# Patient Record
Sex: FEMALE | Race: WHITE | NOT HISPANIC OR LATINO | Employment: UNEMPLOYED | URBAN - METROPOLITAN AREA
[De-identification: names, ages, dates, MRNs, and addresses within clinical notes are randomized per-mention and may not be internally consistent; named-entity substitution may affect disease eponyms.]

---

## 2017-01-24 ENCOUNTER — TELEPHONE (OUTPATIENT)
Dept: FAMILY MEDICINE | Facility: CLINIC | Age: 57
End: 2017-01-24

## 2017-01-24 DIAGNOSIS — H10.45 CHRONIC ALLERGIC CONJUNCTIVITIS: Primary | ICD-10-CM

## 2017-01-24 RX ORDER — OLOPATADINE HYDROCHLORIDE 1 MG/ML
1 SOLUTION/ DROPS OPHTHALMIC 2 TIMES DAILY PRN
Qty: 5 ML | Refills: 3 | Status: SHIPPED | OUTPATIENT
Start: 2017-01-24 | End: 2018-02-22

## 2017-01-24 NOTE — TELEPHONE ENCOUNTER
Panel Management Review      Patient has the following on her problem list: None      Composite cancer screening  Chart review shows that this patient is due/due soon for the following Pap Smear, Colonoscopy and Mammogram   Summary:    Patient is due/failing the following:   COLONOSCOPY, MAMMOGRAM, PAP and PHYSICAL    Action needed:   Patient needs office visit for a pap and physical. She also needs orders for a colonoscopy and mammogram.     Type of outreach:    Phone, left message for patient to call back.  and Sent letter.    Questions for provider review:    None                                                                                                                                    Willian Ellison CMA       Chart routed to self.

## 2017-01-30 DIAGNOSIS — G40.409 OTHER GENERALIZED EPILEPSY, NOT INTRACTABLE, WITHOUT STATUS EPILEPTICUS (H): Primary | ICD-10-CM

## 2017-01-30 NOTE — TELEPHONE ENCOUNTER
carBAMazepine (CARBATROL) 200 MG 12 hr capsule    Date Last Filled: 11/04/2016  QTY: 450    [facility]

## 2017-02-02 RX ORDER — CARBAMAZEPINE 200 MG/1
CAPSULE, EXTENDED RELEASE ORAL
Qty: 450 CAPSULE | Refills: 2 | Status: SHIPPED | OUTPATIENT
Start: 2017-02-02 | End: 2017-10-30

## 2017-02-02 NOTE — TELEPHONE ENCOUNTER
Pt has follow up appt 3/2017.  LOV 9/2016.  Please refill until pt can be seen in clinic.   Berkley COREA RN BSN PHN  [facility]

## 2017-02-27 DIAGNOSIS — G40.409 OTHER GENERALIZED EPILEPSY, NOT INTRACTABLE, WITHOUT STATUS EPILEPTICUS (H): ICD-10-CM

## 2017-03-01 RX ORDER — LAMOTRIGINE 100 MG/1
200 TABLET ORAL 2 TIMES DAILY
Qty: 360 TABLET | Refills: 3 | OUTPATIENT
Start: 2017-03-01

## 2017-03-01 NOTE — TELEPHONE ENCOUNTER
Routing refill request to provider for review/approval because:  Drug not on the [facility] refill protocol     Adeola Zamora RN

## 2017-03-06 ENCOUNTER — OFFICE VISIT (OUTPATIENT)
Dept: NEUROLOGY | Facility: CLINIC | Age: 57
End: 2017-03-06
Payer: COMMERCIAL

## 2017-03-06 VITALS
SYSTOLIC BLOOD PRESSURE: 89 MMHG | HEART RATE: 79 BPM | BODY MASS INDEX: 23.13 KG/M2 | WEIGHT: 146.6 LBS | TEMPERATURE: 98.5 F | DIASTOLIC BLOOD PRESSURE: 62 MMHG

## 2017-03-06 DIAGNOSIS — G40.409 OTHER GENERALIZED EPILEPSY, NOT INTRACTABLE, WITHOUT STATUS EPILEPTICUS (H): ICD-10-CM

## 2017-03-06 DIAGNOSIS — G43.009 MIGRAINE WITHOUT AURA AND WITHOUT STATUS MIGRAINOSUS, NOT INTRACTABLE: ICD-10-CM

## 2017-03-06 DIAGNOSIS — Z79.899 ENCOUNTER FOR LONG-TERM (CURRENT) USE OF MEDICATIONS: Primary | ICD-10-CM

## 2017-03-06 LAB
ALBUMIN SERPL-MCNC: 4.1 G/DL (ref 3.4–5)
ALP SERPL-CCNC: 71 U/L (ref 40–150)
ALT SERPL W P-5'-P-CCNC: 25 U/L (ref 0–50)
ANION GAP SERPL CALCULATED.3IONS-SCNC: 7 MMOL/L (ref 3–14)
AST SERPL W P-5'-P-CCNC: 13 U/L (ref 0–45)
BILIRUB SERPL-MCNC: 0.3 MG/DL (ref 0.2–1.3)
BUN SERPL-MCNC: 25 MG/DL (ref 7–30)
CALCIUM SERPL-MCNC: 8.7 MG/DL (ref 8.5–10.1)
CHLORIDE SERPL-SCNC: 103 MMOL/L (ref 94–109)
CO2 SERPL-SCNC: 28 MMOL/L (ref 20–32)
CREAT SERPL-MCNC: 1.3 MG/DL (ref 0.52–1.04)
ERYTHROCYTE [DISTWIDTH] IN BLOOD BY AUTOMATED COUNT: 11.2 % (ref 10–15)
GFR SERPL CREATININE-BSD FRML MDRD: 42 ML/MIN/1.7M2
GLUCOSE SERPL-MCNC: 88 MG/DL (ref 70–99)
HCT VFR BLD AUTO: 42.3 % (ref 35–47)
HGB BLD-MCNC: 13.9 G/DL (ref 11.7–15.7)
MCH RBC QN AUTO: 31.5 PG (ref 26.5–33)
MCHC RBC AUTO-ENTMCNC: 32.9 G/DL (ref 31.5–36.5)
MCV RBC AUTO: 96 FL (ref 78–100)
PLATELET # BLD AUTO: 197 10E9/L (ref 150–450)
POTASSIUM SERPL-SCNC: 3.9 MMOL/L (ref 3.4–5.3)
PROT SERPL-MCNC: 7.3 G/DL (ref 6.8–8.8)
RBC # BLD AUTO: 4.41 10E12/L (ref 3.8–5.2)
SODIUM SERPL-SCNC: 138 MMOL/L (ref 133–144)
WBC # BLD AUTO: 2.8 10E9/L (ref 4–11)

## 2017-03-06 PROCEDURE — 80175 DRUG SCREEN QUAN LAMOTRIGINE: CPT | Mod: 90 | Performed by: PSYCHIATRY & NEUROLOGY

## 2017-03-06 PROCEDURE — 80339 ANTIEPILEPTICS NOS 1-3: CPT | Mod: 90 | Performed by: PSYCHIATRY & NEUROLOGY

## 2017-03-06 PROCEDURE — 80053 COMPREHEN METABOLIC PANEL: CPT | Performed by: PSYCHIATRY & NEUROLOGY

## 2017-03-06 PROCEDURE — 99000 SPECIMEN HANDLING OFFICE-LAB: CPT | Performed by: PSYCHIATRY & NEUROLOGY

## 2017-03-06 PROCEDURE — 80177 DRUG SCRN QUAN LEVETIRACETAM: CPT | Mod: 90 | Performed by: PSYCHIATRY & NEUROLOGY

## 2017-03-06 PROCEDURE — 80156 ASSAY CARBAMAZEPINE TOTAL: CPT | Mod: 90 | Performed by: PSYCHIATRY & NEUROLOGY

## 2017-03-06 PROCEDURE — 85027 COMPLETE CBC AUTOMATED: CPT | Performed by: PSYCHIATRY & NEUROLOGY

## 2017-03-06 PROCEDURE — 99215 OFFICE O/P EST HI 40 MIN: CPT | Performed by: PSYCHIATRY & NEUROLOGY

## 2017-03-06 PROCEDURE — 36415 COLL VENOUS BLD VENIPUNCTURE: CPT | Performed by: PSYCHIATRY & NEUROLOGY

## 2017-03-06 RX ORDER — LEVETIRACETAM 1000 MG/1
2000 TABLET ORAL 2 TIMES DAILY
Qty: 180 TABLET | Refills: 0 | Status: CANCELLED | OUTPATIENT
Start: 2017-03-06

## 2017-03-06 RX ORDER — LEVETIRACETAM 1000 MG/1
3000 TABLET ORAL 2 TIMES DAILY
Qty: 540 TABLET | Refills: 3 | Status: SHIPPED | OUTPATIENT
Start: 2017-03-06 | End: 2018-02-09

## 2017-03-06 RX ORDER — BUTALBITAL, ACETAMINOPHEN, CAFFEINE AND CODEINE PHOSPHATE 50; 325; 40; 30 MG/1; MG/1; MG/1; MG/1
1 CAPSULE ORAL EVERY 6 HOURS PRN
Qty: 20 CAPSULE | Refills: 1 | Status: SHIPPED | OUTPATIENT
Start: 2017-03-06 | End: 2017-06-06

## 2017-03-06 NOTE — MR AVS SNAPSHOT
"              After Visit Summary   3/6/2017    Magaly Renee    MRN: [id number]           Patient Information     Date Of Birth          1960        Visit Information        Provider Department      3/6/2017 9:30 AM Shayy Mathias MD [facility]        Today's Diagnoses     Encounter for long-term (current) use of medications    -  1    Other generalized epilepsy, not intractable, without status epilepticus (H)        Migraine without aura and without status migrainosus, not intractable          Care Instructions    Plan:    Continue Lamictal 100mg int he morning and 300mg at night.  Continue Tegretol 400mg in the morning and 600mg at night.   Continue Keppra 3000mg twice daily.  Fioricet as needed for headaches.   Labs today. We will notify you of the results.  Return to clinic in 6 months.         Follow-ups after your visit        Who to contact     If you have questions or need follow up information about today's clinic visit or your schedule please contact [facility] directly at [phone].  Normal or non-critical lab and imaging results will be communicated to you by [employer], letter or phone within 4 business days after the clinic has received the results. If you do not hear from us within 7 days, please contact the clinic through [employer] or phone. If you have a critical or abnormal lab result, we will notify you by phone as soon as possible.  Submit refill requests through [employer] or call your pharmacy and they will forward the refill request to us. Please allow 3 business days for your refill to be completed.          Additional Information About Your Visit        [employer] Information     [employer] lets you send messages to your doctor, view your test results, renew your prescriptions, schedule appointments and more. To sign up, go to [email or web address] . Click on \"Log in\" on the left side of the screen, which will take you to the Welcome page. Then click on \"Sign " "up Now\" on the right side of the page.     You will be asked to enter the access code listed below, as well as some personal information. Please follow the directions to create your username and password.     Your access code is: [email or web address]  Expires: 2017  9:53 AM     Your access code will  in 90 days. If you need help or a new code, please call your [facility] or [phone].        Care EveryWhere ID     This is your Care EveryWhere ID. This could be used by other organizations to access your [place] medical records  [id number]        Your Vitals Were     Pulse Temperature Last Period Breastfeeding? BMI (Body Mass Index)       79 98.5  F (36.9  C) (Oral) 2016 (Approximate) No 23.13 kg/m2        Blood Pressure from Last 3 Encounters:   17 (!) 89/62   16 108/62   16 100/54    Weight from Last 3 Encounters:   17 146 lb 9.6 oz (66.5 kg)   16 145 lb (65.8 kg)   16 136 lb (61.7 kg)              We Performed the Following     Carbamazepine and 10 11 epoxide     CBC with platelets     Comprehensive metabolic panel (BMP + Alb, Alk Phos, ALT, AST, Total. Bili, TP)     Lamotrigine level     Levetiracetam level          Where to get your medicines      These medications were sent to [facility]/pharmacy #[id number] - [place] [address], [facility] [address]     Phone:  [phone]     levETIRAcetam 1000 MG Tabs         Some of these will need a paper prescription and others can be bought over the counter.  Ask your nurse if you have questions.     Bring a paper prescription for each of these medications     butalbital-APAP-caffeine-codeine -40-30 MG per capsule          Primary Care Provider Office Phone #    [facility] [phone]       No address on file        Thank you!     Thank you for choosing [facility]  for your care. Our goal is always to provide you with excellent care. Hearing back from our " patients is one way we can continue to improve our services. Please take a few minutes to complete the written survey that you may receive in the mail after your visit with us. Thank you!             Your Updated Medication List - Protect others around you: Learn how to safely use, store and throw away your medicines at www.disposemymeds.org.          This list is accurate as of: 3/6/17  9:53 AM.  Always use your most recent med list.                   Brand Name Dispense Instructions for use    aspirin 325 MG tablet      Take by mouth daily as needed for moderate pain       butalbital-APAP-caffeine-codeine -66-30 MG per capsule    FIORICET WITH codeine    20 capsule    Take 1 capsule by mouth every 6 hours as needed for headaches (do not use more than 3 times per week) To be used as rescue medication if other meds do not work.       carBAMazepine 200 MG 12 hr capsule    CARBATROL    450 capsule    TAKE 2 CAPS IN AM AND 3 CAPS IN PM.       cetirizine 10 MG tablet    zyrTEC    30 tablet    Take 1 tablet (10 mg) by mouth every evening       fluticasone 50 MCG/ACT spray    FLONASE    1 Package    Spray 2 sprays into both nostrils daily       lamoTRIgine 100 MG tablet    LaMICtal    360 tablet    Take 2 tablets (200 mg) by mouth 2 times daily       levETIRAcetam 1000 MG Tabs     540 tablet    Take 3,000 mg by mouth 2 times daily       olopatadine 0.1 % ophthalmic solution    PATANOL    5 mL    Place 1 drop into both eyes 2 times daily as needed

## 2017-03-06 NOTE — PATIENT INSTRUCTIONS
Plan:    Continue Lamictal 100mg in the morning and 300mg at night.  Continue Tegretol 400mg in the morning and 600mg at night.   Continue Keppra 3000mg twice daily.  Fioricet as needed for headaches.   Labs today. We will notify you of the results.  Return to clinic in 6 months.

## 2017-03-06 NOTE — NURSING NOTE
"Chief Complaint   Patient presents with     RECHECK     Seizures.         Initial BP (!) 89/62 (BP Location: Right arm, Patient Position: Chair, Cuff Size: Adult Regular)  Pulse 79  Temp 98.5  F (36.9  C) (Oral)  Wt 146 lb 9.6 oz (66.5 kg)  LMP 07/06/2016 (Approximate)  Breastfeeding? No  BMI 23.13 kg/m2 Estimated body mass index is 23.13 kg/(m^2) as calculated from the following:    Height as of 11/14/16: 5' 6.75\" (1.695 m).    Weight as of this encounter: 146 lb 9.6 oz (66.5 kg).  Medication Reconciliation: complete    Patient prefers to be contacted: letter  Okay to leave detailed message on voicemail: n/a    Nahomy Ly NR-CMA    "

## 2017-03-06 NOTE — PROGRESS NOTES
ESTABLISHED PATIENT NEUROLOGY NOTE    DATE OF VISIT: 3/6/2017  MRN: [id number]  PATIENT NAME: Magaly Renee  YOB: 1960    Chief Complaint   Patient presents with     RECHECK     Seizures.       SUBJECTIVE:                                                      HISTORY OF PRESENT ILLNESS:  Magaly is here for follow up regarding seizure disorder. The patient is on carbamazepine 400mg/600mg, Keppra 3g BID and lamotrigine 100mg/300mg. I had suggested a decrease in the Keppra dose in the past, given the high levels but the patient denies side effects and wanted to continue the higher dose. She did have a seizure last summer in the setting of using  medications and subtherapeutic carbamazepine. Her seizure types are GTCs and staring spells. She takes Fioricet for rare migraine headaches.     The patient tells me that she has been seizure-free since her last visit. She received my recommendation in the interim about again considering decreasing the Keppra, but she continues the 3gm BID dosing due to fear that her seizures will return on a lower dose. She denies fatigue or irritability on the medication. She continues to spread out the doses of Tegretol and Lamictal in such a way that helps her avoid double vision during the day. She feels that she is doing well over all with the current regimen. She denies dizziness/lightheadadness. Headaches are rare (1-2 per month at most). She continues to find relief from these with occasional Fioricet use. She has expressed in the past that no other medications relieve her headaches and I have not had concern about overuse of the Fioricet since I have been seeing the patient. She is currently suffering from seasonal allergy symptoms but otherwise no new concerns.     CURRENT MEDICATIONS:     Current Outpatient Prescriptions on File Prior to Visit:  carBAMazepine (CARBATROL) 200 MG 12 hr capsule TAKE 2 CAPS IN AM AND 3 CAPS IN PM.   olopatadine (PATANOL) 0.1 %  ophthalmic solution Place 1 drop into both eyes 2 times daily as needed   aspirin 325 MG tablet Take by mouth daily as needed for moderate pain   lamoTRIgine (LAMICTAL) 100 MG tablet Take 2 tablets (200 mg) by mouth 2 times daily   [DISCONTINUED] levETIRAcetam 1000 MG TABS Take 2,000 mg by mouth 2 times daily (Patient taking differently: Take 3,000 mg by mouth 2 times daily )   cetirizine (ZYRTEC) 10 MG tablet Take 1 tablet (10 mg) by mouth every evening (Patient not taking: Reported on 3/6/2017)   fluticasone (FLONASE) 50 MCG/ACT nasal spray Spray 2 sprays into both nostrils daily (Patient not taking: Reported on 3/6/2017)     No current facility-administered medications on file prior to visit.       REVIEW OF SYSTEMS:                                                      10-point review of systems is negative except as mentioned above in HPI.     EXAM:                                                      Physical Exam:   Vitals: BP (!) 89/62 (BP Location: Right arm, Patient Position: Chair, Cuff Size: Adult Regular)  Pulse 79  Temp 98.5  F (36.9  C) (Oral)  Wt 146 lb 9.6 oz (66.5 kg)  LMP 07/06/2016 (Approximate)  Breastfeeding? No  BMI 23.13 kg/m2  BMI= Body mass index is 23.13 kg/(m^2).  GENERAL: NAD.   HEENT: Scleral erythema.  Neurologic:  MENTAL STATUS: Alert, attentive. Speech is fluent. Normal comprehension. Normal concentration. Adequate fund of knowledge.   CRANIAL NERVES: Visual fields intact to confrontation. Pupils (Right 1 mm larger than Left), round and reactive to light. Facial sensation and movement normal. EOM full. Hearing intact to conversation. Trapezius strength intact. Palate elevates symmetrically. Tongue midline.  MOTOR: 5/5 in proximal and distal muscle groups of upper and lower extremities. Tone and bulk normal.   DTRs: Intact and symmetric.  SENSATION: Normal light touch throughout.   COORDINATION: Normal fine motor movements. Finger tapping normal.   STATION AND GAIT: Romberg negative.  Tandem normal.  CV: RRR. S1, S2.   NECK: No bruits.      ASSESSMENT and PLAN:                                                      Assessment and Plan:    ICD-10-CM    1. Encounter for long-term (current) use of medications Z79.899 Levetiracetam level     Lamotrigine level     Carbamazepine and 10 11 epoxide     Comprehensive metabolic panel (BMP + Alb, Alk Phos, ALT, AST, Total. Bili, TP)     CBC with platelets   2. Other generalized epilepsy, not intractable, without status epilepticus (H) G40.409 levETIRAcetam 1000 MG TABS     Levetiracetam level     Lamotrigine level     Carbamazepine and 10 11 epoxide     Comprehensive metabolic panel (BMP + Alb, Alk Phos, ALT, AST, Total. Bili, TP)     CBC with platelets   3. Migraine without aura and without status migrainosus, not intractable G43.009 butalbital-APAP-caffeine-codeine (FIORICET WITH CODEINE) -80-30 MG per capsule        Ms. Renee is a pleasant 57 yo woman with history of epilepsy here for 6-month follow-up. We discussed the current medications and my concerns about the Keppra dosing. The patient continues to deny side effects from the medication and wishes to continue the high dose to avoid seizures. She is doing well otherwise. Clinically stable. Headaches continue to be rare and well-controlled with Fioricet. We will check AED levels and plan to follow-up in 6 months and if things continue to go well extend her visits out to 1 year intervals. The patient understands and agrees with the plan.     Noted low BP today. Patient is asymptomatic.    Patient Instructions:  Continue Lamictal 100mg in the morning and 300mg at night.  Continue Tegretol 400mg in the morning and 600mg at night.   Continue Keppra 3000mg twice daily.  Fioricet as needed for headaches.   Labs today. We will notify you of the results.  Return to clinic in 6 months.     Total Time: 40 minutes were spent with the patient. More than 50% of the time spent on counseling (as described  above in Assessment and Plan) /coordinating the care.    Shayy Mathias MD  Neurology

## 2017-03-07 LAB
CARBAMAZEPINE EP SERPL-MCNC: 1.3 UG/ML
CARBAMAZEPINE SERPL-MCNC: 5.2 UG/ML
LAMOTRIGINE SERPL-MCNC: 4 UG/ML
LEVETIRACETAM SERPL-MCNC: 95.2 UG/ML

## 2017-03-08 NOTE — PROGRESS NOTES
Please advise Magaly RILEY Renee,  1960, that her lab results again indicate a high Keppra level, the other medication levels are in therapeutic range. I note quite a decrease in her kidney function. Though the meds may be affecting this, I am concerned that there may be another cause for the sudden change. I would like her to see her primary care provider regarding this. In the meantime, I maintain that I would prefer the patient decrease her Keppra dose, so if the patient has changed her mind on this, I recommend we at least go down to 2gm BID.   [phone] (home)     Thank you  Shayy Mathias

## 2017-03-09 ENCOUNTER — OFFICE VISIT (OUTPATIENT)
Dept: FAMILY MEDICINE | Facility: CLINIC | Age: 57
End: 2017-03-09
Payer: COMMERCIAL

## 2017-03-09 VITALS
HEART RATE: 68 BPM | HEIGHT: 67 IN | BODY MASS INDEX: 23.86 KG/M2 | TEMPERATURE: 98.1 F | WEIGHT: 152 LBS | DIASTOLIC BLOOD PRESSURE: 66 MMHG | SYSTOLIC BLOOD PRESSURE: 118 MMHG

## 2017-03-09 DIAGNOSIS — R79.89 ELEVATED SERUM CREATININE: Primary | ICD-10-CM

## 2017-03-09 LAB
ALBUMIN UR-MCNC: NEGATIVE MG/DL
ANION GAP SERPL CALCULATED.3IONS-SCNC: 6 MMOL/L (ref 3–14)
APPEARANCE UR: CLEAR
BILIRUB UR QL STRIP: NEGATIVE
BUN SERPL-MCNC: 25 MG/DL (ref 7–30)
CALCIUM SERPL-MCNC: 8.8 MG/DL (ref 8.5–10.1)
CHLORIDE SERPL-SCNC: 103 MMOL/L (ref 94–109)
CO2 SERPL-SCNC: 29 MMOL/L (ref 20–32)
COLOR UR AUTO: YELLOW
CREAT SERPL-MCNC: 0.9 MG/DL (ref 0.52–1.04)
CREAT UR-MCNC: 72 MG/DL
GFR SERPL CREATININE-BSD FRML MDRD: 65 ML/MIN/1.7M2
GLUCOSE SERPL-MCNC: 87 MG/DL (ref 70–99)
GLUCOSE UR STRIP-MCNC: NEGATIVE MG/DL
HGB UR QL STRIP: NEGATIVE
KETONES UR STRIP-MCNC: ABNORMAL MG/DL
LEUKOCYTE ESTERASE UR QL STRIP: NEGATIVE
MICROALBUMIN UR-MCNC: 8 MG/L
MICROALBUMIN/CREAT UR: 11.52 MG/G CR (ref 0–25)
NITRATE UR QL: NEGATIVE
PH UR STRIP: 5.5 PH (ref 5–7)
POTASSIUM SERPL-SCNC: 3.9 MMOL/L (ref 3.4–5.3)
RBC #/AREA URNS AUTO: ABNORMAL /HPF (ref 0–2)
SODIUM SERPL-SCNC: 138 MMOL/L (ref 133–144)
SP GR UR STRIP: 1.02 (ref 1–1.03)
URN SPEC COLLECT METH UR: ABNORMAL
UROBILINOGEN UR STRIP-ACNC: 0.2 EU/DL (ref 0.2–1)
WBC #/AREA URNS AUTO: ABNORMAL /HPF (ref 0–2)

## 2017-03-09 PROCEDURE — 36415 COLL VENOUS BLD VENIPUNCTURE: CPT | Performed by: PHYSICIAN ASSISTANT

## 2017-03-09 PROCEDURE — 80048 BASIC METABOLIC PNL TOTAL CA: CPT | Performed by: PHYSICIAN ASSISTANT

## 2017-03-09 PROCEDURE — 99213 OFFICE O/P EST LOW 20 MIN: CPT | Performed by: PHYSICIAN ASSISTANT

## 2017-03-09 PROCEDURE — 81001 URINALYSIS AUTO W/SCOPE: CPT | Performed by: PHYSICIAN ASSISTANT

## 2017-03-09 PROCEDURE — 82043 UR ALBUMIN QUANTITATIVE: CPT | Performed by: PHYSICIAN ASSISTANT

## 2017-03-09 NOTE — PROGRESS NOTES
SUBJECTIVE:                                                    Magaly Renee is a 56 year old female who presents to clinic today for the following health issues:      Patient is here today to go over labs that she had done on 3/6/17. She was told to follow up with you, they called her and said it looked like her kidney function was low. She feels fine.    H/o epilepsy  Has been incident free for several months  Followed up with neurology last on 3/6/17  Keppra level noted to be high but patient did not want to adjust as she has been feeling well with no episodes  Noted to have worsening of her creatinine which was concernign so was advised to follow up with PCP    She has been feeling well  Denies any urinary symptoms  Again emphasizes that she has no intention of lowering her keppra dose    Patient admits she drinks almost no water  She does drink a lot of beverages with caffeine    Problem list and histories reviewed & adjusted, as indicated.  Additional history: as documented    Current Outpatient Prescriptions   Medication Sig Dispense Refill     levETIRAcetam 1000 MG TABS Take 3,000 mg by mouth 2 times daily 540 tablet 3     butalbital-APAP-caffeine-codeine (FIORICET WITH CODEINE) -72-30 MG per capsule Take 1 capsule by mouth every 6 hours as needed for headaches (do not use more than 3 times per week) To be used as rescue medication if other meds do not work. 20 capsule 1     carBAMazepine (CARBATROL) 200 MG 12 hr capsule TAKE 2 CAPS IN AM AND 3 CAPS IN PM. 450 capsule 2     olopatadine (PATANOL) 0.1 % ophthalmic solution Place 1 drop into both eyes 2 times daily as needed 5 mL 3     aspirin 325 MG tablet Take by mouth daily as needed for moderate pain       cetirizine (ZYRTEC) 10 MG tablet Take 1 tablet (10 mg) by mouth every evening 30 tablet 2     lamoTRIgine (LAMICTAL) 100 MG tablet Take 2 tablets (200 mg) by mouth 2 times daily 360 tablet 3     fluticasone (FLONASE) 50 MCG/ACT nasal spray  "Spray 2 sprays into both nostrils daily 1 Package 1     No Known Allergies    Reviewed and updated as needed this visit by clinical staff       Reviewed and updated as needed this visit by Provider         ROS:  Remainder of ROS obtained and found to be negative other than that which was documented above      OBJECTIVE:                                                    /66 (BP Location: Right arm, Patient Position: Chair, Cuff Size: Adult Regular)  Pulse 68  Temp 98.1  F (36.7  C) (Tympanic)  Ht 5' 6.75\" (1.695 m)  Wt 152 lb (68.9 kg)  LMP 07/06/2016 (Approximate)  BMI 23.99 kg/m2  Body mass index is 23.99 kg/(m^2).  GENERAL: healthy, alert and no distress  EYES: Eyes grossly normal to inspection  RESP: lungs clear to auscultation - no rales, rhonchi or wheezes  CV: regular rates and rhythm, normal S1 S2, no S3 or S4 and no murmur, click or rub    Diagnostic Test Results:  Labs pending     ASSESSMENT/PLAN:                                                      ASSESSMENT/PLAN:      ICD-10-CM    1. Elevated serum creatinine R79.89 Basic metabolic panel  (Ca, Cl, CO2, Creat, Gluc, K, Na, BUN)     UA with Microscopic reflex to Culture     Albumin Random Urine Quantitative       Patient Instructions   Your creatine had increased from 0.72 on 3/6/16 to 1.30 on 3/6/17    Next steps:   1. I need you to work on drinking more water and less caffeinated beverages  - caffeine is a diuretic so you actually lose more water. Cut back on the drinks with caffeine and instead replace with water    2. We will repeat the creatinine today to see if it is the same, lower or if it's gone up    3. If it is elevated, we will obtain an US (picture of the kidneys) next. We may also need to renally adjust some of your medications so we don't build up toxic or dangerous levels in your body                Madelyn Curry PA-C  [facility]    "

## 2017-03-09 NOTE — MR AVS SNAPSHOT
After Visit Summary   3/9/2017    Magaly Renee    MRN: [id number]           Patient Information     Date Of Birth          1960        Visit Information        Provider Department      3/9/2017 2:40 PM Madelyn Curry PA-C [facility]        Today's Diagnoses     Elevated serum creatinine    -  1      Care Instructions    Your creatine had increased from 0.72 on 3/6/16 to 1.30 on 3/6/17    Next steps:   1. I need you to work on drinking more water and less caffeinated beverages  - caffeine is a diuretic so you actually lose more water. Cut back on the drinks with caffeine and instead replace with water    2. We will repeat the creatinine today to see if it is the same, lower or if it's gone up    3. If it is elevated, we will obtain an US (picture of the kidneys) next. We may also need to renally adjust some of your medications so we don't build up toxic or dangerous levels in your body        Follow-ups after your visit        Your next 10 appointments already scheduled     Sep 11, 2017  9:30 AM CDT   Return Visit with Shayy Mathias MD   [facility]    [address] [facility] [address]   [phone]              Who to contact     Normal or non-critical lab and imaging results will be communicated to you by MyChart, letter or phone within 4 business days after the clinic has received the results. If you do not hear from us within 7 days, please contact the clinic through MyChart or phone. If you have a critical or abnormal lab result, we will notify you by phone as soon as possible.  Submit refill requests through [employer] or call your pharmacy and they will forward the refill request to us. Please allow 3 business days for your refill to be completed.          If you need to speak with a  for additional information , please call: [phone]             Additional Information About Your Visit    "     MyChart Information     [employer] lets you send messages to your doctor, view your test results, renew your prescriptions, schedule appointments and more. To sign up, go to [email or web address] . Click on \"Log in\" on the left side of the screen, which will take you to the Welcome page. Then click on \"Sign up Now\" on the right side of the page.     You will be asked to enter the access code listed below, as well as some personal information. Please follow the directions to create your username and password.     Your access code is: [email or web address]  Expires: 2017  9:53 AM     Your access code will  in 90 days. If you need help or a new code, please call your [place] clinic or [phone].        Care EveryWhere ID     This is your [facility] EveryWhere ID. This could be used by other organizations to access your [place] medical records  [id number]        Your Vitals Were     Pulse Temperature Height Last Period BMI (Body Mass Index)       68 98.1  F (36.7  C) (Tympanic) 5' 6.75\" (1.695 m) 2016 (Approximate) 23.99 kg/m2        Blood Pressure from Last 3 Encounters:   17 118/66   17 (!) 89/62   16 108/62    Weight from Last 3 Encounters:   17 152 lb (68.9 kg)   17 146 lb 9.6 oz (66.5 kg)   16 145 lb (65.8 kg)              We Performed the Following     Albumin Random Urine Quantitative     Basic metabolic panel  (Ca, Cl, CO2, Creat, Gluc, K, Na, BUN)     UA with Microscopic reflex to Culture        Primary Care Provider Office Phone #    [facility] [phone]       No address on file        Thank you!     Thank you for choosing [facility]  for your care. Our goal is always to provide you with excellent care. Hearing back from our patients is one way we can continue to improve our services. Please take a few minutes to complete the written survey that you may receive in the mail after your visit with us. Thank you!             Your Updated " Medication List - Protect others around you: Learn how to safely use, store and throw away your medicines at www.disposemymeds.org.          This list is accurate as of: 3/9/17  3:15 PM.  Always use your most recent med list.                   Brand Name Dispense Instructions for use    aspirin 325 MG tablet      Take by mouth daily as needed for moderate pain       butalbital-APAP-caffeine-codeine -97-30 MG per capsule    FIORICET WITH codeine    20 capsule    Take 1 capsule by mouth every 6 hours as needed for headaches (do not use more than 3 times per week) To be used as rescue medication if other meds do not work.       carBAMazepine 200 MG 12 hr capsule    CARBATROL    450 capsule    TAKE 2 CAPS IN AM AND 3 CAPS IN PM.       cetirizine 10 MG tablet    zyrTEC    30 tablet    Take 1 tablet (10 mg) by mouth every evening       fluticasone 50 MCG/ACT spray    FLONASE    1 Package    Spray 2 sprays into both nostrils daily       lamoTRIgine 100 MG tablet    LaMICtal    360 tablet    Take 2 tablets (200 mg) by mouth 2 times daily       levETIRAcetam 1000 MG Tabs     540 tablet    Take 3,000 mg by mouth 2 times daily       olopatadine 0.1 % ophthalmic solution    PATANOL    5 mL    Place 1 drop into both eyes 2 times daily as needed

## 2017-03-09 NOTE — PATIENT INSTRUCTIONS
Your creatine had increased from 0.72 on 3/6/16 to 1.30 on 3/6/17    Next steps:   1. I need you to work on drinking more water and less caffeinated beverages  - caffeine is a diuretic so you actually lose more water. Cut back on the drinks with caffeine and instead replace with water    2. We will repeat the creatinine today to see if it is the same, lower or if it's gone up    3. If it is elevated, we will obtain an US (picture of the kidneys) next. We may also need to renally adjust some of your medications so we don't build up toxic or dangerous levels in your body

## 2017-03-09 NOTE — NURSING NOTE
"Chief Complaint   Patient presents with     Results       Initial /66 (BP Location: Right arm, Patient Position: Chair, Cuff Size: Adult Regular)  Pulse 68  Temp 98.1  F (36.7  C) (Tympanic)  Ht 5' 6.75\" (1.695 m)  Wt 152 lb (68.9 kg)  LMP 07/06/2016 (Approximate)  BMI 23.99 kg/m2 Estimated body mass index is 23.99 kg/(m^2) as calculated from the following:    Height as of this encounter: 5' 6.75\" (1.695 m).    Weight as of this encounter: 152 lb (68.9 kg).  Medication Reconciliation: complete     Willian Ellison CMA    "

## 2017-03-10 DIAGNOSIS — R79.89 ELEVATED SERUM CREATININE: Primary | ICD-10-CM

## 2017-03-30 ENCOUNTER — TELEPHONE (OUTPATIENT)
Dept: FAMILY MEDICINE | Facility: CLINIC | Age: 57
End: 2017-03-30

## 2017-03-30 NOTE — LETTER
[facility]  [address] [place]  [address]  Phone: [phone]      March 30, 2017      Magaly Renee  [address] [place] [address]              Dear Ms. Renee,    As part of [place]'s commitment to health and wellness we have recently reviewed your chart and your medical record indicates that you are due for one or more of the following:    -- Pap smear. The last pap that we have on file for you was from 11/02/2012. These are recommended every 3 years. Please call our clinic to schedule your pap smear / physical appointment.     -- Mammogram. The last mammogram that we have on file for you was from 10/02/2014. Please call one of the following numbers to schedule:  [facility] [phone]  [facility] [phone]  [facility] [phone]  [facility] [phone]  [facility] [place] [phone]  [facility] [phone]    -- Colonoscopy or FIT test. We don't have a last colonoscopy/FIT on file for you. Please call one of the following numbers to schedule a colonoscopy:  [facility] [phone]  [facility] [phone]  [facility] [phone]  [facility] [phone] (multiple sites, call for locations)    A colonoscopy is the gold standard but if you are reluctant to do this there is a less invasive and less expensive alternative. FIT (fecal immunochemical testing) is a screening option that is performed on a yearly basis. This is a test to check for blood in the stool, which can be performed in the comfort of your own home. If you are willing to perform this test, we can order the kit for you to  at our lab. When you have completed the test, you simply mail it in the pre-addressed envelope.    Please call us at [phone] if you need an order for a colonoscopy or FIT testing.    Please try to schedule and/or complete the tests above within the next 2-4 weeks.   The number to call to schedule an  appointment at [facility] is [phone].    While we work hard to maintain accurate records on all our patients, it is always possible that this notice does not accurately reflect tests that you may have had. To ensure that we do not continue to send you notices please verify, at your next visit or by a [employer] message, that we have accurate dates of your tests, even if these were done many years ago.     Working together for your health is our goal.    Thank you for choosing [facility] for your healthcare needs.      Sincerely,     Madelyn Curry PA-C/  [employer] [facility]

## 2017-03-30 NOTE — TELEPHONE ENCOUNTER
Panel Management Review      Patient has the following on her problem list: None      Composite cancer screening  Chart review shows that this patient is due/due soon for the following Pap Smear, Mammogram and Colonoscopy  Summary:    Patient is due/failing the following:   COLONOSCOPY, MAMMOGRAM, PAP and PHYSICAL    Action needed:   Patient needs office visit for physical and pap. Patient also needs a referral/order: colonoscopy and mammogram    Type of outreach:    Sent letter.    Questions for provider review:    None                                                                                                                                    Lay Burkett CMA       Chart routed to self   .

## 2017-05-03 ENCOUNTER — TELEPHONE (OUTPATIENT)
Dept: FAMILY MEDICINE | Facility: CLINIC | Age: 57
End: 2017-05-03

## 2017-05-03 NOTE — TELEPHONE ENCOUNTER
"Reason for call:  Patient reporting a symptom    Symptom or request: ringing in ears.  Karen states that she was seen about 2 years ago for ringing in ears and she has the same thing.     Duration (how long have symptoms been present): When asked how long she has had it - she states \" I'm the last one to get the stomach flu in my family with fever\".      Have you been treated for this before? Yes    Additional comments: She states that it's affecting her hearing    Phone Number patient can be reached at:  Home number on file [phone] (home)    Best Time:  anytime    Can we leave a detailed message on this number:  YES    Call taken on 5/3/2017 at 11:53 AM by Lennie Walsh    "

## 2017-05-03 NOTE — TELEPHONE ENCOUNTER
"Two years ago had ringing in her ears and now has had it again. \"The doctor at that time had prescribed me a steroid.\" Has had the symptoms for 3 days.  Denies pain. Appointment made for tomorrow morning.  Raheel Price RN    "

## 2017-05-04 ENCOUNTER — OFFICE VISIT (OUTPATIENT)
Dept: FAMILY MEDICINE | Facility: CLINIC | Age: 57
End: 2017-05-04
Payer: COMMERCIAL

## 2017-05-04 VITALS
BODY MASS INDEX: 23.54 KG/M2 | TEMPERATURE: 98.6 F | WEIGHT: 150 LBS | HEART RATE: 70 BPM | DIASTOLIC BLOOD PRESSURE: 62 MMHG | SYSTOLIC BLOOD PRESSURE: 108 MMHG | HEIGHT: 67 IN

## 2017-05-04 DIAGNOSIS — H93.13 BILATERAL TINNITUS: Primary | ICD-10-CM

## 2017-05-04 PROCEDURE — 99213 OFFICE O/P EST LOW 20 MIN: CPT | Performed by: PHYSICIAN ASSISTANT

## 2017-05-04 RX ORDER — PREDNISONE 20 MG/1
TABLET ORAL
Qty: 20 TABLET | Refills: 0 | Status: SHIPPED | OUTPATIENT
Start: 2017-05-04 | End: 2017-09-18

## 2017-05-04 NOTE — MR AVS SNAPSHOT
After Visit Summary   5/4/2017    Magaly Renee    MRN: [id number]           Patient Information     Date Of Birth          1960        Visit Information        Provider Department      5/4/2017 10:00 AM Madelyn Curry PA-C [facility]        Today's Diagnoses     Bilateral tinnitus    -  1       Follow-ups after your visit        Additional Services     AUDIOLOGY ADULT REFERRAL       Your provider has referred you to: [facility] [phone]   [email or web address]    Specialty Testing:  Audiogram w/Tymps and Reflexes (Comprehensive Audiology Evaluation)            OTOLARYNGOLOGY REFERRAL       Your provider has referred you to: [facility]: [facility] [phone]   [email or web address]    Please be aware that coverage of these services is subject to the terms and limitations of your health insurance plan.  Call member services at your health plan with any benefit or coverage questions.      Please bring the following with you to your appointment:    (1) Any X-Rays, CTs or MRIs which have been performed.  Contact the facility where they were done to arrange for  prior to your scheduled appointment.   (2) List of current medications  (3) This referral request   (4) Any documents/labs given to you for this referral                  Your next 10 appointments already scheduled     Sep 11, 2017  9:30 AM CDT   Return Visit with Shayy Mathias MD   [facility] ([facility]    [address] [address] [phone]              Who to contact     Normal or non-critical lab and imaging results will be communicated to you by MyChart, letter or phone within 4 business days after the clinic has received the results. If you do not hear from us within 7 days, please contact the clinic through MyChart or phone. If you have a  "critical or abnormal lab result, we will notify you by phone as soon as possible.  Submit refill requests through [employer] or call your pharmacy and they will forward the refill request to us. Please allow 3 business days for your refill to be completed.          If you need to speak with a  for additional information , please call: [phone]             Additional Information About Your Visit        [employer] Information     [employer] lets you send messages to your doctor, view your test results, renew your prescriptions, schedule appointments and more. To sign up, go to [email or web address] . Click on \"Log in\" on the left side of the screen, which will take you to the Welcome page. Then click on \"Sign up Now\" on the right side of the page.     You will be asked to enter the access code listed below, as well as some personal information. Please follow the directions to create your username and password.     Your access code is: [email or web address]  Expires: 2017 10:53 AM     Your access code will  in 90 days. If you need help or a new code, please call your [place] clinic or [phone].        Care EveryWhere ID     This is your Care EveryWhere ID. This could be used by other organizations to access your [place] medical records  [id number]        Your Vitals Were     Pulse Temperature Height Last Period BMI (Body Mass Index)       70 98.6  F (37  C) (Tympanic) 5' 6.75\" (1.695 m) 2016 (Approximate) 23.67 kg/m2        Blood Pressure from Last 3 Encounters:   17 108/62   17 118/66   17 (!) 89/62    Weight from Last 3 Encounters:   17 150 lb (68 kg)   17 152 lb (68.9 kg)   17 146 lb 9.6 oz (66.5 kg)              We Performed the Following     AUDIOLOGY ADULT REFERRAL     OTOLARYNGOLOGY REFERRAL          Today's Medication Changes          These changes are accurate as of: 17 10:20 AM.  If you have any questions, ask your nurse or doctor.          "      Start taking these medicines.        Dose/Directions    predniSONE 20 MG tablet   Commonly known as:  DELTASONE   Used for:  Bilateral tinnitus   Started by:  Madelyn Curry PA-C        Take 3 tabs (60 mg) by mouth daily x 3 days, 2 tabs (40 mg) daily x 3 days, 1 tab (20 mg) daily x 3 days, then 1/2 tab (10 mg) x 3 days.   Quantity:  20 tablet   Refills:  0            Where to get your medicines      These medications were sent to [facility] [place], [place] - [address] [address]  [address] [address] [address]     Phone:  [phone]     predniSONE 20 MG tablet                Primary Care Provider Office Phone #    [facility] [phone]       No address on file        Thank you!     Thank you for choosing [facility]3]  for your care. Our goal is always to provide you with excellent care. Hearing back from our patients is one way we can continue to improve our services. Please take a few minutes to complete the written survey that you may receive in the mail after your visit with us. Thank you!             Your Updated Medication List - Protect others around you: Learn how to safely use, store and throw away your medicines at www.disposemymeds.org.          This list is accurate as of: 5/4/17 10:20 AM.  Always use your most recent med list.                   Brand Name Dispense Instructions for use    aspirin 325 MG tablet      Take by mouth daily as needed for moderate pain       butalbital-APAP-caffeine-codeine -84-30 MG per capsule    FIORICET WITH codeine    20 capsule    Take 1 capsule by mouth every 6 hours as needed for headaches (do not use more than 3 times per week) To be used as rescue medication if other meds do not work.       carBAMazepine 200 MG 12 hr capsule    CARBATROL    450 capsule    TAKE 2 CAPS IN AM AND 3 CAPS IN PM.       cetirizine 10 MG tablet    zyrTEC    30 tablet    Take 1 tablet (10 mg) by mouth every evening        fluticasone 50 MCG/ACT spray    FLONASE    1 Package    Spray 2 sprays into both nostrils daily       lamoTRIgine 100 MG tablet    LaMICtal    360 tablet    Take 2 tablets (200 mg) by mouth 2 times daily       levETIRAcetam 1000 MG Tabs     540 tablet    Take 3,000 mg by mouth 2 times daily       olopatadine 0.1 % ophthalmic solution    PATANOL    5 mL    Place 1 drop into both eyes 2 times daily as needed       predniSONE 20 MG tablet    DELTASONE    20 tablet    Take 3 tabs (60 mg) by mouth daily x 3 days, 2 tabs (40 mg) daily x 3 days, 1 tab (20 mg) daily x 3 days, then 1/2 tab (10 mg) x 3 days.

## 2017-05-04 NOTE — PROGRESS NOTES
"  SUBJECTIVE:                                                    Magaly Renee is a 56 year old female who presents to clinic today for the following health issues:      ENT Symptoms             Symptoms: cc Present Absent Comment   Fever/Chills  x  Low grade    Fatigue  x     Muscle Aches   x    Eye Irritation   x    Sneezing   x    Nasal Dontae/Drg   x    Sinus Pressure/Pain   x    Loss of smell   x    Dental pain   x    Sore Throat   x    Swollen Glands   x    Ear Pain/Fullness  x  Ringing in both of her ears, has had this before. Last time this happened she got steroids for it and it helped right away.    Cough   x    Wheeze   x    Chest Pain   x    Shortness of breath   x    Rash   x    Other   x Had the stomach flu a few days ago      Symptom duration:  4-5 days    Symptom severity:  moderate    Treatments tried:  None   Contacts:  None     Feels like she has ringing or very loud \"background noise\" in her ears affecting her hearing loss  Kids noticed it because the TV's were turned up really loud  Started with flu-like symptoms a few days ago which have improved     HAd a similar episode a couple years ago and was given prednisone - within one dose symptoms had improved  In review of her chart - she had presented with same symptoms but was noted to have hearing loss  ENT was consulted and she was advised to go to the [facility] for evaluation given the acute onset sudden hearing loss  Patient became very upset and refused to go down there   Ultimately patient was given prednisone and encouraged to see ENT  She initially said she was not interested in the prednisone but  Ultimately ended up taking it with improvement  She has had no issues since until today  Feels like until the last few days hearing was fine      Problem list and histories reviewed & adjusted, as indicated.  Additional history: as documented    Current Outpatient Prescriptions   Medication Sig Dispense Refill     levETIRAcetam 1000 MG TABS Take " "3,000 mg by mouth 2 times daily 540 tablet 3     butalbital-APAP-caffeine-codeine (FIORICET WITH CODEINE) -66-30 MG per capsule Take 1 capsule by mouth every 6 hours as needed for headaches (do not use more than 3 times per week) To be used as rescue medication if other meds do not work. 20 capsule 1     carBAMazepine (CARBATROL) 200 MG 12 hr capsule TAKE 2 CAPS IN AM AND 3 CAPS IN PM. 450 capsule 2     olopatadine (PATANOL) 0.1 % ophthalmic solution Place 1 drop into both eyes 2 times daily as needed 5 mL 3     aspirin 325 MG tablet Take by mouth daily as needed for moderate pain       cetirizine (ZYRTEC) 10 MG tablet Take 1 tablet (10 mg) by mouth every evening 30 tablet 2     lamoTRIgine (LAMICTAL) 100 MG tablet Take 2 tablets (200 mg) by mouth 2 times daily 360 tablet 3     fluticasone (FLONASE) 50 MCG/ACT nasal spray Spray 2 sprays into both nostrils daily 1 Package 1     No Known Allergies    Reviewed and updated as needed this visit by clinical staff       Reviewed and updated as needed this visit by Provider         ROS:  Remainder of ROS obtained and found to be negative other than that which was documented above      OBJECTIVE:                                                    /62 (BP Location: Right arm, Patient Position: Chair, Cuff Size: Adult Regular)  Pulse 70  Temp 98.6  F (37  C) (Tympanic)  Ht 5' 6.75\" (1.695 m)  Wt 150 lb (68 kg)  LMP 07/06/2016 (Approximate)  BMI 23.67 kg/m2  Body mass index is 23.67 kg/(m^2).  GENERAL: healthy, alert and no distress  EYES: Eyes grossly normal to inspection  NECK: no adenopathy, no asymmetry, masses, or scars and thyroid normal to palpation  RESP: lungs clear to auscultation - no rales, rhonchi or wheezes  CV: regular rate and rhythm, normal S1 S2, no S3 or S4, no murmur, click or rub, no peripheral edema and peripheral pulses strong    Diagnostic Test Results:  none      ASSESSMENT/PLAN:                                                  "   (H93.13) Bilateral tinnitus  (primary encounter diagnosis)  Comment: h/o tinnitus with changes in hearing - responded in past to prednisone. She denies any dizziness or vertigo. Willing to try prednisone again but emphasized following up with ENT and an audiogram. Patient seems reluctant to do so but did say she would call if symptoms did not improve similar to last time  Plan: AUDIOLOGY ADULT REFERRAL, OTOLARYNGOLOGY         REFERRAL, predniSONE (DELTASONE) 20 MG tablet                Madelyn Curry PA-C  [facility]

## 2017-05-04 NOTE — NURSING NOTE
"Chief Complaint   Patient presents with     Tinnitus       Initial /62 (BP Location: Right arm, Patient Position: Chair, Cuff Size: Adult Regular)  Pulse 70  Temp 98.6  F (37  C) (Tympanic)  Ht 5' 6.75\" (1.695 m)  Wt 150 lb (68 kg)  LMP 07/06/2016 (Approximate)  BMI 23.67 kg/m2 Estimated body mass index is 23.67 kg/(m^2) as calculated from the following:    Height as of this encounter: 5' 6.75\" (1.695 m).    Weight as of this encounter: 150 lb (68 kg).  Medication Reconciliation: complete     Willian Ellison CMA    "

## 2017-05-05 ENCOUNTER — TELEPHONE (OUTPATIENT)
Dept: FAMILY MEDICINE | Facility: CLINIC | Age: 57
End: 2017-05-05

## 2017-05-05 NOTE — TELEPHONE ENCOUNTER
Reason for Call:  Other returning call    Detailed comments: FYI:  Karen states that she feels 100% better.  Back to herself again.  She says thank you and after this all clears she will make an appointment with ENT to have her ears checked to make sure there is nothing going on with her hearing.  Thank you..Lennie Walsh      Phone Number Patient can be reached at: Home number on file [phone] (home) (if needed)    Best Time: anytime    Can we leave a detailed message on this number? did not state    Call taken on 5/5/2017 at 8:34 AM by Lennie Walsh

## 2017-05-10 DIAGNOSIS — G40.409 OTHER GENERALIZED EPILEPSY, NOT INTRACTABLE, WITHOUT STATUS EPILEPTICUS (H): ICD-10-CM

## 2017-05-10 RX ORDER — LAMOTRIGINE 100 MG/1
200 TABLET ORAL 2 TIMES DAILY
Qty: 360 TABLET | Refills: 3 | Status: SHIPPED | OUTPATIENT
Start: 2017-05-10 | End: 2018-03-29

## 2017-06-06 DIAGNOSIS — G43.009 MIGRAINE WITHOUT AURA AND WITHOUT STATUS MIGRAINOSUS, NOT INTRACTABLE: ICD-10-CM

## 2017-06-07 RX ORDER — BUTALBITAL, ACETAMINOPHEN, CAFFEINE AND CODEINE PHOSPHATE 50; 325; 40; 30 MG/1; MG/1; MG/1; MG/1
1 CAPSULE ORAL EVERY 6 HOURS PRN
Qty: 20 CAPSULE | Refills: 1 | Status: SHIPPED | OUTPATIENT
Start: 2017-06-07 | End: 2017-08-04

## 2017-08-04 DIAGNOSIS — G43.009 MIGRAINE WITHOUT AURA AND WITHOUT STATUS MIGRAINOSUS, NOT INTRACTABLE: ICD-10-CM

## 2017-08-08 RX ORDER — BUTALBITAL, ACETAMINOPHEN, CAFFEINE AND CODEINE PHOSPHATE 50; 325; 40; 30 MG/1; MG/1; MG/1; MG/1
1 CAPSULE ORAL EVERY 6 HOURS PRN
Qty: 20 CAPSULE | Refills: 0 | Status: SHIPPED | OUTPATIENT
Start: 2017-08-08 | End: 2017-09-18

## 2017-09-18 ENCOUNTER — OFFICE VISIT (OUTPATIENT)
Dept: NEUROLOGY | Facility: CLINIC | Age: 57
End: 2017-09-18
Payer: COMMERCIAL

## 2017-09-18 VITALS
OXYGEN SATURATION: 100 % | DIASTOLIC BLOOD PRESSURE: 71 MMHG | WEIGHT: 151.2 LBS | BODY MASS INDEX: 23.86 KG/M2 | HEART RATE: 84 BPM | SYSTOLIC BLOOD PRESSURE: 107 MMHG

## 2017-09-18 DIAGNOSIS — G40.909 SEIZURE DISORDER (H): Primary | ICD-10-CM

## 2017-09-18 DIAGNOSIS — G43.009 MIGRAINE WITHOUT AURA AND WITHOUT STATUS MIGRAINOSUS, NOT INTRACTABLE: ICD-10-CM

## 2017-09-18 DIAGNOSIS — Z79.899 ENCOUNTER FOR LONG-TERM (CURRENT) USE OF MEDICATIONS: ICD-10-CM

## 2017-09-18 LAB
ALBUMIN SERPL-MCNC: 4.1 G/DL (ref 3.4–5)
ALP SERPL-CCNC: 79 U/L (ref 40–150)
ALT SERPL W P-5'-P-CCNC: 27 U/L (ref 0–50)
ANION GAP SERPL CALCULATED.3IONS-SCNC: 6 MMOL/L (ref 3–14)
AST SERPL W P-5'-P-CCNC: 15 U/L (ref 0–45)
BILIRUB SERPL-MCNC: 0.3 MG/DL (ref 0.2–1.3)
BUN SERPL-MCNC: 24 MG/DL (ref 7–30)
CALCIUM SERPL-MCNC: 8.7 MG/DL (ref 8.5–10.1)
CHLORIDE SERPL-SCNC: 105 MMOL/L (ref 94–109)
CO2 SERPL-SCNC: 27 MMOL/L (ref 20–32)
CREAT SERPL-MCNC: 0.76 MG/DL (ref 0.52–1.04)
ERYTHROCYTE [DISTWIDTH] IN BLOOD BY AUTOMATED COUNT: 11.2 % (ref 10–15)
GFR SERPL CREATININE-BSD FRML MDRD: 78 ML/MIN/1.7M2
GLUCOSE SERPL-MCNC: 98 MG/DL (ref 70–99)
HCT VFR BLD AUTO: 40.6 % (ref 35–47)
HGB BLD-MCNC: 13.4 G/DL (ref 11.7–15.7)
MCH RBC QN AUTO: 32.6 PG (ref 26.5–33)
MCHC RBC AUTO-ENTMCNC: 33 G/DL (ref 31.5–36.5)
MCV RBC AUTO: 99 FL (ref 78–100)
PLATELET # BLD AUTO: 176 10E9/L (ref 150–450)
POTASSIUM SERPL-SCNC: 4.1 MMOL/L (ref 3.4–5.3)
PROT SERPL-MCNC: 7.5 G/DL (ref 6.8–8.8)
RBC # BLD AUTO: 4.11 10E12/L (ref 3.8–5.2)
SODIUM SERPL-SCNC: 138 MMOL/L (ref 133–144)
WBC # BLD AUTO: 3.2 10E9/L (ref 4–11)

## 2017-09-18 PROCEDURE — 80339 ANTIEPILEPTICS NOS 1-3: CPT | Mod: 90 | Performed by: PSYCHIATRY & NEUROLOGY

## 2017-09-18 PROCEDURE — 80175 DRUG SCREEN QUAN LAMOTRIGINE: CPT | Mod: 90 | Performed by: PSYCHIATRY & NEUROLOGY

## 2017-09-18 PROCEDURE — 80177 DRUG SCRN QUAN LEVETIRACETAM: CPT | Mod: 90 | Performed by: PSYCHIATRY & NEUROLOGY

## 2017-09-18 PROCEDURE — 80156 ASSAY CARBAMAZEPINE TOTAL: CPT | Mod: 90 | Performed by: PSYCHIATRY & NEUROLOGY

## 2017-09-18 PROCEDURE — 85027 COMPLETE CBC AUTOMATED: CPT | Performed by: PSYCHIATRY & NEUROLOGY

## 2017-09-18 PROCEDURE — 80053 COMPREHEN METABOLIC PANEL: CPT | Performed by: PSYCHIATRY & NEUROLOGY

## 2017-09-18 PROCEDURE — 36415 COLL VENOUS BLD VENIPUNCTURE: CPT | Performed by: PSYCHIATRY & NEUROLOGY

## 2017-09-18 PROCEDURE — 99000 SPECIMEN HANDLING OFFICE-LAB: CPT | Performed by: PSYCHIATRY & NEUROLOGY

## 2017-09-18 PROCEDURE — 99214 OFFICE O/P EST MOD 30 MIN: CPT | Performed by: PSYCHIATRY & NEUROLOGY

## 2017-09-18 RX ORDER — BUTALBITAL, ACETAMINOPHEN, CAFFEINE AND CODEINE PHOSPHATE 50; 325; 40; 30 MG/1; MG/1; MG/1; MG/1
1 CAPSULE ORAL EVERY 6 HOURS PRN
Qty: 20 CAPSULE | Refills: 0 | Status: SHIPPED | OUTPATIENT
Start: 2017-09-18 | End: 2017-10-20

## 2017-09-18 NOTE — MR AVS SNAPSHOT
"              After Visit Summary   9/18/2017    Magaly Renee    MRN: [id number]           Patient Information     Date Of Birth          1960        Visit Information        Provider Department      9/18/2017 8:45 AM Shayy Mathias MD [facility]        Today's Diagnoses     Seizure disorder (H)    -  1    Encounter for long-term (current) use of medications          Care Instructions    Plan:    Continue Lamictal 100mg in the morning and 300mg at night.  Continue Tegretol 400mg in the morning and 600mg at night.   Continue Keppra 3000mg twice daily.  Fioricet as needed for headaches.   Labs today. We will notify you of the results.  Return to clinic in 6 months.           Follow-ups after your visit        Follow-up notes from your care team     Return in about 6 months (around 3/18/2018).      Who to contact     If you have questions or need follow up information about today's clinic visit or your schedule please contact [facility] directly at [phone].  Normal or non-critical lab and imaging results will be communicated to you by MightyHivehart, letter or phone within 4 business days after the clinic has received the results. If you do not hear from us within 7 days, please contact the clinic through Bandhappyt or phone. If you have a critical or abnormal lab result, we will notify you by phone as soon as possible.  Submit refill requests through Live[employer]ive Systems or call your pharmacy and they will forward the refill request to us. Please allow 3 business days for your refill to be completed.          Additional Information About Your Visit        MightyHiveharBizBrag Information     [employer] Systems lets you send messages to your doctor, view your test results, renew your prescriptions, schedule appointments and more. To sign up, go to [email or web address] . Click on \"Log in\" on the left side of the screen, which will take you to the Welcome page. Then click on \"Sign up Now\" on the right side of the " page.     You will be asked to enter the access code listed below, as well as some personal information. Please follow the directions to create your username and password.     Your access code is: [email or web address]  Expires: 2017  9:06 AM     Your access code will  in 90 days. If you need help or a new code, please call your [place] clinic or [phone].        Care EveryWhere ID     This is your Care EveryWhere ID. This could be used by other organizations to access your [place] medical records  [id number]        Your Vitals Were     Pulse Last Period Pulse Oximetry BMI (Body Mass Index)          84 2016 (Approximate) 100% 23.86 kg/m2         Blood Pressure from Last 3 Encounters:   17 107/71   17 108/62   17 118/66    Weight from Last 3 Encounters:   17 151 lb 3.2 oz (68.6 kg)   17 150 lb (68 kg)   17 152 lb (68.9 kg)              We Performed the Following     Carbamazepine and 10 11 epoxide     CBC with platelets     Comprehensive metabolic panel (BMP + Alb, Alk Phos, ALT, AST, Total. Bili, TP)     Keppra (Levetiracetam) Level     Lamotrigine Level          Today's Medication Changes          These changes are accurate as of: 17  9:06 AM.  If you have any questions, ask your nurse or doctor.               These medicines have changed or have updated prescriptions.        Dose/Directions    cetirizine 10 MG tablet   Commonly known as:  zyrTEC   This may have changed:    - when to take this  - reasons to take this   Used for:  Allergic conjunctivitis, bilateral, Seasonal allergic rhinitis        Dose:  10 mg   Take 1 tablet (10 mg) by mouth every evening   Quantity:  30 tablet   Refills:  2       fluticasone 50 MCG/ACT spray   Commonly known as:  FLONASE   This may have changed:    - when to take this  - reasons to take this   Used for:  ETD (eustachian tube dysfunction)        Dose:  2 spray   Spray 2 sprays into both nostrils daily   Quantity:  1 Package    Refills:  1                Primary Care Provider Office Phone #    [facility] [phone]       No address on file        Equal Access to Services     JOSÉ MIGUEL CASH : Hadii aad ku hadcarlosomari Yue, wasarahda luqdebbie, elainata kaalmada ruby, remberto kayodein hayaaeleuterio monetcelio noble micheal braun. So [facility] [phone].    ATENCIÓN: Si habla español, tiene a mack disposición servicios gratuitos de asistencia lingüística. Acacia al [phone].    We comply with applicable federal civil rights laws and [place] laws. We do not discriminate on the basis of race, color, national origin, age, disability sex, sexual orientation or gender identity.            Thank you!     Thank you for choosing [facility]  for your care. Our goal is always to provide you with excellent care. Hearing back from our patients is one way we can continue to improve our services. Please take a few minutes to complete the written survey that you may receive in the mail after your visit with us. Thank you!             Your Updated Medication List - Protect others around you: Learn how to safely use, store and throw away your medicines at www.disposemymeds.org.          This list is accurate as of: 9/18/17  9:06 AM.  Always use your most recent med list.                   Brand Name Dispense Instructions for use Diagnosis    aspirin 325 MG tablet      Take by mouth daily as needed for moderate pain        butalbital-APAP-caffeine-codeine -14-30 MG per capsule    FIORICET WITH codeine    20 capsule    Take 1 capsule by mouth every 6 hours as needed for headaches (do not use more than 3 times per week) To be used as rescue medication if other meds do not work.    Migraine without aura and without status migrainosus, not intractable       carBAMazepine 200 MG 12 hr capsule    CARBATROL    450 capsule    TAKE 2 CAPS IN AM AND 3 CAPS IN PM.    Other generalized epilepsy, not intractable, without status epilepticus (H)       cetirizine  10 MG tablet    zyrTEC    30 tablet    Take 1 tablet (10 mg) by mouth every evening    Allergic conjunctivitis, bilateral, Seasonal allergic rhinitis       fluticasone 50 MCG/ACT spray    FLONASE    1 Package    Spray 2 sprays into both nostrils daily    ETD (eustachian tube dysfunction)       lamoTRIgine 100 MG tablet    LaMICtal    360 tablet    Take 2 tablets (200 mg) by mouth 2 times daily (or as directed)    Other generalized epilepsy, not intractable, without status epilepticus (H)       levETIRAcetam 1000 MG Tabs     540 tablet    Take 3,000 mg by mouth 2 times daily    Other generalized epilepsy, not intractable, without status epilepticus (H)       olopatadine 0.1 % ophthalmic solution    PATANOL    5 mL    Place 1 drop into both eyes 2 times daily as needed    Chronic allergic conjunctivitis

## 2017-09-18 NOTE — NURSING NOTE
"Chief Complaint   Patient presents with     RECHECK     Seizures       Initial /71 (BP Location: Right arm, Patient Position: Chair, Cuff Size: Adult Regular)  Pulse 84  Wt 151 lb 3.2 oz (68.6 kg)  LMP 07/06/2016 (Approximate)  SpO2 100%  BMI 23.86 kg/m2 Estimated body mass index is 23.86 kg/(m^2) as calculated from the following:    Height as of 5/4/17: 5' 6.75\" (1.695 m).    Weight as of this encounter: 151 lb 3.2 oz (68.6 kg).  Medication Reconciliation: complete    Patient prefers to be contacted: email [email or web address]  Okay to leave detailed message on voicemail: n/a    Nahomy THRASHER-CMA    "

## 2017-09-18 NOTE — PROGRESS NOTES
"ESTABLISHED PATIENT NEUROLOGY NOTE    DATE OF VISIT: 2017  MRN: [id number]  PATIENT NAME: Magaly Renee  YOB: 1960    Chief Complaint   Patient presents with     RECHECK     Seizures     SUBJECTIVE:                                                      HISTORY OF PRESENT ILLNESS:  Magaly is here for follow up regarding seizures. The patient is on carbamazepine (400mg/600mg), Keppra 3gm BID and lamotrigine (100mg/300mg). When I saw her about 6 months ago, we again discussed lowering the Keppra dose, but the patient was not willing to do so for fear of seizure recurrence. She denied side effects despite the high level.     The patient tells me that she continues to be seizure-free.    The patient reminds me that her most recent seizure occurred in the summer of  (this was one of her \"bigger,\" grand mal seizures). She had the breakthrough staring spell some time after this. This was the one that occurred in the setting of  carbamazepine, documented in my previous note. She denies side effects from the medications. These are her two seizure types.    She denies new concerns. She does continue to have clusters of her typical migraine headaches, but not often and manageable (she can go months without headaches). She uses the Fioricet rarely (again, in clusters). I am reminded that previous treatments for headaches were reportedly ineffective. She endorses worsening of her seasonal allergies lately, which is typical for her this time of year.       CURRENT MEDICATIONS:     Current Outpatient Prescriptions on File Prior to Visit:  butalbital-APAP-caffeine-codeine (FIORICET WITH CODEINE) -34-30 MG per capsule Take 1 capsule by mouth every 6 hours as needed for headaches (do not use more than 3 times per week) To be used as rescue medication if other meds do not work.   lamoTRIgine (LAMICTAL) 100 MG tablet Take 2 tablets (200 mg) by mouth 2 times daily (or as directed)   levETIRAcetam " 1000 MG TABS Take 3,000 mg by mouth 2 times daily   carBAMazepine (CARBATROL) 200 MG 12 hr capsule TAKE 2 CAPS IN AM AND 3 CAPS IN PM.   olopatadine (PATANOL) 0.1 % ophthalmic solution Place 1 drop into both eyes 2 times daily as needed   aspirin 325 MG tablet Take by mouth daily as needed for moderate pain   cetirizine (ZYRTEC) 10 MG tablet Take 1 tablet (10 mg) by mouth every evening (Patient taking differently: Take 10 mg by mouth daily as needed )   fluticasone (FLONASE) 50 MCG/ACT nasal spray Spray 2 sprays into both nostrils daily (Patient taking differently: Spray 2 sprays into both nostrils daily as needed )     No current facility-administered medications on file prior to visit.     RECENT DIAGNOSTIC STUDIES:   Labs:   Results for orders placed or performed in visit on 03/09/17   Basic metabolic panel  (Ca, Cl, CO2, Creat, Gluc, K, Na, BUN)   Result Value Ref Range    Sodium 138 133 - 144 mmol/L    Potassium 3.9 3.4 - 5.3 mmol/L    Chloride 103 94 - 109 mmol/L    Carbon Dioxide 29 20 - 32 mmol/L    Anion Gap 6 3 - 14 mmol/L    Glucose 87 70 - 99 mg/dL    Urea Nitrogen 25 7 - 30 mg/dL    Creatinine 0.90 0.52 - 1.04 mg/dL    GFR Estimate 65 >60 mL/min/1.7m2    GFR Estimate If Black 78 >60 mL/min/1.7m2    Calcium 8.8 8.5 - 10.1 mg/dL   UA with Microscopic reflex to Culture   Result Value Ref Range    Color Urine Yellow     Appearance Urine Clear     Glucose Urine Negative NEG mg/dL    Bilirubin Urine Negative NEG    Ketones Urine Trace (A) NEG mg/dL    Specific Gravity Urine 1.025 1.003 - 1.035    pH Urine 5.5 5.0 - 7.0 pH    Protein Albumin Urine Negative NEG mg/dL    Urobilinogen Urine 0.2 0.2 - 1.0 EU/dL    Nitrite Urine Negative NEG    Blood Urine Negative NEG    Leukocyte Esterase Urine Negative NEG    Source Midstream Urine     WBC Urine O - 2 0 - 2 /HPF    RBC Urine O - 2 0 - 2 /HPF   Albumin Random Urine Quantitative   Result Value Ref Range    Creatinine Urine 72 mg/dL    Albumin Urine mg/L 8 mg/L     Albumin Urine mg/g Cr 11.52 0 - 25 mg/g Cr       REVIEW OF SYSTEMS:                                                      10-point review of systems is negative except as mentioned above in HPI.     EXAM:                                                      Physical Exam:   Vitals: /71 (BP Location: Right arm, Patient Position: Chair, Cuff Size: Adult Regular)  Pulse 84  Wt 151 lb 3.2 oz (68.6 kg)  LMP 2016 (Approximate)  SpO2 100%  BMI 23.86 kg/m2  BMI= Body mass index is 23.86 kg/(m^2).  HEENT: Scleral erythema.  Neurologic:  MENTAL STATUS: Alert, attentive. Speech is fluent. Normal comprehension. Normal concentration. Adequate fund of knowledge. Mini-co/5 (missed one word on recall). She appears a little slow at times.   CRANIAL NERVES: Visual fields intact to confrontation. Pupils (Right 1 mm larger than Left), round and reactive to light. Facial sensation and movement normal. EOM full. Hearing intact to conversation. Trapezius strength intact. Palate elevates symmetrically. Tongue midline.  MOTOR: 5/5 in proximal and distal muscle groups of upper and lower extremities. Tone and bulk normal.   DTRs: Intact and symmetric.  SENSATION: Normal light touch throughout. Vibration is normal at the ankles.   COORDINATION: Normal fine motor movements. Finger tapping normal.   STATION AND GAIT: Romberg negative. Tandem normal.  CV: RRR. S1, S2.   NECK: No bruits.      ASSESSMENT and PLAN:                                                      Assessment and Plan:    ICD-10-CM    1. Seizure disorder (H) G40.909 Carbamazepine and 10 11 epoxide     Keppra (Levetiracetam) Level     Lamotrigine Level     CBC with platelets     Comprehensive metabolic panel (BMP + Alb, Alk Phos, ALT, AST, Total. Bili, TP)   2. Encounter for long-term (current) use of medications Z79.899 Carbamazepine and 10 11 epoxide     Keppra (Levetiracetam) Level     Lamotrigine Level     CBC with platelets     Comprehensive metabolic panel  (BMP + Alb, Alk Phos, ALT, AST, Total. Bili, TP)   3. Migraine without aura and without status migrainosus, not intractable G43.009         Ms. Renee is a pleasant 55 yo woman with history of epilepsy, here for 6-month follow-up. The seizures continue to be well-controlled on Keppra, lamotrigine and Tegretol. We again discussed the dose of Keppra and the patient again wants to continue despite the high level. We talked about potential long-term side effects of continuing the high dose, specifically possible kidney injury. The patient understands and says she still wants to continue. We will check levels and basic labs today. Follow-up in 6 months.     Patient Instructions:  Continue Lamictal 100mg in the morning and 300mg at night.  Continue Tegretol 400mg in the morning and 600mg at night.   Continue Keppra 3000mg twice daily.  Fioricet as needed for headaches (20 tabs ordered today - will keep an eye on frequency of use, Rx requests).   Labs today. We will notify you of the results.  Return to clinic in 6 months.     Total Time: 25 minutes were spent with the patient. More than 50% of the time spent on counseling (as described above in Assessment and Plan) /coordinating the care.    Shayy Mathias MD  Neurology

## 2017-09-19 LAB
LAMOTRIGINE SERPL-MCNC: 2.5 UG/ML (ref 2.5–15)
LEVETIRACETAM SERPL-MCNC: 135 UG/ML (ref 12–46)

## 2017-10-04 LAB — LAB SCANNED RESULT: NORMAL

## 2017-10-05 NOTE — PROGRESS NOTES
Please advise Magaly Renee,  1960, that her Keppra level remains really high. Patient has expressed concern about lowering dose, but again, I think we should: Down at least to 2500mg BID. The other AED levels are in therapeutic range so no change with the Tegretol or Lamictal. Her WBCs are chronically low, but the count has increased to closer to normal.   [phone] (home)   Shayy Mathias

## 2017-10-20 DIAGNOSIS — G43.009 MIGRAINE WITHOUT AURA AND WITHOUT STATUS MIGRAINOSUS, NOT INTRACTABLE: ICD-10-CM

## 2017-10-20 NOTE — TELEPHONE ENCOUNTER
butalbital-APAP-caffeine-codeine (FIORICET WITH CODEINE) -55-30 MG per capsule    Date Last Filled: 09/19/2017  QTY: 20    Li Douglas  [facility]

## 2017-10-21 RX ORDER — BUTALBITAL, ACETAMINOPHEN, CAFFEINE AND CODEINE PHOSPHATE 50; 325; 40; 30 MG/1; MG/1; MG/1; MG/1
1 CAPSULE ORAL EVERY 6 HOURS PRN
Qty: 20 CAPSULE | Refills: 0 | Status: SHIPPED | OUTPATIENT
Start: 2017-10-21 | End: 2017-12-12

## 2017-10-30 DIAGNOSIS — G40.409 OTHER GENERALIZED EPILEPSY, NOT INTRACTABLE, WITHOUT STATUS EPILEPTICUS (H): ICD-10-CM

## 2017-10-31 ENCOUNTER — TELEPHONE (OUTPATIENT)
Dept: FAMILY MEDICINE | Facility: CLINIC | Age: 57
End: 2017-10-31

## 2017-10-31 RX ORDER — CARBAMAZEPINE 200 MG/1
CAPSULE, EXTENDED RELEASE ORAL
Qty: 450 CAPSULE | Refills: 2 | Status: SHIPPED | OUTPATIENT
Start: 2017-10-31 | End: 2018-03-29

## 2017-10-31 NOTE — LETTER
October 31, 2017      Magaly Renee  [address]  [place] [address]        Dear Magaly,     As part of [facility]'s commitment to health and wellness we have recently reviewed your chart and your medical record indicates that you are due for one or more of the following:    -- Pap smear. The last pap that we have on file for you was from 11/2/2012. These are recommended every 3 years. Please call our clinic to schedule your pap smear / physical appointment. At this appointment it would be a good idea to fast for 8-10 hours so we can check your cholesterol and any other needed blood work. Please call the clinic to schedule at [phone].    -- Mammogram. The last mammogram that we have on file for you was from 10/2/2014. Please call one of the following numbers to schedule:  [facility] [phone]  [facility] [phone]  [facility] [phone]  [facility] [phone]  [facility] [phone]  [facility] [phone]    -- Colon screen. Colonoscopy or FIT test. One of these tests is recommended at age 50 to screen for colon cancer. We do not have either test on file for you.   Please call one of the following numbers to schedule a colonoscopy:  [facility] [phone]  [facility] [phone]  [facility] [phone]  [facility] [phone] (multiple sites, call for locations)    A colonoscopy is the gold standard but if you prefer to do a screening that is less invasive and less expensive there is a test for you! It is called the FIT (fecal immunochemical testing) test. It is a screening option that is performed on a yearly basis. This is a test to check for blood in the stool. This test can be done at home and mailed in (you don't even have to pay for postage). If you are willing to do this test, we can order the kit for you to  at our lab or we can mail it to you. Remember, it is always better to do something rather  than nothing.   Please call us at [phone] if you need an order for a colonoscopy or FIT testing.    Please try to schedule and/or complete the tests above within the next 2-4 weeks.     While we work hard to maintain accurate records on all our patients, it is always possible that this notice does not accurately reflect tests that you may have had. To ensure that we do not continue to send you notices please verify, at your next visit or by a [employer] message, that we have accurate dates of your tests, even if these were done many years ago.     Working together for your health is our goal.    Thank you for choosing [facility] for your healthcare needs.      Sincerely,     Madelyn Curry PA-C / chioma

## 2017-10-31 NOTE — TELEPHONE ENCOUNTER
Per 9/18/17 labs and provider note.  Med changes as below:  Keppra Down at least to 2500mg BID. The other AED levels are in therapeutic range so no change with the Tegretol or Lamictal. Her WBCs are chronically low, but the count has increased to closer to normal.     The patient is on carbamazepine (400mg/600mg)      To provider to verify med and dose reconcile and refill.  Tanya Corona RN  [facility]

## 2017-10-31 NOTE — TELEPHONE ENCOUNTER
Panel Management Review      Patient has the following on her problem list: None      Composite cancer screening  Chart review shows that this patient is due/due soon for the following Pap Smear, Mammogram and Colonoscopy  Summary:    Patient is due/failing the following:   COLONOSCOPY, MAMMOGRAM and PAP    Action needed:   Patient needs referral/order: mammo, colon screen  Office visit for pap/physical and fasting labs     Type of outreach:    Sent letter.    Questions for provider review:    None                                                                                                                                    Taylor ABREU       Chart routed to none .

## 2017-12-12 DIAGNOSIS — G43.009 MIGRAINE WITHOUT AURA AND WITHOUT STATUS MIGRAINOSUS, NOT INTRACTABLE: ICD-10-CM

## 2017-12-12 RX ORDER — BUTALBITAL, ACETAMINOPHEN, CAFFEINE AND CODEINE PHOSPHATE 50; 325; 40; 30 MG/1; MG/1; MG/1; MG/1
1 CAPSULE ORAL EVERY 6 HOURS PRN
Qty: 20 CAPSULE | Refills: 0 | Status: SHIPPED | OUTPATIENT
Start: 2017-12-12 | End: 2018-01-16

## 2017-12-12 NOTE — TELEPHONE ENCOUNTER
butalbital-APAP-caffeine-codeine (FIORICET WITH CODEINE) -00-30 MG per capsule    Date Last Filled: 10/23/17  QTY: 20    Li Douglas  [facility]

## 2017-12-26 ENCOUNTER — TELEPHONE (OUTPATIENT)
Dept: LAB | Facility: CLINIC | Age: 57
End: 2017-12-26

## 2017-12-26 DIAGNOSIS — Z13.6 CARDIOVASCULAR SCREENING; LDL GOAL LESS THAN 160: Primary | ICD-10-CM

## 2017-12-26 NOTE — TELEPHONE ENCOUNTER
Madelyn:    See CMP drawn in September by another provider.  Does she still need BMP ordered in 3/2017?  What about FLP?    Jazmyn BROOKS RN

## 2018-01-12 ENCOUNTER — TELEPHONE (OUTPATIENT)
Dept: FAMILY MEDICINE | Facility: CLINIC | Age: 58
End: 2018-01-12

## 2018-01-12 NOTE — LETTER
Magaly Renee  [address]  [place] [address]        Dear Karen,     As part of [facility]'s commitment to health and wellness we have recently reviewed your chart and your medical record indicates that you are due for one or more of the following:    -- Pap smear. The last pap that we have on file for you was from November 2011. These are recommended every 3 years. Please call our clinic to schedule your pap smear / physical appointment.     Please try to schedule and/or complete the tests above within the next 2-4 weeks.   The number to call to schedule an appointment at [facility] is [phone].    While we work hard to maintain accurate records on all our patients, it is always possible that this notice does not accurately reflect tests that you may have had. To ensure that we do not continue to send you notices please verify, at your next visit or by a Contactual message, that we have accurate dates of your tests, even if these were done many years ago.     Working together for your health is our goal.    Thank you for choosing [place] for your healthcare needs.      Sincerely,     Madelyn Curry PA-C, Willian GLEASON, CMA

## 2018-01-12 NOTE — TELEPHONE ENCOUNTER
Panel Management Review      Patient has the following on her problem list: None      Composite cancer screening  Chart review shows that this patient is due/due soon for the following Pap Smear, Mammogram and Colonoscopy  Summary:    Patient is due/failing the following:   COLONOSCOPY, MAMMOGRAM, PAP and PHYSICAL    Action needed:   Patient needs office visit for a physical and pap. Also need to discuss a Mammogram and colonoscopy.     Type of outreach:    Sent letter.    Questions for provider review:    None                                                                                                                                    Willian Ellison CMA       Chart routed to self.

## 2018-01-16 DIAGNOSIS — G43.009 MIGRAINE WITHOUT AURA AND WITHOUT STATUS MIGRAINOSUS, NOT INTRACTABLE: ICD-10-CM

## 2018-01-16 NOTE — TELEPHONE ENCOUNTER
butalbital-APAP-caffeine-codeine (FIORICET WITH CODEINE) -71-30 MG per capsule    Date Last Filled: 12/13/17  QTY: 20    [facility]

## 2018-01-17 RX ORDER — BUTALBITAL, ACETAMINOPHEN, CAFFEINE AND CODEINE PHOSPHATE 50; 325; 40; 30 MG/1; MG/1; MG/1; MG/1
1 CAPSULE ORAL EVERY 6 HOURS PRN
Qty: 20 CAPSULE | Refills: 0 | Status: SHIPPED | OUTPATIENT
Start: 2018-01-17 | End: 2018-03-12

## 2018-02-09 ENCOUNTER — TELEPHONE (OUTPATIENT)
Dept: NEUROLOGY | Facility: CLINIC | Age: 58
End: 2018-02-09

## 2018-02-09 DIAGNOSIS — G40.409 OTHER GENERALIZED EPILEPSY, NOT INTRACTABLE, WITHOUT STATUS EPILEPTICUS (H): ICD-10-CM

## 2018-02-09 RX ORDER — LEVETIRACETAM 1000 MG/1
3000 TABLET ORAL 2 TIMES DAILY
Qty: 180 TABLET | Refills: 0 | Status: SHIPPED | OUTPATIENT
Start: 2018-02-09 | End: 2018-03-12

## 2018-02-09 NOTE — TELEPHONE ENCOUNTER
Notes Recorded by Shayy Mathias MD on 10/5/2017 at 10:04 AM  Please advise Magaly RILEY Thelma,  1960, that her Keppra level remains really high. Patient has expressed concern about lowering dose, but again, I think we should: Down at least to 2500mg BID. The other AED levels are in therapeutic range so no change with the Tegretol or Lamictal. Her WBCs are chronically low, but the count has increased to closer to normal.   [phone] (home)   Shayy Mathias

## 2018-02-09 NOTE — TELEPHONE ENCOUNTER
"CLARI- Dr Mathias- 1 month Keppra dose refilled.     Spoke to patient who stated: \"I have been taking the full dose of Keppra and I told her I was not comfortable taking that lower dose. I don't want to go down- I am afraid if I do, I will have a Seizure. I am smack out. I picked up another refill this am and realized I am out of it.. I just can't go without it.. I will be out this weekend and I don't want to have a Seizure and my Keppra level has always been high\". \"    Also advised that she needs to reschedule her 3-14-18 Neurology appt as Provider is out of the office that day.     I did advise I would refill the medication for 1 month, but will send this to Dr Mathias as patient was advised per 10-5-17 Keppra level result note that she should decrease dose to 2500 mg BID, but she stated she \"did not and is not willing to decrease dose and has been taking 3000 mg twice daily of Keppra everyday\"    Carmen Tomas RN        "

## 2018-02-09 NOTE — TELEPHONE ENCOUNTER
Pt called stating that she will be out of her levETIRAcetam 1000 MG TABS after tonight. Pt states that she needs the RX sent to [facility]. Please contact pt when completed.    Li Douglas  [facility] [place]

## 2018-02-22 DIAGNOSIS — H10.45 CHRONIC ALLERGIC CONJUNCTIVITIS: ICD-10-CM

## 2018-02-22 NOTE — TELEPHONE ENCOUNTER
"OLOPATADINE HCL 0.1% SOLN        Last Written Prescription Date:  1/24/17  Last Fill Quantity: 5,   # refills: 3  [facility]  Future Office visit:    Next 5 appointments (look out 90 days)     Mar 14, 2018  8:45 AM CDT   Return Visit with Shayy Mathias MD   [facility]    [address] [address]   [phone]                   Requested Prescriptions   Pending Prescriptions Disp Refills     olopatadine (PATANOL) 0.1 % ophthalmic solution [Pharmacy Med Name: OLOPATADINE HCL 0.1% SOLN] 5 mL 3     Sig: INSTILL 1 DROP INTO BOTH EYES TWO TIMES A DAY AS NEEDED    Miscellaneous Opthalmic Allergy Drops Protocol Passed    2/22/2018  3:11 PM       Passed - Patient is age 4 or older       Passed - Recent or future visit with authorizing provider's specialty    Patient had office visit in the last year or has a visit in the next 30 days with authorizing provider.  See \"Patient Info\" tab in inbasket, or \"Choose Columns\" in Meds & Orders section of the refill encounter.            Passed - Patient is not pregnant       Passed - No positive pregnancy test on record in past 12 mos          "

## 2018-02-23 RX ORDER — OLOPATADINE HYDROCHLORIDE 1 MG/ML
SOLUTION/ DROPS OPHTHALMIC
Qty: 5 ML | Refills: 0 | Status: SHIPPED | OUTPATIENT
Start: 2018-02-23 | End: 2021-02-02

## 2018-02-23 NOTE — TELEPHONE ENCOUNTER
Prescription approved per [facility] Refill Protocol.  Holley Temple RN

## 2018-03-12 DIAGNOSIS — G43.009 MIGRAINE WITHOUT AURA AND WITHOUT STATUS MIGRAINOSUS, NOT INTRACTABLE: ICD-10-CM

## 2018-03-12 DIAGNOSIS — G40.409 OTHER GENERALIZED EPILEPSY, NOT INTRACTABLE, WITHOUT STATUS EPILEPTICUS (H): ICD-10-CM

## 2018-03-12 RX ORDER — LEVETIRACETAM 1000 MG/1
3000 TABLET ORAL 2 TIMES DAILY
Qty: 180 TABLET | Refills: 0 | Status: SHIPPED | OUTPATIENT
Start: 2018-03-12 | End: 2018-03-29

## 2018-03-12 NOTE — TELEPHONE ENCOUNTER
One month of Keppra sent to get pt through until appt on 3/29/18.  Unable to refill Fioricet. Sent to provider for review.  Berkley COREA RN BSN PHN  Specialty Clinics

## 2018-03-16 RX ORDER — BUTALBITAL, ACETAMINOPHEN, CAFFEINE AND CODEINE PHOSPHATE 50; 325; 40; 30 MG/1; MG/1; MG/1; MG/1
1 CAPSULE ORAL EVERY 6 HOURS PRN
Qty: 20 CAPSULE | Refills: 0 | Status: SHIPPED | OUTPATIENT
Start: 2018-03-16 | End: 2018-04-16

## 2018-03-19 DIAGNOSIS — H10.13 ALLERGIC CONJUNCTIVITIS, BILATERAL: ICD-10-CM

## 2018-03-19 DIAGNOSIS — J30.2 SEASONAL ALLERGIC RHINITIS: ICD-10-CM

## 2018-03-19 RX ORDER — CETIRIZINE HYDROCHLORIDE 10 MG/1
10 TABLET ORAL EVERY MORNING
Qty: 30 TABLET | Refills: 0 | Status: SHIPPED | OUTPATIENT
Start: 2018-03-19 | End: 2021-02-02

## 2018-03-19 NOTE — TELEPHONE ENCOUNTER
"CETIRIZINE HCL 10MG TABS        Last Written Prescription Date:  3/11/16  Last Fill Quantity: 30,   # refills: 2  [facility]  Future Office visit:    Next 5 appointments (look out 90 days)     Mar 29, 2018  2:15 PM CDT   Return Visit with Shayy Mathias MD   [facility] ([facility]    [address] [address]   [phone]                   Requested Prescriptions   Pending Prescriptions Disp Refills     cetirizine (ZYRTEC) 10 MG tablet [Pharmacy Med Name: CETIRIZINE HCL 10MG TABS] 30 tablet 2     Sig: TAKE ONE TABLET BY MOUTH EVERY EVENING    Antihistamines Protocol Passed    3/19/2018  8:35 AM       Passed - Patient is 3-64 years of age    Apply weight-based dosing for peds patients age 3 - 12 years of age.    Forward request to provider for patients under the age of 3 or over the age of 64.         Passed - Recent (12 mo) or future (30 days) visit within the authorizing provider's specialty    Patient had office visit in the last 12 months or has a visit in the next 30 days with authorizing provider or within the authorizing provider's specialty.  See \"Patient Info\" tab in inbasket, or \"Choose Columns\" in Meds & Orders section of the refill encounter.              "

## 2018-03-29 ENCOUNTER — OFFICE VISIT (OUTPATIENT)
Dept: NEUROLOGY | Facility: CLINIC | Age: 58
End: 2018-03-29
Payer: COMMERCIAL

## 2018-03-29 VITALS
DIASTOLIC BLOOD PRESSURE: 55 MMHG | TEMPERATURE: 98.2 F | RESPIRATION RATE: 12 BRPM | HEART RATE: 73 BPM | SYSTOLIC BLOOD PRESSURE: 96 MMHG

## 2018-03-29 DIAGNOSIS — Z79.899 ENCOUNTER FOR LONG-TERM CURRENT USE OF MEDICATION: ICD-10-CM

## 2018-03-29 DIAGNOSIS — G40.409 OTHER GENERALIZED EPILEPSY, NOT INTRACTABLE, WITHOUT STATUS EPILEPTICUS (H): ICD-10-CM

## 2018-03-29 DIAGNOSIS — G40.909 SEIZURE DISORDER (H): Primary | ICD-10-CM

## 2018-03-29 LAB
ALBUMIN SERPL-MCNC: 3.9 G/DL (ref 3.4–5)
ALP SERPL-CCNC: 85 U/L (ref 40–150)
ALT SERPL W P-5'-P-CCNC: 24 U/L (ref 0–50)
ANION GAP SERPL CALCULATED.3IONS-SCNC: 4 MMOL/L (ref 3–14)
AST SERPL W P-5'-P-CCNC: 15 U/L (ref 0–45)
BILIRUB SERPL-MCNC: 0.1 MG/DL (ref 0.2–1.3)
BUN SERPL-MCNC: 23 MG/DL (ref 7–30)
CALCIUM SERPL-MCNC: 8.4 MG/DL (ref 8.5–10.1)
CHLORIDE SERPL-SCNC: 107 MMOL/L (ref 94–109)
CO2 SERPL-SCNC: 31 MMOL/L (ref 20–32)
CREAT SERPL-MCNC: 0.66 MG/DL (ref 0.52–1.04)
ERYTHROCYTE [DISTWIDTH] IN BLOOD BY AUTOMATED COUNT: 11.6 % (ref 10–15)
GFR SERPL CREATININE-BSD FRML MDRD: >90 ML/MIN/1.7M2
GLUCOSE SERPL-MCNC: 91 MG/DL (ref 70–99)
HCT VFR BLD AUTO: 40.2 % (ref 35–47)
HGB BLD-MCNC: 13.1 G/DL (ref 11.7–15.7)
MCH RBC QN AUTO: 32 PG (ref 26.5–33)
MCHC RBC AUTO-ENTMCNC: 32.6 G/DL (ref 31.5–36.5)
MCV RBC AUTO: 98 FL (ref 78–100)
PLATELET # BLD AUTO: 197 10E9/L (ref 150–450)
POTASSIUM SERPL-SCNC: 3.8 MMOL/L (ref 3.4–5.3)
PROT SERPL-MCNC: 7.5 G/DL (ref 6.8–8.8)
RBC # BLD AUTO: 4.1 10E12/L (ref 3.8–5.2)
SODIUM SERPL-SCNC: 142 MMOL/L (ref 133–144)
WBC # BLD AUTO: 3.6 10E9/L (ref 4–11)

## 2018-03-29 PROCEDURE — 99214 OFFICE O/P EST MOD 30 MIN: CPT | Performed by: PSYCHIATRY & NEUROLOGY

## 2018-03-29 PROCEDURE — 85027 COMPLETE CBC AUTOMATED: CPT | Performed by: PSYCHIATRY & NEUROLOGY

## 2018-03-29 PROCEDURE — 80339 ANTIEPILEPTICS NOS 1-3: CPT | Mod: 90 | Performed by: PSYCHIATRY & NEUROLOGY

## 2018-03-29 PROCEDURE — 36415 COLL VENOUS BLD VENIPUNCTURE: CPT | Performed by: PSYCHIATRY & NEUROLOGY

## 2018-03-29 PROCEDURE — 80175 DRUG SCREEN QUAN LAMOTRIGINE: CPT | Mod: 90 | Performed by: PSYCHIATRY & NEUROLOGY

## 2018-03-29 PROCEDURE — 99000 SPECIMEN HANDLING OFFICE-LAB: CPT | Performed by: PSYCHIATRY & NEUROLOGY

## 2018-03-29 PROCEDURE — 80053 COMPREHEN METABOLIC PANEL: CPT | Performed by: PSYCHIATRY & NEUROLOGY

## 2018-03-29 PROCEDURE — 80177 DRUG SCRN QUAN LEVETIRACETAM: CPT | Mod: 90 | Performed by: PSYCHIATRY & NEUROLOGY

## 2018-03-29 PROCEDURE — 80156 ASSAY CARBAMAZEPINE TOTAL: CPT | Mod: 90 | Performed by: PSYCHIATRY & NEUROLOGY

## 2018-03-29 RX ORDER — CARBAMAZEPINE 200 MG/1
CAPSULE, EXTENDED RELEASE ORAL
Qty: 450 CAPSULE | Refills: 2 | Status: SHIPPED | OUTPATIENT
Start: 2018-03-29 | End: 2019-01-03

## 2018-03-29 RX ORDER — LEVETIRACETAM 1000 MG/1
3000 TABLET ORAL 2 TIMES DAILY
Qty: 180 TABLET | Refills: 5 | Status: SHIPPED | OUTPATIENT
Start: 2018-03-29 | End: 2018-09-24

## 2018-03-29 RX ORDER — LAMOTRIGINE 100 MG/1
200 TABLET ORAL 2 TIMES DAILY
Qty: 360 TABLET | Refills: 3 | Status: SHIPPED | OUTPATIENT
Start: 2018-03-29 | End: 2019-03-12

## 2018-03-29 NOTE — PROGRESS NOTES
ESTABLISHED PATIENT NEUROLOGY NOTE    DATE OF VISIT: 3/29/2018  MRN: [id number]  PATIENT NAME: Magaly Renee  YOB: 1960    Chief Complaint   Patient presents with     RECHECK     Seizures     SUBJECTIVE:                                                      HISTORY OF PRESENT ILLNESS:  Magaly is here for follow up regarding seizures. The patient is on carbamazepine (400mg/600mg), Keppra 3gm BID and lamotrigine (100mg/300mg). When I met with the patient about 6 months ago, she reported that she continued to be seizure-free. Her seizures started at age 11 or 12. She has history of staring spells and generalized tonic clonic seizures.     Carbamazepine level was 6.4, epoxide was 1.4, lamotrigine level was 2.5 and Keppra level was 135. The latter is high, the rest are in therapeutic range.     She denies any seizures since her last visit. Her last seizure was a couple of years ago (early , a GTC) in the setting of  medication use.     She has been on high dose Keppra for some time, and the level correspondingly runs high. We have discussed decreasing the dose several times in the past. The patient tells me that she decided not to decrease the Keppra as I had recommended. She is doing well on the current doses of all of her medications. No recent problems with the double vision she has experienced in the past.     She has been hesitant because she does not want to have a breakthrough seizure and not be able to drive. 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 She has grown sons now that can drive her around if something were to happen.    She still occasionally needs the Fioricet. She usually tries aspirin first. She takes about 3 Fioricet per month. This resolves the headaches rapidly. No severe headaches or change in the headaches.     No new concerns.     Additional seizure work-up history per Dr. Camargo's 1.5.15 note:  MRI brain imaging  previously which has not shown any structural abnormalities. She has had EEGs in 2001, 2003 and 2012, all of which showed generalized epileptiform discharges predominantly bifrontally; this was consistent with generalized epilepsy. The last EEG in 2012 showed question of focal onset in the left central region consisting of rhythmic slowing.    CURRENT MEDICATIONS:     Current Outpatient Prescriptions on File Prior to Visit:  cetirizine (ZYRTEC) 10 MG tablet Take 1 tablet (10 mg) by mouth every morning (Needs follow-up appointment for this medication)   butalbital-APAP-caffeine-codeine (FIORICET WITH CODEINE) -88-30 MG per capsule Take 1 capsule by mouth every 6 hours as needed for headaches (do not use more than 3 times per week) To be used as rescue medication if other meds do not work.   olopatadine (PATANOL) 0.1 % ophthalmic solution INSTILL 1 DROP INTO BOTH EYES TWO TIMES A DAY AS NEEDED   aspirin 325 MG tablet Take by mouth daily as needed for moderate pain   fluticasone (FLONASE) 50 MCG/ACT nasal spray Spray 2 sprays into both nostrils daily (Patient taking differently: Spray 2 sprays into both nostrils daily as needed )   [DISCONTINUED] levETIRAcetam 1000 MG TABS Take 3,000 mg by mouth 2 times daily   [DISCONTINUED] carBAMazepine (CARBATROL) 200 MG 12 hr capsule TAKE 2 CAPS IN AM AND 3 CAPS IN PM.   [DISCONTINUED] lamoTRIgine (LAMICTAL) 100 MG tablet Take 2 tablets (200 mg) by mouth 2 times daily (or as directed)     No current facility-administered medications on file prior to visit.     RECENT DIAGNOSTIC STUDIES:   Labs:   Results for orders placed or performed in visit on 09/18/17   Carbamazepine and 10 11 epoxide   Result Value Ref Range    Lab Scanned Result CARBAMAZEPINE & EPOX-Scanned    Keppra (Levetiracetam) Level   Result Value Ref Range    Keppra (Levetiracetam) Level 135 (H) 12 - 46 ug/mL   Lamotrigine Level   Result Value Ref Range    Lamotrigine Level 2.5 2.5 - 15.0 ug/mL   CBC with  platelets   Result Value Ref Range    WBC 3.2 (L) 4.0 - 11.0 10e9/L    RBC Count 4.11 3.8 - 5.2 10e12/L    Hemoglobin 13.4 11.7 - 15.7 g/dL    Hematocrit 40.6 35.0 - 47.0 %    MCV 99 78 - 100 fl    MCH 32.6 26.5 - 33.0 pg    MCHC 33.0 31.5 - 36.5 g/dL    RDW 11.2 10.0 - 15.0 %    Platelet Count 176 150 - 450 10e9/L   Comprehensive metabolic panel (BMP + Alb, Alk Phos, ALT, AST, Total. Bili, TP)   Result Value Ref Range    Sodium 138 133 - 144 mmol/L    Potassium 4.1 3.4 - 5.3 mmol/L    Chloride 105 94 - 109 mmol/L    Carbon Dioxide 27 20 - 32 mmol/L    Anion Gap 6 3 - 14 mmol/L    Glucose 98 70 - 99 mg/dL    Urea Nitrogen 24 7 - 30 mg/dL    Creatinine 0.76 0.52 - 1.04 mg/dL    GFR Estimate 78 >60 mL/min/1.7m2    GFR Estimate If Black >90 >60 mL/min/1.7m2    Calcium 8.7 8.5 - 10.1 mg/dL    Bilirubin Total 0.3 0.2 - 1.3 mg/dL    Albumin 4.1 3.4 - 5.0 g/dL    Protein Total 7.5 6.8 - 8.8 g/dL    Alkaline Phosphatase 79 40 - 150 U/L    ALT 27 0 - 50 U/L    AST 15 0 - 45 U/L       REVIEW OF SYSTEMS:                                                      10-point review of systems is negative except as mentioned above in HPI.    EXAM:                                                      Physical Exam:   Vitals: BP 96/55 (BP Location: Right arm, Patient Position: Sitting, Cuff Size: Adult Regular)  Pulse 73  Temp 98.2  F (36.8  C) (Oral)  Resp 12  LMP 07/06/2016 (Approximate)  BMI= There is no height or weight on file to calculate BMI.  HEENT: Scleral erythema.  Neurologic:  MENTAL STATUS: Alert, attentive. Speech is fluent. Normal comprehension. Normal concentration. Adequate fund of knowledge. She appears a little slow at times.   CRANIAL NERVES: Visual fields intact to confrontation. Pupils (Right 1 mm larger than Left), round and reactive to light. Facial sensation and movement normal. EOM full. Hearing intact to conversation. Trapezius strength intact. Palate elevates symmetrically. Tongue midline.  MOTOR: 5/5 in  proximal and distal muscle groups of upper and lower extremities. Tone and bulk normal.   DTRs: Intact and symmetric.  SENSATION: Normal light touch throughout. Vibration is normal at the ankles.   COORDINATION: Normal fine motor movements. Finger tapping normal.   STATION AND GAIT:  Gait normal.   CV: RRR. S1, S2.   NECK: No bruits.    ASSESSMENT and PLAN:                                                      Assessment and Plan:    ICD-10-CM    1. Seizure disorder (H) G40.909 Keppra (Levetiracetam) Level     Carbamazepine and 10 11 epoxide     Lamotrigine Level     CBC with platelets     Comprehensive metabolic panel (BMP + Alb, Alk Phos, ALT, AST, Total. Bili, TP)   2. Encounter for long-term current use of medication Z79.899 Keppra (Levetiracetam) Level     Carbamazepine and 10 11 epoxide     Lamotrigine Level     CBC with platelets     Comprehensive metabolic panel (BMP + Alb, Alk Phos, ALT, AST, Total. Bili, TP)   3. Other generalized epilepsy, not intractable, without status epilepticus (H) G40.409 carBAMazepine (CARBATROL) 200 MG 12 hr capsule     lamoTRIgine (LAMICTAL) 100 MG tablet     levETIRAcetam 1000 MG TABS       Ms. Renee is a pleasant 56 yo woman with seizures since childhood. She has been doing well on triple AED therapy. No seizures for about 2 years now. We again discussed the high dose of Keppra and the likely safety of lowering the dose to the therapeutic range. I note that we could consider an increase in the carbamazepine if we were to lower the Keppra, if this eases her mind in terms of protection against seizures. The patient will think about making a change later this year when her responsibilities outside of the home decrease. We will check levels today and basic labs. Plan is for 6-month follow-up. The patient understands and agrees with the plan.     Patient Instructions:  Labs today: medication levels, CBC, metabolic panel. We willnotify you of the results.   Continue current doses  of the Keppra, Lamotrigine and Carbamazepine for now.  Let me know if you decide to lower the Keppra dose - I will advise you on the dosing again at that time.   Continue the Fioricet as needed for headaches.   Return to clinic in 6 months.    Total Time: 25 minutes were spent with the patient. More than 50% of the time spent on counseling (as described above in Assessment and Plan) /coordinating the care.    Shayy Mathias MD  Neurology

## 2018-03-29 NOTE — NURSING NOTE
"Chief Complaint   Patient presents with     RECHECK     Seizures       Initial BP 96/55 (BP Location: Right arm, Patient Position: Sitting, Cuff Size: Adult Regular)  Pulse 73  Temp 98.2  F (36.8  C) (Oral)  Resp 12  LMP 07/06/2016 (Approximate) Estimated body mass index is 23.86 kg/(m^2) as calculated from the following:    Height as of 5/4/17: 1.695 m (5' 6.75\").    Weight as of 9/18/17: 68.6 kg (151 lb 3.2 oz).  Medication Reconciliation: complete    Patient prefers to be contacted: [phone]  Okay to leave detailed message on voicemail: yes      Nahomy THRASHER-BERTHA    "

## 2018-03-29 NOTE — MR AVS SNAPSHOT
"              After Visit Summary   3/29/2018    Magaly Renee    MRN: [id number]           Patient Information     Date Of Birth          1960        Visit Information        Provider Department      3/29/2018 2:15 PM Shayy Mathias MD [facility]        Today's Diagnoses     Seizure disorder (H)    -  1    Encounter for long-term current use of medication          Care Instructions    Plan:       Labs today: medication levels, CBC, metabolic panel. We willnotify you of the results.   Continue current doses of the Keppra, Lamotrigine and Carbamazepine for now.  Let me know if you decide to lower the Keppra dose - I will advise you on the dosing again at that time.   Continue the Fioricet as needed for headaches.   Return to clinic in 6 months.           Follow-ups after your visit        Who to contact     If you have questions or need follow up information about today's clinic visit or your schedule please contact [facility] directly at [phone].  Normal or non-critical lab and imaging results will be communicated to you by MySQUARhart, letter or phone within 4 business days after the clinic has received the results. If you do not hear from us within 7 days, please contact the clinic through MySQUARhart or phone. If you have a critical or abnormal lab result, we will notify you by phone as soon as possible.  Submit refill requests through xkoto or call your pharmacy and they will forward the refill request to us. Please allow 3 business days for your refill to be completed.          Additional Information About Your Visit        MySQUARhart Information     xkoto lets you send messages to your doctor, view your test results, renew your prescriptions, schedule appointments and more. To sign up, go to [email or web address] . Click on \"Log in\" on the left side of the screen, which will take you to the Welcome page. Then click on \"Sign up Now\" on the right side of the page.     You " will be asked to enter the access code listed below, as well as some personal information. Please follow the directions to create your username and password.     Your access code is: [email or web address]  Expires: 2018  2:51 PM     Your access code will  in 90 days. If you need help or a new code, please call your [facility] or [phone].        Care EveryWhere ID     This is your Care EveryWhere ID. This could be used by other organizations to access your [facility] medical records  [id number]        Your Vitals Were     Pulse Temperature Respirations Last Period          73 98.2  F (36.8  C) (Oral) 12 2016 (Approximate)         Blood Pressure from Last 3 Encounters:   18 96/55   17 107/71   17 108/62    Weight from Last 3 Encounters:   17 68.6 kg (151 lb 3.2 oz)   17 68 kg (150 lb)   17 68.9 kg (152 lb)              We Performed the Following     Carbamazepine and 10 11 epoxide     CBC with platelets     Comprehensive metabolic panel (BMP + Alb, Alk Phos, ALT, AST, Total. Bili, TP)     Keppra (Levetiracetam) Level     Lamotrigine Level          Today's Medication Changes          These changes are accurate as of 3/29/18  2:51 PM.  If you have any questions, ask your nurse or doctor.               These medicines have changed or have updated prescriptions.        Dose/Directions    fluticasone 50 MCG/ACT spray   Commonly known as:  FLONASE   This may have changed:    - when to take this  - reasons to take this   Used for:  ETD (eustachian tube dysfunction)        Dose:  2 spray   Spray 2 sprays into both nostrils daily   Quantity:  1 Package   Refills:  1                Primary Care Provider Office Phone # Fax #    [facility] [phone] [address] [address]        Equal Access to Services     JOSÉ MIGUEL CASH AH: Rnee Turner, etta espinosa, remberto rosa  lajose braun. So [facility] [phone].    ATENCIÓN: Si marco ala tabatha, tiene a mack disposición servicios gratuitos de asistencia lingüística. Acacia estrada [phone].    We comply with applicable federal civil rights laws and [place] laws. We do not discriminate on the basis of race, color, national origin, age, disability, sex, sexual orientation, or gender identity.            Thank you!     Thank you for choosing [facility]  for your care. Our goal is always to provide you with excellent care. Hearing back from our patients is one way we can continue to improve our services. Please take a few minutes to complete the written survey that you may receive in the mail after your visit with us. Thank you!             Your Updated Medication List - Protect others around you: Learn how to safely use, store and throw away your medicines at www.disposemymeds.org.          This list is accurate as of 3/29/18  2:51 PM.  Always use your most recent med list.                   Brand Name Dispense Instructions for use Diagnosis    aspirin 325 MG tablet      Take by mouth daily as needed for moderate pain        butalbital-APAP-caffeine-codeine -16-30 MG per capsule    FIORICET WITH codeine    20 capsule    Take 1 capsule by mouth every 6 hours as needed for headaches (do not use more than 3 times per week) To be used as rescue medication if other meds do not work.    Migraine without aura and without status migrainosus, not intractable       carBAMazepine 200 MG 12 hr capsule    CARBATROL    450 capsule    TAKE 2 CAPS IN AM AND 3 CAPS IN PM.    Other generalized epilepsy, not intractable, without status epilepticus (H)       cetirizine 10 MG tablet    zyrTEC    30 tablet    Take 1 tablet (10 mg) by mouth every morning (Needs follow-up appointment for this medication)    Allergic conjunctivitis, bilateral, Seasonal allergic rhinitis       fluticasone 50 MCG/ACT spray    FLONASE    1 Package    Spray 2 sprays into both  nostrils daily    ETD (eustachian tube dysfunction)       lamoTRIgine 100 MG tablet    LaMICtal    360 tablet    Take 2 tablets (200 mg) by mouth 2 times daily (or as directed)    Other generalized epilepsy, not intractable, without status epilepticus (H)       levETIRAcetam 1000 MG Tabs     180 tablet    Take 3,000 mg by mouth 2 times daily    Other generalized epilepsy, not intractable, without status epilepticus (H)       olopatadine 0.1 % ophthalmic solution    PATANOL    5 mL    INSTILL 1 DROP INTO BOTH EYES TWO TIMES A DAY AS NEEDED    Chronic allergic conjunctivitis

## 2018-03-29 NOTE — LETTER
3/29/2018         RE: Magaly Renee  [address] [place] [address]        Dear Colleague,    Thank you for referring your patient, Magaly Renee, to the [facility]. Please see a copy of my visit note below.    ESTABLISHED PATIENT NEUROLOGY NOTE    DATE OF VISIT: 3/29/2018  MRN: [id number]  PATIENT NAME: Magaly Renee  YOB: 1960    Chief Complaint   Patient presents with     RECHECK     Seizures     SUBJECTIVE:                                                      HISTORY OF PRESENT ILLNESS:  Magaly is here for follow up regarding seizures. The patient is on carbamazepine (400mg/600mg), Keppra 3gm BID and lamotrigine (100mg/300mg). When I met with the patient about 6 months ago, she reported that she continued to be seizure-free. Her seizures started at age 11 or 12. She has history of staring spells and generalized tonic clonic seizures.     Carbamazepine level was 6.4, epoxide was 1.4, lamotrigine level was 2.5 and Keppra level was 135. The latter is high, the rest are in therapeutic range.     She denies any seizures since her last visit. Her last seizure was a couple of years ago (early , a GTC) in the setting of  medication use.     She has been on high dose Keppra for some time, and the level correspondingly runs high. We have discussed decreasing the dose several times in the past. The patient tells me that she decided not to decrease the Keppra as I had recommended. She is doing well on the current doses of all of her medications. No recent problems with the double vision she has experienced in the past.     She has been hesitant because she does not want to have a breakthrough seizure and not be able to drive. 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 She has grown sons now that can drive her around if something were to happen.    She still occasionally needs the Fioricet. She usually tries  aspirin first. She takes about 3 Fioricet per month. This resolves the headaches rapidly. No severe headaches or change in the headaches.     No new concerns.     Additional seizure work-up history per Dr. Camargo's 1.5.15 note:  MRI brain imaging previously which has not shown any structural abnormalities. She has had EEGs in 2001, 2003 and 2012, all of which showed generalized epileptiform discharges predominantly bifrontally; this was consistent with generalized epilepsy. The last EEG in 2012 showed question of focal onset in the left central region consisting of rhythmic slowing.    CURRENT MEDICATIONS:     Current Outpatient Prescriptions on File Prior to Visit:  cetirizine (ZYRTEC) 10 MG tablet Take 1 tablet (10 mg) by mouth every morning (Needs follow-up appointment for this medication)   butalbital-APAP-caffeine-codeine (FIORICET WITH CODEINE) -98-30 MG per capsule Take 1 capsule by mouth every 6 hours as needed for headaches (do not use more than 3 times per week) To be used as rescue medication if other meds do not work.   olopatadine (PATANOL) 0.1 % ophthalmic solution INSTILL 1 DROP INTO BOTH EYES TWO TIMES A DAY AS NEEDED   aspirin 325 MG tablet Take by mouth daily as needed for moderate pain   fluticasone (FLONASE) 50 MCG/ACT nasal spray Spray 2 sprays into both nostrils daily (Patient taking differently: Spray 2 sprays into both nostrils daily as needed )   [DISCONTINUED] levETIRAcetam 1000 MG TABS Take 3,000 mg by mouth 2 times daily   [DISCONTINUED] carBAMazepine (CARBATROL) 200 MG 12 hr capsule TAKE 2 CAPS IN AM AND 3 CAPS IN PM.   [DISCONTINUED] lamoTRIgine (LAMICTAL) 100 MG tablet Take 2 tablets (200 mg) by mouth 2 times daily (or as directed)     No current facility-administered medications on file prior to visit.     RECENT DIAGNOSTIC STUDIES:   Labs:   Results for orders placed or performed in visit on 09/18/17   Carbamazepine and 10 11 epoxide   Result Value Ref Range    Lab Scanned Result  CARBAMAZEPINE & EPOX-Scanned    Keppra (Levetiracetam) Level   Result Value Ref Range    Keppra (Levetiracetam) Level 135 (H) 12 - 46 ug/mL   Lamotrigine Level   Result Value Ref Range    Lamotrigine Level 2.5 2.5 - 15.0 ug/mL   CBC with platelets   Result Value Ref Range    WBC 3.2 (L) 4.0 - 11.0 10e9/L    RBC Count 4.11 3.8 - 5.2 10e12/L    Hemoglobin 13.4 11.7 - 15.7 g/dL    Hematocrit 40.6 35.0 - 47.0 %    MCV 99 78 - 100 fl    MCH 32.6 26.5 - 33.0 pg    MCHC 33.0 31.5 - 36.5 g/dL    RDW 11.2 10.0 - 15.0 %    Platelet Count 176 150 - 450 10e9/L   Comprehensive metabolic panel (BMP + Alb, Alk Phos, ALT, AST, Total. Bili, TP)   Result Value Ref Range    Sodium 138 133 - 144 mmol/L    Potassium 4.1 3.4 - 5.3 mmol/L    Chloride 105 94 - 109 mmol/L    Carbon Dioxide 27 20 - 32 mmol/L    Anion Gap 6 3 - 14 mmol/L    Glucose 98 70 - 99 mg/dL    Urea Nitrogen 24 7 - 30 mg/dL    Creatinine 0.76 0.52 - 1.04 mg/dL    GFR Estimate 78 >60 mL/min/1.7m2    GFR Estimate If Black >90 >60 mL/min/1.7m2    Calcium 8.7 8.5 - 10.1 mg/dL    Bilirubin Total 0.3 0.2 - 1.3 mg/dL    Albumin 4.1 3.4 - 5.0 g/dL    Protein Total 7.5 6.8 - 8.8 g/dL    Alkaline Phosphatase 79 40 - 150 U/L    ALT 27 0 - 50 U/L    AST 15 0 - 45 U/L       REVIEW OF SYSTEMS:                                                      10-point review of systems is negative except as mentioned above in HPI.    EXAM:                                                      Physical Exam:   Vitals: BP 96/55 (BP Location: Right arm, Patient Position: Sitting, Cuff Size: Adult Regular)  Pulse 73  Temp 98.2  F (36.8  C) (Oral)  Resp 12  LMP 07/06/2016 (Approximate)  BMI= There is no height or weight on file to calculate BMI.  HEENT: Scleral erythema.  Neurologic:  MENTAL STATUS: Alert, attentive. Speech is fluent. Normal comprehension. Normal concentration. Adequate fund of knowledge. She appears a little slow at times.   CRANIAL NERVES: Visual fields intact to confrontation.  Pupils (Right 1 mm larger than Left), round and reactive to light. Facial sensation and movement normal. EOM full. Hearing intact to conversation. Trapezius strength intact. Palate elevates symmetrically. Tongue midline.  MOTOR: 5/5 in proximal and distal muscle groups of upper and lower extremities. Tone and bulk normal.   DTRs: Intact and symmetric.  SENSATION: Normal light touch throughout. Vibration is normal at the ankles.   COORDINATION: Normal fine motor movements. Finger tapping normal.   STATION AND GAIT:  Gait normal.   CV: RRR. S1, S2.   NECK: No bruits.    ASSESSMENT and PLAN:                                                      Assessment and Plan:    ICD-10-CM    1. Seizure disorder (H) G40.909 Keppra (Levetiracetam) Level     Carbamazepine and 10 11 epoxide     Lamotrigine Level     CBC with platelets     Comprehensive metabolic panel (BMP + Alb, Alk Phos, ALT, AST, Total. Bili, TP)   2. Encounter for long-term current use of medication Z79.899 Keppra (Levetiracetam) Level     Carbamazepine and 10 11 epoxide     Lamotrigine Level     CBC with platelets     Comprehensive metabolic panel (BMP + Alb, Alk Phos, ALT, AST, Total. Bili, TP)   3. Other generalized epilepsy, not intractable, without status epilepticus (H) G40.409 carBAMazepine (CARBATROL) 200 MG 12 hr capsule     lamoTRIgine (LAMICTAL) 100 MG tablet     levETIRAcetam 1000 MG TABS       Ms. Renee is a pleasant 56 yo woman with seizures since childhood. She has been doing well on triple AED therapy. No seizures for about 2 years now. We again discussed the high dose of Keppra and the likely safety of lowering the dose to the therapeutic range. I note that we could consider an increase in the carbamazepine if we were to lower the Keppra, if this eases her mind in terms of protection against seizures. The patient will think about making a change later this year when her responsibilities outside of the home decrease. We will check levels today  and basic labs. Plan is for 6-month follow-up. The patient understands and agrees with the plan.     Patient Instructions:  Labs today: medication levels, CBC, metabolic panel. We willnotify you of the results.   Continue current doses of the Keppra, Lamotrigine and Carbamazepine for now.  Let me know if you decide to lower the Keppra dose - I will advise you on the dosing again at that time.   Continue the Fioricet as needed for headaches.   Return to clinic in 6 months.    Total Time: 25 minutes were spent with the patient. More than 50% of the time spent on counseling (as described above in Assessment and Plan) /coordinating the care.    Shayy Mathias MD  Neurology                    Again, thank you for allowing me to participate in the care of your patient.        Sincerely,        Shayy Mathias MD

## 2018-03-30 LAB
LAMOTRIGINE SERPL-MCNC: 1.6 UG/ML (ref 2.5–15)
LEVETIRACETAM SERPL-MCNC: 44 UG/ML (ref 12–46)

## 2018-04-01 LAB
CARBAMAZEPINE EP SERPL-MCNC: 1 UG/ML
CARBAMAZEPINE SERPL-MCNC: 4.4 UG/ML

## 2018-04-03 NOTE — PROGRESS NOTES
Please advise Magaly RILEY Thelma,  1960, that her lamotrigine level is low for some reason, despite no change in her dose recently. I would not change the dose, however, because she is doing well clinically. The other anti-epileptic levels (levetiracetam and carbamazepine) are in therapeutic range. The rest of the labs are unremarkable. She has chronically low WBCs, which may be in part due to the Keppra. The count has actually improved though.  [phone] (home)   Shayy Mathias MD

## 2018-04-16 DIAGNOSIS — G43.009 MIGRAINE WITHOUT AURA AND WITHOUT STATUS MIGRAINOSUS, NOT INTRACTABLE: ICD-10-CM

## 2018-04-16 RX ORDER — BUTALBITAL, ACETAMINOPHEN, CAFFEINE AND CODEINE PHOSPHATE 50; 325; 40; 30 MG/1; MG/1; MG/1; MG/1
1 CAPSULE ORAL EVERY 6 HOURS PRN
Qty: 20 CAPSULE | Refills: 0 | Status: SHIPPED | OUTPATIENT
Start: 2018-04-16 | End: 2018-05-15

## 2018-04-16 NOTE — TELEPHONE ENCOUNTER
Fioricet/codeine last filled on 03-16-18 for qty of 20 with 0 refills.  Last office visit was on 03-29-18.  Next office visit with Dr. Mathias on 10-01-18.    Sofia Herman  [facility] RN

## 2018-05-15 DIAGNOSIS — G43.009 MIGRAINE WITHOUT AURA AND WITHOUT STATUS MIGRAINOSUS, NOT INTRACTABLE: ICD-10-CM

## 2018-05-15 RX ORDER — BUTALBITAL, ACETAMINOPHEN, CAFFEINE AND CODEINE PHOSPHATE 50; 325; 40; 30 MG/1; MG/1; MG/1; MG/1
1 CAPSULE ORAL EVERY 6 HOURS PRN
Qty: 20 CAPSULE | Refills: 0 | Status: SHIPPED | OUTPATIENT
Start: 2018-05-15 | End: 2018-07-05

## 2018-06-22 ENCOUNTER — TELEPHONE (OUTPATIENT)
Dept: NEUROLOGY | Facility: CLINIC | Age: 58
End: 2018-06-22

## 2018-06-22 DIAGNOSIS — Z79.899 ENCOUNTER FOR LONG-TERM (CURRENT) USE OF MEDICATIONS: Primary | ICD-10-CM

## 2018-06-22 DIAGNOSIS — G40.409 OTH GENERALIZED EPILEPSY, NOT INTRACTABLE, W/O STAT EPI (H): ICD-10-CM

## 2018-06-22 NOTE — TELEPHONE ENCOUNTER
Reason for Call:  Other     Detailed comments: Pt states she is ready to try lowering the dose of the generic Keppra as discussed at last visit - was told to call and go over dose adjustments - Please advise    Phone Number Patient can be reached at: Home number on file [phone] (home)    Best Time: Any    Can we leave a detailed message on this number? YES    Call taken on 6/22/2018 at 12:13 PM by Denise Behrendt

## 2018-06-25 DIAGNOSIS — Z13.6 CARDIOVASCULAR SCREENING; LDL GOAL LESS THAN 160: ICD-10-CM

## 2018-06-25 DIAGNOSIS — Z79.899 ENCOUNTER FOR LONG-TERM (CURRENT) USE OF MEDICATIONS: ICD-10-CM

## 2018-06-25 PROCEDURE — 80177 DRUG SCRN QUAN LEVETIRACETAM: CPT | Mod: 90 | Performed by: PHYSICIAN ASSISTANT

## 2018-06-25 PROCEDURE — 99000 SPECIMEN HANDLING OFFICE-LAB: CPT | Performed by: PHYSICIAN ASSISTANT

## 2018-06-25 PROCEDURE — 36415 COLL VENOUS BLD VENIPUNCTURE: CPT | Performed by: PHYSICIAN ASSISTANT

## 2018-06-25 NOTE — TELEPHONE ENCOUNTER
Ok, I would recommend we recheck a Keppra level so that I can be more precise on how to taper a bit. Is patient willing to come in for labs? Also, please confirm current dose is 3gm BID?  Order is in.   Thank you,  Shayy Mathias MD

## 2018-06-25 NOTE — TELEPHONE ENCOUNTER
Patient called to confirm she's taking 3000mg twice daily.  She will have her blood drawn in the next couple days and then will look forward to us calling her with instructions.    Nahomy THRASHER-CMA

## 2018-06-25 NOTE — TELEPHONE ENCOUNTER
I left a detailed message on Karen's identified voicemail, per her request.  I advised she could go to any [facility] and have her levels drawn.  I also asked that she call us back to let us know what dose she's currently on.  I told her when Dr. Mathias was able to review the results and current dosing, she would advise how best to taper.      Nahomy THRASHER-CMA

## 2018-06-26 LAB — LEVETIRACETAM SERPL-MCNC: 97 UG/ML (ref 12–46)

## 2018-06-26 RX ORDER — LEVETIRACETAM 500 MG/1
TABLET ORAL
Qty: 60 TABLET | Refills: 0 | Status: SHIPPED | OUTPATIENT
Start: 2018-06-26 | End: 2019-03-12

## 2018-06-26 NOTE — TELEPHONE ENCOUNTER
Keppra level came back HIGH at 97  Please advise Magaly to decrease the dose to 2500mg/3000mg for two weeks, then further down to 2500mg BID. Recheck level in one month.   CY

## 2018-06-26 NOTE — TELEPHONE ENCOUNTER
I advised Ms. Renee of Dr. Mathias's recommendations.  She verbalized understanding and also wrote the instructions for her own information.      She said she has a fair amount of the 1000mg Keppra tablets left, and they appear to be lightly scored.  She will start by splitting tablets, but asked that we also send in a prescription for the 500mg tablets of Keppra.  Prescription cued up.

## 2018-07-05 DIAGNOSIS — G43.009 MIGRAINE WITHOUT AURA AND WITHOUT STATUS MIGRAINOSUS, NOT INTRACTABLE: ICD-10-CM

## 2018-07-05 RX ORDER — BUTALBITAL, ACETAMINOPHEN, CAFFEINE AND CODEINE PHOSPHATE 50; 325; 40; 30 MG/1; MG/1; MG/1; MG/1
1 CAPSULE ORAL EVERY 6 HOURS PRN
Qty: 20 CAPSULE | Refills: 0 | Status: SHIPPED | OUTPATIENT
Start: 2018-07-05 | End: 2018-08-20

## 2018-07-05 NOTE — TELEPHONE ENCOUNTER
LV 3/29/18  Medication not on nurse protocol. Sent to provider for review.  Jessie Armstrong RN BSN PHN

## 2018-07-07 DIAGNOSIS — G43.009 MIGRAINE WITHOUT AURA AND WITHOUT STATUS MIGRAINOSUS, NOT INTRACTABLE: ICD-10-CM

## 2018-07-07 RX ORDER — BUTALBITAL, ACETAMINOPHEN, CAFFEINE AND CODEINE PHOSPHATE 50; 325; 40; 30 MG/1; MG/1; MG/1; MG/1
1 CAPSULE ORAL EVERY 6 HOURS PRN
Qty: 20 CAPSULE | Refills: 0 | Status: CANCELLED | OUTPATIENT
Start: 2018-07-07

## 2018-08-20 DIAGNOSIS — G43.009 MIGRAINE WITHOUT AURA AND WITHOUT STATUS MIGRAINOSUS, NOT INTRACTABLE: ICD-10-CM

## 2018-08-20 RX ORDER — BUTALBITAL, ACETAMINOPHEN, CAFFEINE AND CODEINE PHOSPHATE 50; 325; 40; 30 MG/1; MG/1; MG/1; MG/1
1 CAPSULE ORAL EVERY 6 HOURS PRN
Qty: 20 CAPSULE | Refills: 0 | Status: SHIPPED | OUTPATIENT
Start: 2018-08-20 | End: 2018-09-18

## 2018-08-20 NOTE — TELEPHONE ENCOUNTER
LOV 3/29/18- Medication not on nurse protocol. Sent to provider for review. Pt has follow up appt 10/2018.  Jessie Armstrong RN BSN PHN

## 2018-09-18 DIAGNOSIS — G43.009 MIGRAINE WITHOUT AURA AND WITHOUT STATUS MIGRAINOSUS, NOT INTRACTABLE: ICD-10-CM

## 2018-09-18 NOTE — TELEPHONE ENCOUNTER
Reason for Call:  Medication or medication refill:    Do you use a [facility]?  Name of the pharmacy and phone number for the current request:  [facility] - [phone]    Name of the medication requested: FIORICET WITH CODEINE    Other request: LOV:  3/29/18  LAST REFILL:  8/20/18    Can we leave a detailed message on this number? YES    Phone number patient can be reached at: Home number on file [phone] (home)     Best Time: any     Call taken on 9/18/2018 at 11:52 AM by Veronica Dainel

## 2018-09-24 DIAGNOSIS — G40.409 OTH GENERALIZED EPILEPSY, NOT INTRACTABLE, W/O STAT EPI (H): ICD-10-CM

## 2018-09-24 DIAGNOSIS — Z79.899 ENCOUNTER FOR LONG-TERM (CURRENT) USE OF MEDICATIONS: ICD-10-CM

## 2018-09-24 DIAGNOSIS — G40.409 OTHER GENERALIZED EPILEPSY, NOT INTRACTABLE, WITHOUT STATUS EPILEPTICUS (H): ICD-10-CM

## 2018-09-24 RX ORDER — LEVETIRACETAM 500 MG/1
TABLET ORAL
Qty: 60 TABLET | Refills: 0 | Status: CANCELLED | OUTPATIENT
Start: 2018-09-24

## 2018-09-24 RX ORDER — LEVETIRACETAM 1000 MG/1
3000 TABLET ORAL 2 TIMES DAILY
Qty: 180 TABLET | Refills: 0 | Status: SHIPPED | OUTPATIENT
Start: 2018-09-24 | End: 2019-01-08

## 2018-09-26 DIAGNOSIS — G43.009 MIGRAINE WITHOUT AURA AND WITHOUT STATUS MIGRAINOSUS, NOT INTRACTABLE: ICD-10-CM

## 2018-09-26 RX ORDER — BUTALBITAL, ACETAMINOPHEN, CAFFEINE AND CODEINE PHOSPHATE 50; 325; 40; 30 MG/1; MG/1; MG/1; MG/1
1 CAPSULE ORAL EVERY 6 HOURS PRN
Qty: 20 CAPSULE | Refills: 0 | Status: SHIPPED | OUTPATIENT
Start: 2018-09-26 | End: 2019-06-04

## 2018-09-26 RX ORDER — BUTALBITAL, ACETAMINOPHEN, CAFFEINE AND CODEINE PHOSPHATE 50; 325; 40; 30 MG/1; MG/1; MG/1; MG/1
1 CAPSULE ORAL EVERY 6 HOURS PRN
Qty: 20 CAPSULE | Refills: 0 | Status: CANCELLED | OUTPATIENT
Start: 2018-09-26

## 2018-09-26 NOTE — TELEPHONE ENCOUNTER
butalbital-APAP-caffeine-codeine (FIORICET WITH CODEINE) -65-30 MG per capsule    Date Last Filled: 08/20/18  QTY: 20    [facility]

## 2018-10-05 ENCOUNTER — TELEPHONE (OUTPATIENT)
Dept: NEUROLOGY | Facility: CLINIC | Age: 58
End: 2018-10-05

## 2018-10-05 NOTE — TELEPHONE ENCOUNTER
Fax received from [facility] [place]:     SCRIPT CLARIFICATION: Levetiracetam    Pharmacy Comments:  Insurance requires a 90-day supply.     Denise Behrendt  Specialty CSS

## 2018-10-05 NOTE — TELEPHONE ENCOUNTER
Essential medication= (pharmacy provided enough to last the weekend)    RN will forward to FP provider at [facility]-    [facility] KEPPRA LEVEL was   Component      Latest Ref Rng & Units 6/25/2018   Keppra (Levetiracetam) Level      12 - 46 ug/mL 97 (H)        Keppra level came back HIGH at 97  Please advise Magaly to decrease the dose to 2500mg/3000mg for two weeks, then further down to 2500mg BID. Recheck level in one month.   CY        Pt did not repeat labs and did not show up for follow up appt.  Dr Sands out of office until 15th.    Specialty RN unable to consider refill for another 90 day supply and would need to self pay for the provided 1 month from last month. (before she no showed for the appt.)    Tanya Corona RN  [facility]

## 2018-10-05 NOTE — TELEPHONE ENCOUNTER
Pt had been provided a refill to last until her scheduled follow up appt.  Pt then no showed for the appt.  Called and advised Pharmacy RN is unable to provide 90 day refill/ or further refill without provider consideration. And that pt has not rescheduled any appt showing intention to see provider.  Advised that Provider out of clinic until 15th.  Pharmacy confirmed that pt will need to pat out of pocket if fills any maint med for less than 90 day supply.    Suggested pt contact our clinic to schedule an appt if intends to follow up with Provider with an appt, or else may need to discuss with her PCP.  Tanya Corona RN  [facility]

## 2018-10-09 DIAGNOSIS — G40.909 EPILEPSY (H): Primary | ICD-10-CM

## 2018-10-09 RX ORDER — LEVETIRACETAM 1000 MG/1
TABLET ORAL
Qty: 540 TABLET | Refills: 0 | Status: SHIPPED | OUTPATIENT
Start: 2018-10-09 | End: 2019-01-03

## 2018-10-09 NOTE — TELEPHONE ENCOUNTER
90 day supply sent to pharmacy. Patient is scheduled to see provider this month. Valerie MORALES Rn

## 2018-10-09 NOTE — TELEPHONE ENCOUNTER
Anthony calling from [facility] stating pt is out of her keppra and ins will not refill it unless it is a 90 day supply. Pt is scheduled to be seen on 10/25. Please send in refill for 90 day supply.     Please call    Veronica Daniel  Specialty CSS

## 2018-10-25 ENCOUNTER — OFFICE VISIT (OUTPATIENT)
Dept: NEUROLOGY | Facility: CLINIC | Age: 58
End: 2018-10-25
Payer: COMMERCIAL

## 2018-10-25 VITALS
SYSTOLIC BLOOD PRESSURE: 93 MMHG | TEMPERATURE: 96.1 F | DIASTOLIC BLOOD PRESSURE: 61 MMHG | RESPIRATION RATE: 12 BRPM | BODY MASS INDEX: 23.54 KG/M2 | HEART RATE: 76 BPM | WEIGHT: 149.2 LBS

## 2018-10-25 DIAGNOSIS — G43.009 MIGRAINE WITHOUT AURA AND WITHOUT STATUS MIGRAINOSUS, NOT INTRACTABLE: ICD-10-CM

## 2018-10-25 DIAGNOSIS — G40.909 SEIZURE DISORDER (H): Primary | ICD-10-CM

## 2018-10-25 DIAGNOSIS — Z79.899 ENCOUNTER FOR LONG-TERM (CURRENT) USE OF MEDICATIONS: ICD-10-CM

## 2018-10-25 LAB
ALBUMIN SERPL-MCNC: 4.2 G/DL (ref 3.4–5)
ALP SERPL-CCNC: 91 U/L (ref 40–150)
ALT SERPL W P-5'-P-CCNC: 39 U/L (ref 0–50)
ANION GAP SERPL CALCULATED.3IONS-SCNC: 7 MMOL/L (ref 3–14)
AST SERPL W P-5'-P-CCNC: 25 U/L (ref 0–45)
BILIRUB SERPL-MCNC: 0.3 MG/DL (ref 0.2–1.3)
BUN SERPL-MCNC: 25 MG/DL (ref 7–30)
CALCIUM SERPL-MCNC: 9.3 MG/DL (ref 8.5–10.1)
CHLORIDE SERPL-SCNC: 103 MMOL/L (ref 94–109)
CO2 SERPL-SCNC: 28 MMOL/L (ref 20–32)
CREAT SERPL-MCNC: 0.75 MG/DL (ref 0.52–1.04)
GFR SERPL CREATININE-BSD FRML MDRD: 79 ML/MIN/1.7M2
GLUCOSE SERPL-MCNC: 91 MG/DL (ref 70–99)
POTASSIUM SERPL-SCNC: 4.1 MMOL/L (ref 3.4–5.3)
PROT SERPL-MCNC: 8 G/DL (ref 6.8–8.8)
SODIUM SERPL-SCNC: 138 MMOL/L (ref 133–144)

## 2018-10-25 PROCEDURE — 80339 ANTIEPILEPTICS NOS 1-3: CPT | Mod: 90 | Performed by: PSYCHIATRY & NEUROLOGY

## 2018-10-25 PROCEDURE — 36415 COLL VENOUS BLD VENIPUNCTURE: CPT | Performed by: PSYCHIATRY & NEUROLOGY

## 2018-10-25 PROCEDURE — 80177 DRUG SCRN QUAN LEVETIRACETAM: CPT | Mod: 90 | Performed by: PSYCHIATRY & NEUROLOGY

## 2018-10-25 PROCEDURE — 80156 ASSAY CARBAMAZEPINE TOTAL: CPT | Mod: 90 | Performed by: PSYCHIATRY & NEUROLOGY

## 2018-10-25 PROCEDURE — 99215 OFFICE O/P EST HI 40 MIN: CPT | Performed by: PSYCHIATRY & NEUROLOGY

## 2018-10-25 PROCEDURE — 80175 DRUG SCREEN QUAN LAMOTRIGINE: CPT | Mod: 90 | Performed by: PSYCHIATRY & NEUROLOGY

## 2018-10-25 PROCEDURE — 99000 SPECIMEN HANDLING OFFICE-LAB: CPT | Performed by: PSYCHIATRY & NEUROLOGY

## 2018-10-25 PROCEDURE — 80053 COMPREHEN METABOLIC PANEL: CPT | Performed by: PSYCHIATRY & NEUROLOGY

## 2018-10-25 RX ORDER — BUTALBITAL, ACETAMINOPHEN, CAFFEINE AND CODEINE PHOSPHATE 50; 325; 40; 30 MG/1; MG/1; MG/1; MG/1
1 CAPSULE ORAL EVERY 6 HOURS PRN
Qty: 20 CAPSULE | Refills: 0 | Status: CANCELLED | OUTPATIENT
Start: 2018-10-25

## 2018-10-25 RX ORDER — BUTALBITAL, ACETAMINOPHEN AND CAFFEINE 50; 325; 40 MG/1; MG/1; MG/1
1 TABLET ORAL EVERY 4 HOURS PRN
Qty: 12 TABLET | Refills: 1 | Status: SHIPPED | OUTPATIENT
Start: 2018-10-25 | End: 2019-01-24

## 2018-10-25 ASSESSMENT — PAIN SCALES - GENERAL: PAINLEVEL: NO PAIN (0)

## 2018-10-25 NOTE — PATIENT INSTRUCTIONS
Plan:    Referral to Epileptologist for a second opinion about your medication regimen. I appreciate your willingness to meet with them.   For now, we will continue the current regimen: Carbatrol: 400mg(am)/600mg(pm), Lamictal 100mg(am)/300mg(pm) and Keppra 3000mg twice daily.   We will check medication levels today and notify you of the results.   Please notify my office if you have another seizure.  The DMV rule for loss of awareness/consciousness is that you have to refrain from driving for 3 months after the event.   As we discussed, limit the Fioricet to not more than 2 days per week, on average. Will fill for #12.

## 2018-10-25 NOTE — PROGRESS NOTES
"ESTABLISHED PATIENT NEUROLOGY NOTE    DATE OF VISIT: 10/25/2018  MRN: [id number]  PATIENT NAME: Magaly Renee  YOB: 1960    Chief Complaint   Patient presents with     RECHECK     Seizure     SUBJECTIVE:                                                      HISTORY OF PRESENT ILLNESS:  Magaly is here for follow up regarding seizures. The patient is on carbamazepine 400mg/600mg. Keppra 2500mg BID (a decrease from prior dose) and lamotrigine 100mg/300mg. When we met about 6 months ago, she reported that she continued to remain seizure free. Her last seizure had been a GTC in early 2016.   She takes Fioricet for migraine headaches, if aspirin is not helpful. Keppra level was 97 prior to the decreased down from 3gm BID dosing.     EEGs have shown left-central focus and bifrontal epileptiform activity. She has been felt to have generalized epilepsy based on history and evaluations by an epileptologist in the past.    Today the patient reminds me that she tried to lower the dose of the Keppra during the summer. She says she dropped the dose to 2500mg/3000mg as far as she can remember. She did not come in for a Keppra level with the dose change, as I had requested.     She says she had a seizure in September. She says she did not lose consciousness. She had some confusion, was talking nonsense and was \"out of it\" with the event. She does not remember the event but insists that she did not lose consciousness. That being said, she says her son had to describe the event to her. She did not seek medical attention. AED levels were not checked.   She was normal when she awoke in the morning.      She says that this was typical of her smaller seizures in the past. She had not missed any of her AED doses and was not ill or sleep-deprived prior to the event. She says that she increased the medication back to the 3000mg BID after the event and has felt fine since.     She says that she has been driving since the " event. 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     She is insistent that she does not want to go back to see an Epileptologist. She starts to cry and says she cannot drive that far but endorses that her  could take her, She is also upset that thy might want to do an electroencephalogram because it messes up her hair.     Regarding the headaches, she says that lately she has been using the Fioricet before trying aspirin at times. However, she says she does not use the Fioricet every week, just some weeks and maybe 2-3. I explain that I am getting refill requests monthly and the count has been #20. She shrugs and says then that is how many she is using. I note the formulation she has been on has codeine in it, which I had not noticed in the recent past.     CURRENT MEDICATIONS:     Current Outpatient Prescriptions on File Prior to Visit:  aspirin 325 MG tablet Take by mouth daily as needed for moderate pain   butalbital-APAP-caffeine-codeine (FIORICET WITH CODEINE) -35-30 MG per capsule Take 1 capsule by mouth every 6 hours as needed for headaches (do not use more than 3 times per week) To be used as rescue medication if other meds do not work.   carBAMazepine (CARBATROL) 200 MG 12 hr capsule TAKE 2 CAPS IN AM AND 3 CAPS IN PM.   cetirizine (ZYRTEC) 10 MG tablet Take 1 tablet (10 mg) by mouth every morning (Needs follow-up appointment for this medication)   fluticasone (FLONASE) 50 MCG/ACT nasal spray Spray 2 sprays into both nostrils daily (Patient taking differently: Spray 2 sprays into both nostrils daily as needed )   lamoTRIgine (LAMICTAL) 100 MG tablet Take 2 tablets (200 mg) by mouth 2 times daily (or as directed)   levETIRAcetam (KEPPRA) 500 MG tablet Take with 1000mg tablets for following taper:2500mg in the morning and 3000mg in the evening for 2 weeks, [id number] twice daily for two weeks, then recheck labs. (Patient taking differently: Take 1,500 mg by mouth 2  times daily Take with 1000mg tablets for following taper:  2500mg in the morning and 3000mg in the evening for 2 weeks, then  2500mg twice daily for two weeks, then recheck labs.)   olopatadine (PATANOL) 0.1 % ophthalmic solution INSTILL 1 DROP INTO BOTH EYES TWO TIMES A DAY AS NEEDED   levETIRAcetam 1000 MG TABS Take 3 tablets (3000mg) two times daily. (Patient not taking: Reported on 10/25/2018)   levETIRAcetam 1000 MG TABS Take 3,000 mg by mouth 2 times daily (Patient not taking: Reported on 10/25/2018)     No current facility-administered medications on file prior to visit.     RECENT DIAGNOSTIC STUDIES:   Labs:   Results for orders placed or performed in visit on 18   Keppra (Levetiracetam) Level   Result Value Ref Range    Keppra (Levetiracetam) Level 97 (H) 12 - 46 ug/mL       REVIEW OF SYSTEMS:                                                      10-point review of systems es negative except as mentioned above in HPI.     EXAM:                                                      Physical Exam:   Vitals: BP 93/61 (BP Location: Right arm, Patient Position: Sitting, Cuff Size: Adult Regular)  Pulse 76  Temp 96.1  F (35.6  C) (Tympanic)  Resp 12  Wt 67.7 kg (149 lb 3.2 oz)  LMP 2016 (Approximate)  BMI 23.54 kg/m2  BMI= Body mass index is 23.54 kg/(m^2).  HEENT: Scleral erythema. Tearful at times.   Focused Neurologic:  MENTAL STATUS: Alert, attentive. Speech is fluent. Normal comprehension. Normal concentration. Adequate fund of knowledge. She appears a little slow. Mini-Co/5 (missed one word on recall).  CRANIAL NERVES: Visual fields intact to confrontation. Pupils (Right 1 mm larger than Left), round and reactive to light. Facial sensation and movement normal. EOM full. Hearing intact to conversation. Trapezius strength intact. Palate elevates symmetrically. Tongue midline.  MOTOR: 5/5 in proximal and distal muscle groups of upper and lower extremities. Tone and bulk normal.   SENSATION:  Normal light touch throughout.   COORDINATION: Normal fine motor movements. Finger tapping normal.   STATION AND GAIT: Romberg negative. Casual gait normal.   CV: RRR. S1, S2.   NECK: No bruits.    ASSESSMENT and PLAN:                                                      Assessment and Plan:    ICD-10-CM    1. Seizure disorder (H) G40.909 NEUROLOGY ADULT REFERRAL     Keppra (Levetiracetam) Level     Lamotrigine Level     Carbamazepine and 10 11 epoxide     Comprehensive metabolic panel   2. Migraine without aura and without status migrainosus, not intractable G43.009 butalbital-acetaminophen-caffeine (FIORICET/ESGIC) -40 MG per tablet   3. Encounter for long-term (current) use of medications Z79.899 Keppra (Levetiracetam) Level     Lamotrigine Level     Carbamazepine and 10 11 epoxide     Comprehensive metabolic panel       Ms. Renee is a 59 yo woman with seizures since childhood. She has been on triple AED therapy, and recently had a breakthrough seizure after a slight decrease in the dose of her (previously-supratherapeutic) Keppra. I am concerned about this given that she made little change in the dose and there was no level checked after the dose change. I suspect that even on the 2500mg/3000mg she says she took, her level still would have been well above the therapeutic range at the time of the event. I explained to the patient that it is unusual to require such high doses of Keppra and that I think it is time to talk to an epileptologist again to help us adjust to a more-appropriate regimen. The patient was very resistant to this. She finally acquiesced and I truly hope she follows through.     For now we will continue the current AED regimen with high-dose Keppra, lamotrigine and carbamazepine.     Regarding the headaches, I am not sure if the patient is not being forthright or if she is simply confused, but there was discrepancy between her report about current Fioricet use and the number of  pills/refills she has been calling for over the past few months. I will drop down to 12 tablets, since she says she doesn't use more than 3 per week and not even as often as every week. I also switched the prescription to the formulation without codeine. I am concerned about cognitive effects from the codeine given her confusion today.     We discussed seizure safety, including not driving for 3 months after an event of altered consciousness. I encouraged the patient to adhere to this rule for her safety and the safety of others.     I would be happy to help implement the recommendations of the Epileptologist if Magaly decides to follow here as well. If that is the case, I would like to see her back in about 3-4 months.     Patient Instructions:  Referral to Epileptologist for a second opinion about your medication regimen. I appreciate your willingness to meet with them.   For now, we will continue the current regimen: Carbatrol: 400mg(am)/600mg(pm), Lamictal 100mg(am)/300mg(pm) and Keppra 3000mg twice daily.   We will check medication levels today and notify you of the results.   Please notify my office if you have another seizure.  The DMV rule for loss of awareness/consciousness is that you have to refrain from driving for 3 months after the event.   As we discussed, limit the Fioricet to not more than 2 days per week, on average. Will fill for #12.    Total Time: >40 minutes were spent with the patient. More than 50% of the time spent on counseling (as described above in Assessment and Plan) /coordinating the care.    Shayy Sands MD  Neurology

## 2018-10-25 NOTE — MR AVS SNAPSHOT
After Visit Summary   10/25/2018    Magaly Renee    MRN: [id number]           Patient Information     Date Of Birth          1960        Visit Information        Provider Department      10/25/2018 11:00 AM Shayy Cali MD [facility]        Today's Diagnoses     Seizure disorder (H)    -  1    Migraine without aura and without status migrainosus, not intractable        Encounter for long-term (current) use of medications          Care Instructions    Plan:    Referral to Epileptologist for a second opinion about your medication regimen. I appreciate your willingness to meet with them.   For now, we will continue the current regimen: Carbatrol: 400mg (am)/600mg (pm), Lamictal 200mg twice daily and Keppra 3000mg twice daily.   We will check medication levels today and notify you of the results.   Please notify my office if you have another seizure.  The DMV rule for loss of awareness/consciousness is that you have to refrain from driving for 3 months after the event.   As we discussed, limit the Fioricet to not more than 2 days per week, on average. Will fill for #12.            Follow-ups after your visit        Additional Services     NEUROLOGY ADULT REFERRAL       Your provider has referred you for the following:   Consult at [facility]: Neurology Clinic [facility] [phone]   [email or web address]  Epilepsy    Please be aware that coverage of these services is subject to the terms and limitations of your health insurance plan.  Call member services at your health plan with any benefit or coverage questions.      Please bring the following with you to your appointment:    (1) Any X-Rays, CTs or MRIs which have been performed.  Contact the facility where they were done to arrange for  prior to your scheduled appointment.    (2) List of current medications  (3) This referral request   (4) Any documents/labs given to you  "for this referral                  Who to contact     If you have questions or need follow up information about today's clinic visit or your schedule please contact [facility] directly at [phone].  Normal or non-critical lab and imaging results will be communicated to you by MyChart, letter or phone within 4 business days after the clinic has received the results. If you do not hear from us within 7 days, please contact the clinic through MyChart or phone. If you have a critical or abnormal lab result, we will notify you by phone as soon as possible.  Submit refill requests through [employer] or call your pharmacy and they will forward the refill request to us. Please allow 3 business days for your refill to be completed.          Additional Information About Your Visit        [facility] Information     [employer] lets you send messages to your doctor, view your test results, renew your prescriptions, schedule appointments and more. To sign up, go to [email or web address] . Click on \"Log in\" on the left side of the screen, which will take you to the Welcome page. Then click on \"Sign up Now\" on the right side of the page.     You will be asked to enter the access code listed below, as well as some personal information. Please follow the directions to create your username and password.     Your access code is: [email or web address]  Expires: 2019 11:51 AM     Your access code will  in 90 days. If you need help or a new code, please call your [place] clinic or [phone].        Care EveryWhere ID     This is your Care EveryWhere ID. This could be used by other organizations to access your [place] medical records  [id number]        Your Vitals Were     Pulse Temperature Respirations Last Period BMI (Body Mass Index)       76 96.1  F (35.6  C) (Tympanic) 12 2016 (Approximate) 23.54 kg/m2        Blood Pressure from Last 3 Encounters:   10/25/18 93/61   18 96/55   17 107/71    Weight " from Last 3 Encounters:   10/25/18 67.7 kg (149 lb 3.2 oz)   09/18/17 68.6 kg (151 lb 3.2 oz)   05/04/17 68 kg (150 lb)              We Performed the Following     Carbamazepine and 10 11 epoxide     Keppra (Levetiracetam) Level     Lamotrigine Level     NEUROLOGY ADULT REFERRAL          Today's Medication Changes          These changes are accurate as of 10/25/18 12:00 PM.  If you have any questions, ask your nurse or doctor.               Start taking these medicines.        Dose/Directions    butalbital-acetaminophen-caffeine -40 MG per tablet   Commonly known as:  FIORICET/ESGIC   Used for:  Migraine without aura and without status migrainosus, not intractable   Started by:  Shayy Cali MD        Dose:  1 tablet   Take 1 tablet by mouth every 4 hours as needed for headaches or moderate to severe pain   Quantity:  12 tablet   Refills:  1         These medicines have changed or have updated prescriptions.        Dose/Directions    fluticasone 50 MCG/ACT spray   Commonly known as:  FLONASE   This may have changed:    - when to take this  - reasons to take this   Used for:  ETD (eustachian tube dysfunction)        Dose:  2 spray   Spray 2 sprays into both nostrils daily   Quantity:  1 Package   Refills:  1       * levETIRAcetam 500 MG tablet   Commonly known as:  KEPPRA   This may have changed:    - how much to take  - how to take this  - when to take this  - additional instructions   Used for:  Encounter for long-term (current) use of medications, Oth generalized epilepsy, not intractable, w/o stat epi (H)        Take with 1000mg tablets for following taper: 2500mg in the morning and 3000mg in the evening for 2 weeks, then 2500mg twice daily for two weeks, then recheck labs.   Quantity:  60 tablet   Refills:  0       * levETIRAcetam 1000 MG Tabs   This may have changed:  Another medication with the same name was changed. Make sure you understand how and when to take each.   Used for:  Other  generalized epilepsy, not intractable, without status epilepticus (H)        Dose:  3000 mg   Take 3,000 mg by mouth 2 times daily   Quantity:  180 tablet   Refills:  0       * levETIRAcetam 1000 MG Tabs   This may have changed:  Another medication with the same name was changed. Make sure you understand how and when to take each.   Used for:  Epilepsy (H)        Take 3 tablets (3000mg) two times daily.   Quantity:  540 tablet   Refills:  0       * Notice:  This list has 3 medication(s) that are the same as other medications prescribed for you. Read the directions carefully, and ask your doctor or other care provider to review them with you.         Where to get your medicines      Some of these will need a paper prescription and others can be bought over the counter.  Ask your nurse if you have questions.     Bring a paper prescription for each of these medications     butalbital-acetaminophen-caffeine -40 MG per tablet                Primary Care Provider Office Phone # Fax #    [facility] [phone] [address] [facility] [address]        Equal Access to Services     [facility] : Hadii jayla harry hadasho Soomaali, waaxda luqadaha, qaybta kaalmada adeegyada, waxay kayodein cordelia braun. So [facility] [phone].    ATENCIÓN: Si habla español, tiene a mack disposición servicios gratuitos de asistencia lingüística. Llame al [phone].    We comply with applicable federal civil rights laws and [place] laws. We do not discriminate on the basis of race, color, national origin, age, disability, sex, sexual orientation, or gender identity.            Thank you!     Thank you for choosing [facility]  for your care. Our goal is always to provide you with excellent care. Hearing back from our patients is one way we can continue to improve our services. Please take a few minutes to complete the written survey that you may receive in the mail after your visit  with us. Thank you!             Your Updated Medication List - Protect others around you: Learn how to safely use, store and throw away your medicines at www.disposemymeds.org.          This list is accurate as of 10/25/18 12:00 PM.  Always use your most recent med list.                   Brand Name Dispense Instructions for use Diagnosis    aspirin 325 MG tablet      Take by mouth daily as needed for moderate pain        butalbital-acetaminophen-caffeine -40 MG per tablet    FIORICET/ESGIC    12 tablet    Take 1 tablet by mouth every 4 hours as needed for headaches or moderate to severe pain    Migraine without aura and without status migrainosus, not intractable       butalbital-APAP-caffeine-codeine -06-30 MG per capsule    FIORICET WITH codeine    20 capsule    Take 1 capsule by mouth every 6 hours as needed for headaches (do not use more than 3 times per week) To be used as rescue medication if other meds do not work.    Migraine without aura and without status migrainosus, not intractable       carBAMazepine 200 MG 12 hr capsule    CARBATROL    450 capsule    TAKE 2 CAPS IN AM AND 3 CAPS IN PM.    Other generalized epilepsy, not intractable, without status epilepticus (H)       cetirizine 10 MG tablet    zyrTEC    30 tablet    Take 1 tablet (10 mg) by mouth every morning (Needs follow-up appointment for this medication)    Allergic conjunctivitis, bilateral, Seasonal allergic rhinitis       fluticasone 50 MCG/ACT spray    FLONASE    1 Package    Spray 2 sprays into both nostrils daily    ETD (eustachian tube dysfunction)       lamoTRIgine 100 MG tablet    LaMICtal    360 tablet    Take 2 tablets (200 mg) by mouth 2 times daily (or as directed)    Other generalized epilepsy, not intractable, without status epilepticus (H)       * levETIRAcetam 500 MG tablet    KEPPRA    60 tablet    Take with 1000mg tablets for following taper: 2500mg in the morning and 3000mg in the evening for 2 weeks, then 2500mg  twice daily for two weeks, then recheck labs.    Encounter for long-term (current) use of medications, Oth generalized epilepsy, not intractable, w/o stat epi (H)       * levETIRAcetam 1000 MG Tabs     180 tablet    Take 3,000 mg by mouth 2 times daily    Other generalized epilepsy, not intractable, without status epilepticus (H)       * levETIRAcetam 1000 MG Tabs     540 tablet    Take 3 tablets (3000mg) two times daily.    Epilepsy (H)       olopatadine 0.1 % ophthalmic solution    PATANOL    5 mL    INSTILL 1 DROP INTO BOTH EYES TWO TIMES A DAY AS NEEDED    Chronic allergic conjunctivitis       * Notice:  This list has 3 medication(s) that are the same as other medications prescribed for you. Read the directions carefully, and ask your doctor or other care provider to review them with you.

## 2018-10-25 NOTE — LETTER
"    10/25/2018         RE: Magaly Renee  [address] [place] [address]        Dear Colleague,    Thank you for referring your patient, Magaly Renee, to the [facility]. Please see a copy of my visit note below.    ESTABLISHED PATIENT NEUROLOGY NOTE    DATE OF VISIT: 10/25/2018  MRN: [id number]  PATIENT NAME: Magaly Renee  YOB: 1960    Chief Complaint   Patient presents with     RECHECK     Seizure     SUBJECTIVE:                                                      HISTORY OF PRESENT ILLNESS:  Magaly is here for follow up regarding seizures. The patient is on carbamazepine 400mg/600mg. Keppra 2500mg BID (a decrease from prior dose) and lamotrigine 100mg/300mg. When we met about 6 months ago, she reported that she continued to remain seizure free. Her last seizure had been a GTC in early 2016.   She takes Fioricet for migraine headaches, if aspirin is not helpful. Keppra level was 97 prior to the decreased down from 3gm BID dosing.     EEGs have shown left-central focus and bifrontal epileptiform activity. She has been felt to have generalized epilepsy based on history and evaluations by an epileptologist in the past.    Today the patient reminds me that she tried to lower the dose of the Keppra during the summer. She says she dropped the dose to 2500mg/3000mg as far as she can remember. She did not come in for a Keppra level with the dose change, as I had requested.     She says she had a seizure in September. She says she did not lose consciousness. She had some confusion, was talking nonsense and was \"out of it\" with the event. She does not remember the event but insists that she did not lose consciousness. That being said, she says her son had to describe the event to her. She did not seek medical attention. AED levels were not checked.   She was normal when she awoke in the morning.      She says that this was typical of her smaller seizures in the past. She " had not missed any of her AED doses and was not ill or sleep-deprived prior to the event. She says that she increased the medication back to the 3000mg BID after the event and has felt fine since.     She says that she has been driving since the event. 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     She is insistent that she does not want to go back to see an Epileptologist. She starts to cry and says she cannot drive that far but endorses that her  could take her, She is also upset that thy might want to do an electroencephalogram because it messes up her hair.     Regarding the headaches, she says that lately she has been using the Fioricet before trying aspirin at times. However, she says she does not use the Fioricet every week, just some weeks and maybe 2-3. I explain that I am getting refill requests monthly and the count has been #20. She shrugs and says then that is how many she is using. I note the formulation she has been on has codeine in it, which I had not noticed in the recent past.     CURRENT MEDICATIONS:     Current Outpatient Prescriptions on File Prior to Visit:  aspirin 325 MG tablet Take by mouth daily as needed for moderate pain   butalbital-APAP-caffeine-codeine (FIORICET WITH CODEINE) -06-30 MG per capsule Take 1 capsule by mouth every 6 hours as needed for headaches (do not use more than 3 times per week) To be used as rescue medication if other meds do not work.   carBAMazepine (CARBATROL) 200 MG 12 hr capsule TAKE 2 CAPS IN AM AND 3 CAPS IN PM.   cetirizine (ZYRTEC) 10 MG tablet Take 1 tablet (10 mg) by mouth every morning (Needs follow-up appointment for this medication)   fluticasone (FLONASE) 50 MCG/ACT nasal spray Spray 2 sprays into both nostrils daily (Patient taking differently: Spray 2 sprays into both nostrils daily as needed )   lamoTRIgine (LAMICTAL) 100 MG tablet Take 2 tablets (200 mg) by mouth 2 times daily (or as directed)    levETIRAcetam (KEPPRA) 500 MG tablet Take with 1000mg tablets for following taper:2500mg in the morning and 3000mg in the evening for 2 weeks, [id number] twice daily for two weeks, then recheck labs. (Patient taking differently: Take 1,500 mg by mouth 2 times daily Take with 1000mg tablets for following taper:  2500mg in the morning and 3000mg in the evening for 2 weeks, then  2500mg twice daily for two weeks, then recheck labs.)   olopatadine (PATANOL) 0.1 % ophthalmic solution INSTILL 1 DROP INTO BOTH EYES TWO TIMES A DAY AS NEEDED   levETIRAcetam 1000 MG TABS Take 3 tablets (3000mg) two times daily. (Patient not taking: Reported on 10/25/2018)   levETIRAcetam 1000 MG TABS Take 3,000 mg by mouth 2 times daily (Patient not taking: Reported on 10/25/2018)     No current facility-administered medications on file prior to visit.     RECENT DIAGNOSTIC STUDIES:   Labs:   Results for orders placed or performed in visit on 18   Keppra (Levetiracetam) Level   Result Value Ref Range    Keppra (Levetiracetam) Level 97 (H) 12 - 46 ug/mL       REVIEW OF SYSTEMS:                                                      10-point review of systems es negative except as mentioned above in HPI.     EXAM:                                                      Physical Exam:   Vitals: BP 93/61 (BP Location: Right arm, Patient Position: Sitting, Cuff Size: Adult Regular)  Pulse 76  Temp 96.1  F (35.6  C) (Tympanic)  Resp 12  Wt 67.7 kg (149 lb 3.2 oz)  LMP 2016 (Approximate)  BMI 23.54 kg/m2  BMI= Body mass index is 23.54 kg/(m^2).  HEENT: Scleral erythema. Tearful at times.   Focused Neurologic:  MENTAL STATUS: Alert, attentive. Speech is fluent. Normal comprehension. Normal concentration. Adequate fund of knowledge. She appears a little slow. Mini-Co/5 (missed one word on recall).  CRANIAL NERVES: Visual fields intact to confrontation. Pupils (Right 1 mm larger than Left), round and reactive to light. Facial  sensation and movement normal. EOM full. Hearing intact to conversation. Trapezius strength intact. Palate elevates symmetrically. Tongue midline.  MOTOR: 5/5 in proximal and distal muscle groups of upper and lower extremities. Tone and bulk normal.   SENSATION: Normal light touch throughout.   COORDINATION: Normal fine motor movements. Finger tapping normal.   STATION AND GAIT: Romberg negative. Casual gait normal.   CV: RRR. S1, S2.   NECK: No bruits.    ASSESSMENT and PLAN:                                                      Assessment and Plan:    ICD-10-CM    1. Seizure disorder (H) G40.909 NEUROLOGY ADULT REFERRAL     Keppra (Levetiracetam) Level     Lamotrigine Level     Carbamazepine and 10 11 epoxide     Comprehensive metabolic panel   2. Migraine without aura and without status migrainosus, not intractable G43.009 butalbital-acetaminophen-caffeine (FIORICET/ESGIC) -40 MG per tablet   3. Encounter for long-term (current) use of medications Z79.899 Keppra (Levetiracetam) Level     Lamotrigine Level     Carbamazepine and 10 11 epoxide     Comprehensive metabolic panel       Ms. Renee is a 59 yo woman with seizures since childhood. She has been on triple AED therapy, and recently had a breakthrough seizure after a slight decrease in the dose of her (previously-supratherapeutic) Keppra. I am concerned about this given that she made little change in the dose and there was no level checked after the dose change. I suspect that even on the 2500mg/3000mg she says she took, her level still would have been well above the therapeutic range at the time of the event. I explained to the patient that it is unusual to require such high doses of Keppra and that I think it is time to talk to an epileptologist again to help us adjust to a more-appropriate regimen. The patient was very resistant to this. She finally acquiesced and I truly hope she follows through.     For now we will continue the current AED regimen  with high-dose Keppra, lamotrigine and carbamazepine.     Regarding the headaches, I am not sure if the patient is not being forthright or if she is simply confused, but there was discrepancy between her report about current Fioricet use and the number of pills/refills she has been calling for over the past few months. I will drop down to 12 tablets, since she says she doesn't use more than 3 per week and not even as often as every week. I also switched the prescription to the formulation without codeine. I am concerned about cognitive effects from the codeine given her confusion today.     We discussed seizure safety, including not driving for 3 months after an event of altered consciousness. I encouraged the patient to adhere to this rule for her safety and the safety of others.     I would be happy to help implement the recommendations of the Epileptologist if Magaly decides to follow here as well. If that is the case, I would like to see her back in about 3-4 months.     Patient Instructions:  Referral to Epileptologist for a second opinion about your medication regimen. I appreciate your willingness to meet with them.   For now, we will continue the current regimen: Carbatrol: 400mg(am)/600mg(pm), Lamictal 100mg(am)/300mg(pm) and Keppra 3000mg twice daily.   We will check medication levels today and notify you of the results.   Please notify my office if you have another seizure.  The DMV rule for loss of awareness/consciousness is that you have to refrain from driving for 3 months after the event.   As we discussed, limit the Fioricet to not more than 2 days per week, on average. Will fill for #12.    Total Time: >40 minutes were spent with the patient. More than 50% of the time spent on counseling (as described above in Assessment and Plan) /coordinating the care.    Shayy Sands MD  Neurology                    Again, thank you for allowing me to participate in the care of your patient.         Sincerely,        Shayy Sands MD

## 2018-10-26 LAB
CARBAMAZEPINE EP SERPL-MCNC: 1 UG/ML
CARBAMAZEPINE SERPL-MCNC: 4 UG/ML
LAMOTRIGINE SERPL-MCNC: 2 UG/ML (ref 2.5–15)
LEVETIRACETAM SERPL-MCNC: 55 UG/ML (ref 12–46)

## 2018-10-29 ENCOUNTER — TELEPHONE (OUTPATIENT)
Dept: NEUROLOGY | Facility: CLINIC | Age: 58
End: 2018-10-29

## 2018-10-29 DIAGNOSIS — G40.909 SEIZURE DISORDER (H): Primary | ICD-10-CM

## 2018-10-29 DIAGNOSIS — Z79.899 ENCOUNTER FOR LONG-TERM (CURRENT) USE OF MEDICATIONS: ICD-10-CM

## 2018-10-29 NOTE — TELEPHONE ENCOUNTER
Karen was advised of her AED levels of 10/25/18 and would like to stay at her current lamotrigine dose.  She and her  are actively working to find an epileptologist, as suggested, but haven't scheduled with one yet ( has to drive her and is frequently out of the country).      She's wondering when you'd like her to have her levels checked again (if she doesn't get in to see one quickly).

## 2018-10-29 NOTE — PROGRESS NOTES
Please advise Magaly Renee,  1960, that her Keppra level is high (but not terribly so) and the lamotrigine level is low. We have two options: continue current doses until she is seen in Epilepsy clinic, or increase the lamotrigine dose. The rest pf the labs were fine. Please let me know what the patient decides.   [phone] (home)   Shayy Sands

## 2018-10-30 NOTE — TELEPHONE ENCOUNTER
Recheck labs in 3-6 months, but I suggest she make an appointment in Epilepsy clinic before then.     JENNIFER

## 2018-10-30 NOTE — TELEPHONE ENCOUNTER
Sounds like a plan. I had asked the patient about referral to a group other than [facility], but when we met [facility], so she must have changed her mind.  Orders signed.     JENNIFER

## 2018-10-30 NOTE — TELEPHONE ENCOUNTER
Notified by phone.  Orders entered, will route to provider for signature.    Karen states she's having trouble finding an epileptologist (doesn't want to use [facility]).  I told her she may want to try calling some of the neurologists in our network-- [facility], [facility], [facility]-- to ask if they have an epileptologist on site or if they have one to which they refer.  She agreed and will continue to work at getting scheduled.

## 2018-11-29 NOTE — TELEPHONE ENCOUNTER
FUTURE VISIT INFORMATION      FUTURE VISIT INFORMATION:    Date: 12/11/18    Time: 1p    Location: [facility]  REFERRAL INFORMATION:    Referring provider:  Dr. Jaylin Sands    Referring providers clinic:  [facility]    Reason for visit/diagnosis  2nd opinion Seizures    RECORDS REQUESTED FROM:       Clinic name Comments Records Status Imaging Status   [facility] Dr. Jaylin Sands 10/25/18 OV  Dr. Camargo Internal [facility]   [facility] Dr. Romeo 10/30/13 OV (last visit) Internal [facility] ED visit 7/13/16 [facility] PACS                         11/29/18: Internal referral from Dr. Jaylin Sands at [facility]. Records and imaging in [facility]

## 2018-12-03 ENCOUNTER — PATIENT OUTREACH (OUTPATIENT)
Dept: CARE COORDINATION | Facility: CLINIC | Age: 58
End: 2018-12-03

## 2018-12-10 DIAGNOSIS — G40.409 OTHER GENERALIZED EPILEPSY, NOT INTRACTABLE, WITHOUT STATUS EPILEPTICUS (H): ICD-10-CM

## 2018-12-10 RX ORDER — CARBAMAZEPINE 200 MG/1
CAPSULE, EXTENDED RELEASE ORAL
Qty: 450 CAPSULE | Refills: 2 | Status: CANCELLED | OUTPATIENT
Start: 2018-12-10

## 2018-12-10 NOTE — TELEPHONE ENCOUNTER
Patient appears to have appointment 12/11/18 w/ neurologist for second opinion of mediations and seizures.    Called patient. She has enough medications for 1-2 weeks. Would like to see what provider decides following appointment tomorrow. Will call if refill needed (will ask provider to refill at appointment)     Kale MORA RN   Specialty Clinics

## 2018-12-11 ENCOUNTER — OFFICE VISIT (OUTPATIENT)
Dept: NEUROLOGY | Facility: CLINIC | Age: 58
End: 2018-12-11
Payer: COMMERCIAL

## 2018-12-11 ENCOUNTER — TRANSFERRED RECORDS (OUTPATIENT)
Dept: HEALTH INFORMATION MANAGEMENT | Facility: CLINIC | Age: 58
End: 2018-12-11

## 2018-12-11 ENCOUNTER — PRE VISIT (OUTPATIENT)
Dept: NEUROLOGY | Facility: CLINIC | Age: 58
End: 2018-12-11

## 2018-12-11 VITALS
SYSTOLIC BLOOD PRESSURE: 117 MMHG | HEART RATE: 90 BPM | RESPIRATION RATE: 15 BRPM | TEMPERATURE: 98.2 F | HEIGHT: 66 IN | WEIGHT: 154.1 LBS | DIASTOLIC BLOOD PRESSURE: 58 MMHG | BODY MASS INDEX: 24.77 KG/M2 | OXYGEN SATURATION: 97 %

## 2018-12-11 DIAGNOSIS — G40.309 NONINTRACTABLE GENERALIZED IDIOPATHIC EPILEPSY WITHOUT STATUS EPILEPTICUS (H): ICD-10-CM

## 2018-12-11 DIAGNOSIS — G40.319 INTRACTABLE GENERALIZED EPILEPSY (H): Primary | ICD-10-CM

## 2018-12-11 DIAGNOSIS — G40.309 NONINTRACTABLE GENERALIZED IDIOPATHIC EPILEPSY WITHOUT STATUS EPILEPTICUS (H): Primary | ICD-10-CM

## 2018-12-11 ASSESSMENT — MIFFLIN-ST. JEOR: SCORE: 1297.57

## 2018-12-11 ASSESSMENT — PAIN SCALES - GENERAL: PAINLEVEL: NO PAIN (0)

## 2018-12-11 NOTE — LETTER
12/11/2018       RE: Magaly Renee  [address] [place] [address]     Dear Colleague,    Thank you for referring your patient, Magaly Renee, to the [facility] NEUROLOGY at [facility]. Please see a copy of my visit note below.      [facility]:  NEW PATIENT EVALUATION         Service Date: 12/11/2018      HISTORY:  Ms. Magaly Renee is a 58-year-old, right-handed woman who was referred by Dr. Shayy Sands for evaluation of epilepsy with medication adverse effects.  The patient came to the visit today with her  and both were able to provide detailed medical history.  I also reviewed prior medical records, including earliest notes and epilepsy dating back to 2001 at [facility] with older records scanned into the Epic files.      Ictal semiology-history:   The patient reported that she only ever had 2 types of seizures in her lifetime, as grand mal seizures and absence seizures.      The grand mal seizures first occurred at 14 years of age.  She thinks that she probably had 8-12 total grand mal seizures widely spread out over the years, usually with 3-year intervals or longer between seizures.  The most recent of these have occurred in 2016.  These could arise from sleep or waking.  When awake, the patient never had any sort of aura or prodrome, but simply would find herself postictally down on the floor or ground with standing at onset with a severe headache, confusion and occasionally evidence of tongue biting or urinary incontinence.  Her  saw several of these events that arose from sleep in bed at night, described as rigid trunk and leg extension with arm flexion up to the chest and rhythmic generalized jerking with complete unresponsiveness, lasting about 1 minute.  He often noted confused postictal behaviors for 15 minutes or longer after a seizure.      The absence seizures probably began sometime in the  early teens.  She often had clusters with multiple absences on the same day, but with many days or even months between seizures, as recently as a decade ago.  The episodes since have declined in frequency and the last of these was witnessed, perhaps 5 years ago.  The patient herself would usually have no idea that one had occurred.  She never had any aura or postictal state with these.  Witnesses would simply note that she suddenly stopped talking and stopped moving, with no response to voice or touch, but with eyes open.  She would not fall down or change posture during these brief seizures.  Her  thought that these consistently lasted less than 20 seconds, and he did not witness any postictal state.      The patient denied that she has ever had a paroxysmal episode of sudden brief confusion, non-convulsive falling with unconsciousness, repetitive involuntary movements or posturing, or other paroxysmal phenomena.  She denied any history of convulsive status epilepticus, or complex partial status epilepticus.  She denied any history of sudden involuntary jerks while fully awake, but she has had hypnic jerks.      The patient does not recognize exacerbating or remitting factors with regard to seizure frequency.      Epilepsy-seizure predispositions:   The patient has no family history of epilepsy or seizures.  She has no history of gestational or  injury, febrile convulsions, developmental delay, stroke, meningitis, encephalitis, significant head injury, or other epileptic predispositions.  She denied a history of physical or sexual abuse by an adult during her childhood or adulthood.      Laboratory evaluations:   Scanned records from [facility] indicate that a normal brain MRI was obtained on 2001.    Multiple interictal EEG recordings were interpreted as showing generalized spike wave discharges, but sometimes also as showing left parietal or other left-sided focal  spike-wave complexes, with the earliest EEG report dating to 2001 at [facility].      The patient had a prolonged outpatient video EEG on 2018, at the [facility], which showed a burst of approximately 3-per-second spike-wave discharges which was atypical due to variable absence of spikes in the presence of high amplitude slow waves and which also showed a single focal left centroparietotemporal spike-wave complex.      Epilepsy therapeutics:   The patient remembers that initially after her first grand mal seizure, she was started directly on phenytoin and phenobarbital, which she found highly sedating.  Over the years she was converted to various combinations of clonazepam, carbamazepine, lamotrigine and levetiracetam.  For the last approximately 10 years, she has been on a combination of levetiracetam, lamotrigine and carbamazepine with intermittent periods of blurry or double vision lasting several hours.  These improved when she began to take her medications with meals, but she still has a severe episode at least once per month.      Other history:   The patient began having migraine headaches in her 30S, which she usually were left retro-orbital or right retro-orbital in alternation.  These were not associated with nausea, vomiting, photophobia or phonophobia.  They might last many hours if untreated, but they are markedly aborted with the use of Fioricet.  They have improved over the years and currently are occurring about once per month or less.     PAST MEDICAL-SURGICAL HISTORY:    1.  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     2.  Chronic migraine headaches.   3.  Status post  section.     FAMILY HISTORY:  There is no family history of seizures or epilepsy.  Her mother had recurrent migraine headaches, but there is no other history of neurologic disease in the family.        PERSONAL AND  SOCIAL HISTORY:  The patient grew up in [place].  She completed her education with a bachelor's degree in mathematics at [employer].  She lives with her  and the younger 2 of their 3 sons.  She previously worked in business, primarily accounting.     She does not use alcohol, tobacco or illicit recreational drugs        REVIEW OF SYSTEMS:  The patient denied history of attention and memory disturbance, difficulties with comprehension and expression, difficulty in solving problems, weakness, tremors or other abnormal involuntary movements, numbness or tingling, incoordination, falling or difficulty in walking, urinary or fecal incontinence, headache and other pain, except as described above.  The patient denied any history of severe depression or suicidal ideation, severe anxiety, panic attacks, hallucinations, delusions, psychosis, substance abuse, or psychiatric hospitalization.  She denied rashes and easy bruising.  The remainder of a 14-system symptom review was negative except as noted above.       MEDICATIONS:  Levetiracetam 3000 mg b.i.d., lamotrigine 100 mg b.i.d., carbamazepine extended release 200 mg b.i.d., and other medications as per the electronic medical record.     PHYSICAL EXAMINATION:    On examination, the patient was an alert woman in no apparent distress.  Pulse was 90 per minute.  Blood pressure was 117/58.  Respirations were 18 per minute.  Height was 66 inches, weight was 154 pounds.  Skull was normocephalic and atraumatic.  Neck was supple, without signs of meningeal irritation.      On neurological examination the patient appeared alert and was fully oriented to person, place, time, and reason for visit.  Speech showed normal articulation, fluency, repetitions, naming, syntax and comprehension.  She accurately repeated digits sequences, repeating 8 forward and 5 reversed.  At 10 minutes from presentation, 4 of 4 phrases were recalled.  No apraxias or atavistic signs were  elicited.  Fundoscopy was normal bilaterally.  Cranial nerves III through XII were normal.  Muscle masses, tones and strengths were normal throughout.  There was no pronator drift.  Sequential fine finger movements were performed normally with each hand.  No spontaneous tremors, myoclonus, or other abnormal movements were observed.  Sensations of light touch, pin prick, vibration and proprioception were reportedly normal throughout.  The rapid alternating movements, and finger-nose-finger and heel-shin maneuvers were performed normally bilaterally.  Romberg maneuver was negative.  Regular, heel, toe, tandem and reverse tandem walking were normal.  Deep tendon reflexes were normal and symmetric throughout.  Toes were downgoing bilaterally.      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  She strongly denied any history of myoclonus.  In addition to generalized spike-wave discharges, several electroencephalograms have shown left parietal or other left hemispheric spike-wave complexes.  Possibly she has atypical interictal electroencephalographic abnormalities and idiopathic generalized epilepsy, although another possibility would be a partial epilepsy with secondary bilateral synchrony, causing complex partial and secondarily generalized tonic-clonic seizures.  We did agree to obtain a high resolution brain MRI, as the MRI technology available in 2001, at the time of her most recent scan, would not be sensitive to focal cortical dysplasia or other subtle abnormalities that might be a cause of partial epilepsy.      When the patient returns to the clinic, we will talk about making medication changes, as she has intermittent double vision, despite not using as much lamotrigine as was originally prescribed.  If we find an entirely normal brain MRI, my tendency would be to  taper off carbamazepine and optimize lamotrigine as a more specific regimen for idiopathic generalized epilepsies.  If she has focal cortical dysplasia in the left hemisphere, this would more strongly favor partial epilepsy and it would be more reasonable to optimize carbamazepine in this case.      The patient has fairly good migraine control, which she will follow with Dr. Jaylin Sands.        The patient spontaneously noted that she would not drive for 3 months if seizures recurred.  I reviewed [place] regulations on seizures and driving with the patient.  She appeared to clearly understand that she is prohibited from operating a motor vehicle within 3 months following any seizure or other episode with sudden unconsciousness or inability to sit up, and that she is required to report any future such seizure to the [facility] within 30 days after the event.  I also recommended that she and her family review all of her other activities, and avoid any activities that might lead to self-injury or injury of others, within 3 months following any seizure with impaired awareness or impaired motor control.  Such activities include but are not limited to holding babies or young children at heights from which they might be injured if dropped, bathing infants or young children in situations in which they might drown without continuous interactive care by an adult who is fully capable at all times during the bath, operating power cutting or other tools, handling firearms, exposure to heights from which she might fall, exposure to vessels with hot cooking oil or water, and tub-bathing or swimming alone.      PLAN:    1.  Brain 3 Naomie Beth MRI with seizure protocol.     2.  She will not plan to change her lamotrigine, lacosamide and levetiracetam doses prior to return to this clinic.   2.  Return to clinic in approximately 3 months, or earlier if she completes the MRI before that time.   I spent 65 minutes in this patient  care, more than 50% of which consisted of counseling and coordinating care.        D: 2018   T: 2018   MT: HERNAN      Name:     FRANCISCO TOMLINSON   MRN:      [id number]        Account:      [id number]   :      1960           Service Date: 2018      Document: [id number]        Again, thank you for allowing me to participate in the care of your patient.      Sincerely,    Yassine Eduardo MD

## 2018-12-11 NOTE — NURSING NOTE
Chief Complaint   Patient presents with     Consult     [facility] NEW SEIZURES     Seizures     Preventive Care:    Breast Cancer Screening: During our visit today, we discussed that it is recommended you receive breast cancer screening. Please call or make an appointment with your primary care provider to discuss this with them. You may also call the  [facility] scheduling line ([phone]) to set up a mammography appointment at the [facility] within the [facility].    Colorectal Cancer Screening: During our visit today, we discussed that it is recommended you receive colorectal cancer screening. Please call or make an appointment with your primary care provider to discuss this. You may also call the  [facility] scheduling line ([phone]) to set up a colonoscopy appointment.      All Neves, EMT

## 2018-12-12 NOTE — PROGRESS NOTES
[facility]:  NEW PATIENT EVALUATION         Service Date: 12/11/2018      HISTORY:  Ms. Magaly Renee is a 58-year-old, right-handed woman who was referred by Dr. Shayy Sands for evaluation of epilepsy with medication adverse effects.  The patient came to the visit today with her  and both were able to provide detailed medical history.  I also reviewed prior medical records, including earliest notes and epilepsy dating back to 2001 at [facility] with older records scanned into the Epic files.      Ictal semiology-history:   The patient reported that she only ever had 2 types of seizures in her lifetime, as grand mal seizures and absence seizures.      The grand mal seizures first occurred at 14 years of age.  She thinks that she probably had 8-12 total grand mal seizures widely spread out over the years, usually with 3-year intervals or longer between seizures.  The most recent of these have occurred in 2016.  These could arise from sleep or waking.  When awake, the patient never had any sort of aura or prodrome, but simply would find herself postictally down on the floor or ground with standing at onset with a severe headache, confusion and occasionally evidence of tongue biting or urinary incontinence.  Her  saw several of these events that arose from sleep in bed at night, described as rigid trunk and leg extension with arm flexion up to the chest and rhythmic generalized jerking with complete unresponsiveness, lasting about 1 minute.  He often noted confused postictal behaviors for 15 minutes or longer after a seizure.      The absence seizures probably began sometime in the early teens.  She often had clusters with multiple absences on the same day, but with many days or even months between seizures, as recently as a decade ago.  The episodes since have declined in frequency and the last of these was witnessed, perhaps 5 years ago.  The patient herself  would usually have no idea that one had occurred.  She never had any aura or postictal state with these.  Witnesses would simply note that she suddenly stopped talking and stopped moving, with no response to voice or touch, but with eyes open.  She would not fall down or change posture during these brief seizures.  Her  thought that these consistently lasted less than 20 seconds, and he did not witness any postictal state.      The patient denied that she has ever had a paroxysmal episode of sudden brief confusion, non-convulsive falling with unconsciousness, repetitive involuntary movements or posturing, or other paroxysmal phenomena.  She denied any history of convulsive status epilepticus, or complex partial status epilepticus.  She denied any history of sudden involuntary jerks while fully awake, but she has had hypnic jerks.      The patient does not recognize exacerbating or remitting factors with regard to seizure frequency.      Epilepsy-seizure predispositions:   The patient has no family history of epilepsy or seizures.  She has no history of gestational or  injury, febrile convulsions, developmental delay, stroke, meningitis, encephalitis, significant head injury, or other epileptic predispositions.  She denied a history of physical or sexual abuse by an adult during her childhood or adulthood.      Laboratory evaluations:   Scanned records from [facility] indicate that a normal brain MRI was obtained on 2001.    Multiple interictal EEG recordings were interpreted as showing generalized spike wave discharges, but sometimes also as showing left parietal or other left-sided focal spike-wave complexes, with the earliest EEG report dating to 2001 at [facility].      The patient had a prolonged outpatient [place] on [place] [facility] showed a burst of approximately 3-per-second spike-wave discharges which was atypical due to variable absence of spikes in  the presence of high amplitude slow waves and which also showed a single focal left centroparietotemporal spike-wave complex.      Epilepsy therapeutics:   The patient remembers that initially after her first grand mal seizure, she was started directly on phenytoin and phenobarbital, which she found highly sedating.  Over the years she was converted to various combinations of clonazepam, carbamazepine, lamotrigine and levetiracetam.  For the last approximately 10 years, she has been on a combination of levetiracetam, lamotrigine and carbamazepine with intermittent periods of blurry or double vision lasting several hours.  These improved when she began to take her medications with meals, but she still has a severe episode at least once per month.      Other history:   The patient began having migraine headaches in her 30S, which she usually were left retro-orbital or right retro-orbital in alternation.  These were not associated with nausea, vomiting, photophobia or phonophobia.  They might last many hours if untreated, but they are markedly aborted with the use of Fioricet.  They have improved over the years and currently are occurring about once per month or less.     PAST MEDICAL-SURGICAL HISTORY:    1.  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     2.  Chronic migraine headaches.   3.  Status post  section.     FAMILY HISTORY:  There is no family history of seizures or epilepsy.  Her mother had recurrent migraine headaches, but there is no other history of neurologic disease in the family.        PERSONAL AND SOCIAL HISTORY:  The patient grew up in [place].  She completed her education with a bachelor's degree in mathematics at [employer].  She lives with her  and the younger 2 of their 3 sons.  She previously worked in business, primarily accounting.     She does not use alcohol, tobacco  or illicit recreational drugs        REVIEW OF SYSTEMS:  The patient denied history of attention and memory disturbance, difficulties with comprehension and expression, difficulty in solving problems, weakness, tremors or other abnormal involuntary movements, numbness or tingling, incoordination, falling or difficulty in walking, urinary or fecal incontinence, headache and other pain, except as described above.  The patient denied any history of severe depression or suicidal ideation, severe anxiety, panic attacks, hallucinations, delusions, psychosis, substance abuse, or psychiatric hospitalization.  She denied rashes and easy bruising.  The remainder of a 14-system symptom review was negative except as noted above.       MEDICATIONS:  Levetiracetam 3000 mg b.i.d., lamotrigine 100 mg b.i.d., carbamazepine extended release 200 mg b.i.d., and other medications as per the electronic medical record.     PHYSICAL EXAMINATION:    On examination, the patient was an alert woman in no apparent distress.  Pulse was 90 per minute.  Blood pressure was 117/58.  Respirations were 18 per minute.  Height was 66 inches, weight was 154 pounds.  Skull was normocephalic and atraumatic.  Neck was supple, without signs of meningeal irritation.      On neurological examination the patient appeared alert and was fully oriented to person, place, time, and reason for visit.  Speech showed normal articulation, fluency, repetitions, naming, syntax and comprehension.  She accurately repeated digits sequences, repeating 8 forward and 5 reversed.  At 10 minutes from presentation, 4 of 4 phrases were recalled.  No apraxias or atavistic signs were elicited.  Fundoscopy was normal bilaterally.  Cranial nerves III through XII were normal.  Muscle masses, tones and strengths were normal throughout.  There was no pronator drift.  Sequential fine finger movements were performed normally with each hand.  No spontaneous tremors, myoclonus, or other  abnormal movements were observed.  Sensations of light touch, pin prick, vibration and proprioception were reportedly normal throughout.  The rapid alternating movements, and finger-nose-finger and heel-shin maneuvers were performed normally bilaterally.  Romberg maneuver was negative.  Regular, heel, toe, tandem and reverse tandem walking were normal.  Deep tendon reflexes were normal and symmetric throughout.  Toes were downgoing bilaterally.      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  She strongly denied any history of myoclonus.  In addition to generalized spike-wave discharges, several electroencephalograms have shown left parietal or other left hemispheric spike-wave complexes.  Possibly she has atypical interictal electroencephalographic abnormalities and idiopathic generalized epilepsy, although another possibility would be a partial epilepsy with secondary bilateral synchrony, causing complex partial and secondarily generalized tonic-clonic seizures.  We did agree to obtain a high resolution brain MRI, as the MRI technology available in 2001, at the time of her most recent scan, would not be sensitive to focal cortical dysplasia or other subtle abnormalities that might be a cause of partial epilepsy.      When the patient returns to the clinic, we will talk about making medication changes, as she has intermittent double vision, despite not using as much lamotrigine as was originally prescribed.  If we find an entirely normal brain MRI, my tendency would be to taper off carbamazepine and optimize lamotrigine as a more specific regimen for idiopathic generalized epilepsies.  If she has focal cortical dysplasia in the left hemisphere, this would more strongly favor partial epilepsy and it would be more reasonable to optimize carbamazepine in this case.      The  patient has fairly good migraine control, which she will follow with Dr. Jaylin Sands.        The patient spontaneously noted that she would not drive for 3 months if seizures recurred.  I reviewed [place] regulations on seizures and driving with the patient.  She appeared to clearly understand that she is prohibited from operating a motor vehicle within 3 months following any seizure or other episode with sudden unconsciousness or inability to sit up, and that she is required to report any future such seizure to the [facility] within 30 days after the event.  I also recommended that she and her family review all of her other activities, and avoid any activities that might lead to self-injury or injury of others, within 3 months following any seizure with impaired awareness or impaired motor control.  Such activities include but are not limited to holding babies or young children at heights from which they might be injured if dropped, bathing infants or young children in situations in which they might drown without continuous interactive care by an adult who is fully capable at all times during the bath, operating power cutting or other tools, handling firearms, exposure to heights from which she might fall, exposure to vessels with hot cooking oil or water, and tub-bathing or swimming alone.      PLAN:    1.  Brain 3 Naomie Beth MRI with seizure protocol.     2.  She will not plan to change her lamotrigine, lacosamide and levetiracetam doses prior to return to this clinic.   2.  Return to clinic in approximately 3 months, or earlier if she completes the MRI before that time.   I spent 65 minutes in this patient care, more than 50% of which consisted of counseling and coordinating care.       Yassine Eduardo M.D.   Professor of Neurology        D: 2018   T: 2018   MT: HERNAN      Name:     FRANCISCO TOMLINSON   MRN:      [id number]        Account:      [id number]   :      1960           Service  Date: 12/11/2018      Document: [id number]

## 2018-12-13 LAB
LAMOTRIGINE SERPL-MCNC: 2 UG/ML (ref 2.5–15)
LEVETIRACETAM SERPL-MCNC: 55 UG/ML (ref 12–46)

## 2018-12-14 LAB
CARBAMAZEPINE EP SERPL-MCNC: 1.5 UG/ML
CARBAMAZEPINE SERPL-MCNC: 5.9 UG/ML

## 2018-12-14 NOTE — PROCEDURES
[facility] EEG #  (Out-Patient Video-EEG Monitoring)    Name:     Magaly Renee   MRN: [id number]   : 1960   Procedure Date: 2018   Duration of Recordin hours, 50 minutes.      CLINICAL SUMMARY:  This diagnostic video-EEG monitoring procedure was performed in evaluation of grand mal seizures in Magaly Renee.  She was reported to be receiving carbamazepine, lamotrigine, levetiracetam, butalbital and codeine at the time of this recording.      TECHNICAL SUMMARY:  This continuous EEG monitoring procedure was performed with 23 scalp electrodes in 10-20 system placements, and additional scalp, precordial and other surface electrodes used for electrical referencing and artifact detection.  A single channel of EKG was recorded for purposes of analyzing EKG artifacts in the EEG channels.  Video monitoring was utilized and periodically reviewed by EEG technologist and the physician for electroclinical correlation.    INTERICTAL EEG ACTIVITIES:  During waking there was a poorly sustained bilateral posterior dominant rhythm at approximately 10 Hz.  Predominant electrocerebral activities during waking consisted of generalized irregular 4-20 Hz theta-alpha-beta activities, with a frontocentral midline predominance of beta activities.  Drowsiness was manifested by increased rhythmicity of background theta activities with slow lateral eye movements.  Photic stimulation resulted in bilateral driving at several frequencies of stimulation.  Hyperventilation resulted in a brief, transitory buildup of generalized symmetric theta and delta activities.      During drowsiness there was a single burst of generalized rhythmic approximately 3 Hz slow wave activity of frontal predominance with the initial 3 seconds at high amplitude and the remainder at moderate amplitude, with intermixed anteriorly predominant spikes of left-sided predominance.      During drowsiness there was a single occurrence of an apparent  right occipital-parietal-temporal spike-wave complex.      ICTAL RECORDINGS:  No electrographic seizures and no paroxysmal behavioral events occurred during this procedure.      SUMMARY OF VIDEO-EEG MONITORING:    The interictal EEG recording in waking and drowsiness was abnormal due to generalized theta slowing during waking, a single burst of atypical generalized spike-wave complexes with shifting predominance greater on the left and a single occurrence of apparent right occipital-parietal-temporal spike-wave discharge.  No electrographic seizures and no paroxysmal behavioral events were recorded.    These findings indicate moderate electrographic encephalopathy, which is etiologically nonspecific.    The occurrence of a single apparent atypical generalized spike-wave burst and a single apparent focal left occipital-parietal-temporal spike-wave complex is inconclusive, although these findings are unlikely to merely represent random confluence of background activities.    These findings could support diagnoses of generalized epilepsy or partial epilepsy, and in the absence of seizure recordings or repeated stereotyped epileptiform transients, are inconclusive with regard to an epilepsy diagnosis.  Clinical correlation is recommended.   Clinical correlation is recommended.   Yassine Eduardo M.D., Professor of Neurology      D: 2018   T: 2018   MT: HAYLEY      Name:     FRANCISCO TOMLINSON   MRN:      [id number]        Account:        [id number]   :      1960           Procedure Date: 2018      Document: [id number]

## 2018-12-18 ENCOUNTER — ANCILLARY PROCEDURE (OUTPATIENT)
Dept: MRI IMAGING | Facility: CLINIC | Age: 58
End: 2018-12-18
Attending: PSYCHIATRY & NEUROLOGY
Payer: COMMERCIAL

## 2018-12-18 DIAGNOSIS — G40.309 NONINTRACTABLE GENERALIZED IDIOPATHIC EPILEPSY WITHOUT STATUS EPILEPTICUS (H): ICD-10-CM

## 2018-12-18 RX ORDER — GADOBUTROL 604.72 MG/ML
0.1 INJECTION INTRAVENOUS ONCE
Status: COMPLETED | OUTPATIENT
Start: 2018-12-18 | End: 2018-12-18

## 2018-12-18 RX ADMIN — GADOBUTROL 6.99 ML: 604.72 INJECTION INTRAVENOUS at 11:22

## 2018-12-19 ENCOUNTER — TELEPHONE (OUTPATIENT)
Dept: NEUROLOGY | Facility: CLINIC | Age: 58
End: 2018-12-19

## 2018-12-19 DIAGNOSIS — H47.49 DISORDER OF OPTIC CHIASM: Primary | ICD-10-CM

## 2018-12-20 NOTE — TELEPHONE ENCOUNTER
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    As discussed by telephone, we will request consultation with a neurosurgeon who is an expert in this area.  Yassine Eduardo M.D.

## 2018-12-24 DIAGNOSIS — G40.409 OTHER GENERALIZED EPILEPSY, NOT INTRACTABLE, WITHOUT STATUS EPILEPTICUS (H): ICD-10-CM

## 2018-12-24 DIAGNOSIS — G40.909 EPILEPSY (H): ICD-10-CM

## 2018-12-24 NOTE — TELEPHONE ENCOUNTER
Pt had workup with another Neurologist.    Forwarding to Dr Sands to consider refill.    Tanya Corona RN  [facility]

## 2018-12-24 NOTE — TELEPHONE ENCOUNTER
Reason for Call:  Medication or medication refill:    Do you use a [facility]?  Name of the pharmacy and phone number for the current request:  [facility] [place] (Ph: [phone])    Name of the medication requested: Levetiracetam 1000 mg; Carbamazepine 200 mg    Other request:   LAST REFILL: 10/09/2018  LOV: 10/25/2018    Can we leave a detailed message on this number? Not Applicable    Phone number patient can be reached at: Home number on file [phone] (home)    Best Time: NA    Call taken on 12/24/2018 at 8:25 AM by Denise Behrendt

## 2019-01-03 RX ORDER — CARBAMAZEPINE 200 MG/1
CAPSULE, EXTENDED RELEASE ORAL
Qty: 150 CAPSULE | Refills: 1 | Status: SHIPPED | OUTPATIENT
Start: 2019-01-03 | End: 2019-01-08

## 2019-01-03 RX ORDER — LEVETIRACETAM 1000 MG/1
TABLET ORAL
Qty: 180 TABLET | Refills: 1 | Status: SHIPPED | OUTPATIENT
Start: 2019-01-03 | End: 2019-03-12

## 2019-01-03 NOTE — TELEPHONE ENCOUNTER
Texted Provider (who is out of clinic today) to alert to urgent refills in pool.  Got confirmation text that she would get into Epic today and address.  Tanya Corona RN  [facility]

## 2019-01-03 NOTE — TELEPHONE ENCOUNTER
[facility] pharmacy faxed request stating needs 3 month supply on Levetiracetam per ins.     Veronica Daniel  Specialty CSS

## 2019-01-03 NOTE — TELEPHONE ENCOUNTER
Pt calling to check to check status of med refill. She only has one pill left and has been waiting for this refill for a week.     Please call    Veronica Daniel  Specialty CSS

## 2019-01-03 NOTE — TELEPHONE ENCOUNTER
Pt calling to check on status of this request. She says she saw Dr. Eduardo, whom she was referred to by Dr. Sands and that she was told by Dr. Sands she would continue to fill her Levetiracetam (she only has 1 pill left right now).     Please call pt @:  [phone] (ok to leave message)

## 2019-01-04 DIAGNOSIS — G40.409 OTHER GENERALIZED EPILEPSY, NOT INTRACTABLE, WITHOUT STATUS EPILEPTICUS (H): ICD-10-CM

## 2019-01-04 NOTE — TELEPHONE ENCOUNTER
Please check with Dr. Eduardo's office  to make sure he wants her to continue these medications - It appears that the Keppra dose is the same as documented, but the carbamazepine dose is different than in his recent note, so I just want to make sure the epileptologist is in agreement before refilling. Otherwise, ok to fill from my standpoint. I will check for a response later in the day so we can get these in to the pharmacy before day's end.     JENNIFER (

## 2019-01-07 NOTE — TELEPHONE ENCOUNTER
"Message received from Dr. Eduardo:     \"...Please confirm that she is only taking levetiracetam and carbamazepine (no longer on lamotrigine) and that she has no adverse effects, and we can take these prescriptions over.  Thanks.\"    Call left at patient's contact number requesting a call back. We will need to discuss the above with her when she calls back. Compa MERCHANT  "

## 2019-01-08 RX ORDER — CARBAMAZEPINE 200 MG/1
CAPSULE, EXTENDED RELEASE ORAL
Qty: 150 CAPSULE | Refills: 1 | Status: SHIPPED | OUTPATIENT
Start: 2019-01-08 | End: 2019-03-12

## 2019-01-08 RX ORDER — LEVETIRACETAM 1000 MG/1
3000 TABLET ORAL 2 TIMES DAILY
Qty: 180 TABLET | Refills: 0 | Status: SHIPPED | OUTPATIENT
Start: 2019-01-08 | End: 2019-01-10

## 2019-01-09 NOTE — TELEPHONE ENCOUNTER
Patient notified that the refills were sent in by Dr. Eduardo. Patient is concerned that one month of refill will not get her through to her return appt with Dr. Eduardo. She is advised to schedule one in March as previously advised and I will request a further refill from Dr. Eduardo.

## 2019-01-10 ENCOUNTER — TELEPHONE (OUTPATIENT)
Dept: NEUROLOGY | Facility: CLINIC | Age: 59
End: 2019-01-10

## 2019-01-10 DIAGNOSIS — G40.409 OTHER GENERALIZED EPILEPSY, NOT INTRACTABLE, WITHOUT STATUS EPILEPTICUS (H): ICD-10-CM

## 2019-01-10 RX ORDER — LEVETIRACETAM 1000 MG/1
3000 TABLET ORAL 2 TIMES DAILY
Qty: 180 TABLET | Refills: 3 | Status: SHIPPED | OUTPATIENT
Start: 2019-01-10 | End: 2019-03-12

## 2019-01-10 NOTE — TELEPHONE ENCOUNTER
----- Message from Yassine Eduardo MD sent at 1/10/2019  8:57 AM CST -----  Regarding: RE: needs more than one month Rx    Okay, please set up adequate refills of her AEDs, and I will sign them.  Thanks.      ----- Message -----  From: Angelia Kapoor RN  Sent: 1/9/2019   9:59 AM  To: Yassine Eduardo MD  Subject: needs more than one month Rx                     Patient needs a refill to get her through to her return appointment with you in March (which she is calling [facility] to schedule).

## 2019-01-11 ENCOUNTER — OFFICE VISIT (OUTPATIENT)
Dept: NEUROSURGERY | Facility: CLINIC | Age: 59
End: 2019-01-11
Attending: NEUROLOGICAL SURGERY
Payer: COMMERCIAL

## 2019-01-11 VITALS
SYSTOLIC BLOOD PRESSURE: 120 MMHG | HEART RATE: 87 BPM | TEMPERATURE: 98.2 F | BODY MASS INDEX: 25.54 KG/M2 | OXYGEN SATURATION: 97 % | DIASTOLIC BLOOD PRESSURE: 69 MMHG | WEIGHT: 157.3 LBS

## 2019-01-11 DIAGNOSIS — D43.2 NEOPLASM OF UNCERTAIN BEHAVIOR OF BRAIN AND SPINAL CORD (H): Primary | ICD-10-CM

## 2019-01-11 DIAGNOSIS — H47.49 DISORDER OF OPTIC CHIASM: ICD-10-CM

## 2019-01-11 DIAGNOSIS — D43.4 NEOPLASM OF UNCERTAIN BEHAVIOR OF BRAIN AND SPINAL CORD (H): Primary | ICD-10-CM

## 2019-01-11 PROCEDURE — G0463 HOSPITAL OUTPT CLINIC VISIT: HCPCS | Mod: ZF

## 2019-01-11 ASSESSMENT — PAIN SCALES - GENERAL: PAINLEVEL: NO PAIN (0)

## 2019-01-11 NOTE — NURSING NOTE
"Oncology Rooming Note    January 11, 2019 4:46 PM   Magaly Renee is a 58 year old female who presents for:    Chief Complaint   Patient presents with     Consult     New- Optic Chiasm     Initial Vitals: /69   Pulse 87   Temp 98.2  F (36.8  C) (Oral)   Wt 71.4 kg (157 lb 4.8 oz)   LMP 07/06/2016 (Approximate)   SpO2 97%   BMI 25.54 kg/m   Estimated body mass index is 25.54 kg/m  as calculated from the following:    Height as of 12/11/18: 1.671 m (5' 5.8\").    Weight as of this encounter: 71.4 kg (157 lb 4.8 oz). Body surface area is 1.82 meters squared.  No Pain (0) Comment: Data Unavailable   Patient's last menstrual period was 07/06/2016 (approximate).  Allergies reviewed: Yes  Medications reviewed: Yes    Medications: Medication refills not needed today.  Pharmacy name entered into [facility]:    [facility] [place], [place] - [address] [id number] - [address] [place], [place] - [address] [address]    Clinical concerns: None     8 minutes for nursing intake (face to face time)     Jasmyne Rivera CMA              "

## 2019-01-11 NOTE — LETTER
1/11/2019       RE: Magaly Renee  [address] [place] [address]     Dear Colleague,    Thank you for referring your patient, Magaly Renee, to the [facility]. Please see a copy of my visit note below.    Neurosurgery clinic note    Evaluation for an incidentally discovered enlargement of the left optic chiasm, without evidence of CE.    58 F who underwent an MRI on 12/18/2018 as part of the work-up for her epilepsy. 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    On review of system, the patient denied changes in her vision over the past three years. Negative for neurologic symptoms.    Past Medical History:   Diagnosis Date     Localization-related epilepsy (H)      Migraine      Migraine, unspecified, without mention of intractable migraine without mention of status migrainosus 2006    Migraine     Other convulsions age of 12    Epilepsy       Current Outpatient Medications:      aspirin 325 MG tablet, Take by mouth daily as needed for moderate pain, Disp: , Rfl:      butalbital-acetaminophen-caffeine (FIORICET/ESGIC) -40 MG per tablet, Take 1 tablet by mouth every 4 hours as needed for headaches or moderate to severe pain, Disp: 12 tablet, Rfl: 1     butalbital-APAP-caffeine-codeine (FIORICET WITH CODEINE) -81-30 MG per capsule, Take 1 capsule by mouth every 6 hours as needed for headaches (do not use more than 3 times per week) To be used as rescue medication if other meds do not work., Disp: 20 capsule, Rfl: 0     carBAMazepine (CARBATROL) 200 MG 12 hr capsule, TAKE 2 CAPS IN AM AND 3 CAPS IN PM., Disp: 150 capsule, Rfl: 1     cetirizine (ZYRTEC) 10 MG tablet, Take 1 tablet (10 mg) by mouth every morning (Needs follow-up appointment for this medication), Disp: 30 tablet, Rfl: 0     fluticasone (FLONASE) 50 MCG/ACT nasal spray, Spray 2 sprays into both nostrils daily (Patient taking differently: Spray 2  sprays into both nostrils daily as needed ), Disp: 1 Package, Rfl: 1     lamoTRIgine (LAMICTAL) 100 MG tablet, Take 2 tablets (200 mg) by mouth 2 times daily (or as directed), Disp: 360 tablet, Rfl: 3     levETIRAcetam (KEPPRA) 1000 MG tablet, Take 3 tablets (3,000 mg) by mouth 2 times daily, Disp: 180 tablet, Rfl: 3     levETIRAcetam (KEPPRA) 1000 MG tablet, Take 3 tablets (3000mg) two times daily., Disp: 180 tablet, Rfl: 1     olopatadine (PATANOL) 0.1 % ophthalmic solution, INSTILL 1 DROP INTO BOTH EYES TWO TIMES A DAY AS NEEDED, Disp: 5 mL, Rfl: 0     levETIRAcetam (KEPPRA) 500 MG tablet, Take with 1000mg tablets for following taper: 2500mg in the morning and 3000mg in the evening for 2 weeks, then 2500mg twice daily for two weeks, then recheck labs. (Patient not taking: Reported on 12/11/2018), Disp: 60 tablet, Rfl: 0    Allergies   Allergen Reactions     Bee Venom Anaphylaxis     /69   Pulse 87   Temp 98.2  F (36.8  C) (Oral)   Wt 71.4 kg (157 lb 4.8 oz)   LMP 07/06/2016 (Approximate)   SpO2 97%   BMI 25.54 kg/m       Examination non-focal. Specifically, EOMI, VFF, PERRL at 3 mm.    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 Given the history and the lack of CE, it is likely that the lesion is benign in nature. However, carefully follow-up is warranted. I will schedule the patient for a repeat MRI in 2 month as well as a detailed neuro-ophthalmologic examination as baseline assessment. All questions were answered.  Patient assessment and encounter required 65 minutes of dedicated time.      Again, thank you for allowing me to participate in the care of your patient.      Sincerely,    Luke Molina MD

## 2019-01-12 NOTE — PROGRESS NOTES
Neurosurgery clinic note    Evaluation for an incidentally discovered enlargement of the left optic chiasm, without evidence of CE.    58 F who underwent an MRI on 12/18/2018 as part of the work-up for her epilepsy. 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    On review of system, the patient denied changes in her vision over the past three years. Negative for neurologic symptoms.    Past Medical History:   Diagnosis Date     Localization-related epilepsy (H)      Migraine      Migraine, unspecified, without mention of intractable migraine without mention of status migrainosus 2006    Migraine     Other convulsions age of 12    Epilepsy       Current Outpatient Medications:      aspirin 325 MG tablet, Take by mouth daily as needed for moderate pain, Disp: , Rfl:      butalbital-acetaminophen-caffeine (FIORICET/ESGIC) -40 MG per tablet, Take 1 tablet by mouth every 4 hours as needed for headaches or moderate to severe pain, Disp: 12 tablet, Rfl: 1     butalbital-APAP-caffeine-codeine (FIORICET WITH CODEINE) -57-30 MG per capsule, Take 1 capsule by mouth every 6 hours as needed for headaches (do not use more than 3 times per week) To be used as rescue medication if other meds do not work., Disp: 20 capsule, Rfl: 0     carBAMazepine (CARBATROL) 200 MG 12 hr capsule, TAKE 2 CAPS IN AM AND 3 CAPS IN PM., Disp: 150 capsule, Rfl: 1     cetirizine (ZYRTEC) 10 MG tablet, Take 1 tablet (10 mg) by mouth every morning (Needs follow-up appointment for this medication), Disp: 30 tablet, Rfl: 0     fluticasone (FLONASE) 50 MCG/ACT nasal spray, Spray 2 sprays into both nostrils daily (Patient taking differently: Spray 2 sprays into both nostrils daily as needed ), Disp: 1 Package, Rfl: 1     lamoTRIgine (LAMICTAL) 100 MG tablet, Take 2 tablets (200 mg) by mouth 2 times daily (or as directed), Disp: 360 tablet, Rfl: 3     levETIRAcetam (KEPPRA) 1000 MG tablet,  Take 3 tablets (3,000 mg) by mouth 2 times daily, Disp: 180 tablet, Rfl: 3     levETIRAcetam (KEPPRA) 1000 MG tablet, Take 3 tablets (3000mg) two times daily., Disp: 180 tablet, Rfl: 1     olopatadine (PATANOL) 0.1 % ophthalmic solution, INSTILL 1 DROP INTO BOTH EYES TWO TIMES A DAY AS NEEDED, Disp: 5 mL, Rfl: 0     levETIRAcetam (KEPPRA) 500 MG tablet, Take with 1000mg tablets for following taper: 2500mg in the morning and 3000mg in the evening for 2 weeks, then 2500mg twice daily for two weeks, then recheck labs. (Patient not taking: Reported on 12/11/2018), Disp: 60 tablet, Rfl: 0    Allergies   Allergen Reactions     Bee Venom Anaphylaxis     /69   Pulse 87   Temp 98.2  F (36.8  C) (Oral)   Wt 71.4 kg (157 lb 4.8 oz)   LMP 07/06/2016 (Approximate)   SpO2 97%   BMI 25.54 kg/m      Examination non-focal. Specifically, EOMI, VFF, PERRL at 3 mm.    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 Given the history and the lack of CE, it is likely that the lesion is benign in nature. However, carefully follow-up is warranted. I will schedule the patient for a repeat MRI in 2 month as well as a detailed neuro-ophthalmologic examination as baseline assessment. All questions were answered.  Patient assessment and encounter required 65 minutes of dedicated time.

## 2019-01-22 DIAGNOSIS — G43.009 MIGRAINE WITHOUT AURA AND WITHOUT STATUS MIGRAINOSUS, NOT INTRACTABLE: ICD-10-CM

## 2019-01-22 NOTE — TELEPHONE ENCOUNTER
Reason for Call:  Medication or medication refill:    Do you use a [facility]?  Name of the pharmacy and phone number for the current request:  [facility] - [phone]    Name of the medication requested: Butalbital    Other request:   LAST REFILL: 11/29/2018  LOV: 10/25/2018    Can we leave a detailed message on this number? Not Applicable    Phone number patient can be reached at: Home number on file [phone] (home)    Best Time: NA    Call taken on 1/22/2019 at 2:44 PM by Denise Behrendt

## 2019-01-24 RX ORDER — BUTALBITAL, ACETAMINOPHEN AND CAFFEINE 50; 325; 40 MG/1; MG/1; MG/1
1 TABLET ORAL EVERY 4 HOURS PRN
Qty: 12 TABLET | Refills: 1 | Status: SHIPPED | OUTPATIENT
Start: 2019-01-24 | End: 2019-05-06

## 2019-02-15 ENCOUNTER — HEALTH MAINTENANCE LETTER (OUTPATIENT)
Age: 59
End: 2019-02-15

## 2019-03-12 ENCOUNTER — APPOINTMENT (OUTPATIENT)
Dept: LAB | Facility: CLINIC | Age: 59
End: 2019-03-12
Payer: COMMERCIAL

## 2019-03-12 ENCOUNTER — OFFICE VISIT (OUTPATIENT)
Dept: NEUROLOGY | Facility: CLINIC | Age: 59
End: 2019-03-12
Payer: COMMERCIAL

## 2019-03-12 ENCOUNTER — TELEPHONE (OUTPATIENT)
Dept: OPHTHALMOLOGY | Facility: CLINIC | Age: 59
End: 2019-03-12

## 2019-03-12 VITALS — SYSTOLIC BLOOD PRESSURE: 100 MMHG | DIASTOLIC BLOOD PRESSURE: 60 MMHG | OXYGEN SATURATION: 94 % | HEART RATE: 75 BPM

## 2019-03-12 DIAGNOSIS — G40.409 OTHER GENERALIZED EPILEPSY, NOT INTRACTABLE, WITHOUT STATUS EPILEPTICUS (H): ICD-10-CM

## 2019-03-12 DIAGNOSIS — G40.909 RECURRENT SEIZURES (H): Primary | ICD-10-CM

## 2019-03-12 LAB — SODIUM SERPL-SCNC: 140 MMOL/L (ref 133–144)

## 2019-03-12 RX ORDER — CARBAMAZEPINE 200 MG/1
CAPSULE, EXTENDED RELEASE ORAL
Qty: 60 CAPSULE | Refills: 11 | Status: SHIPPED | OUTPATIENT
Start: 2019-03-12 | End: 2020-03-06

## 2019-03-12 RX ORDER — LAMOTRIGINE 100 MG/1
100 TABLET ORAL 2 TIMES DAILY
Qty: 120 TABLET | Refills: 11 | Status: SHIPPED | OUTPATIENT
Start: 2019-03-12 | End: 2019-05-20

## 2019-03-12 RX ORDER — LEVETIRACETAM 1000 MG/1
TABLET ORAL
Qty: 180 TABLET | Refills: 11 | Status: SHIPPED | OUTPATIENT
Start: 2019-03-12 | End: 2020-03-03

## 2019-03-12 ASSESSMENT — PAIN SCALES - GENERAL: PAINLEVEL: NO PAIN (0)

## 2019-03-12 NOTE — TELEPHONE ENCOUNTER
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

## 2019-03-12 NOTE — NURSING NOTE
Chief Complaint   Patient presents with     RECHECK     [facility] RETURN - FOLLOW UP       Preventive Care:    Breast Cancer Screening: During our visit today, we discussed that it is recommended you receive breast cancer screening. Please call or make an appointment with your primary care provider to discuss this with them. You may also call the  [facility] scheduling line ([phone]) to set up a mammography appointment at the [facility] within the [facility].    Colorectal Cancer Screening: During our visit today, we discussed that it is recommended you receive colorectal cancer screening. Please call or make an appointment with your primary care provider to discuss this. You may also call the  [facility] scheduling line ([phone]) to set up a colonoscopy appointment.    Benji Singh, EMT

## 2019-03-12 NOTE — PROGRESS NOTES
[facility] epilepsy clinic note: Follow-up visit      Patient:  Magaly Renee  :  1960   Age:  58 year old   Today's Office Visit:  3/12/2019    Epilepsy Data:    2 seizure types    Type I: Generalized tonic-clonic seizures; began as a teenager, last occurring in  (see Dr. Eduardo's note on 18 for further detail)  Type I: Absence seizures; began as a teenager, typically occurring in clusters, and last occurring in  (see Dr. 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  Since the most recent clinic on 18 the patient has again remained seizure-free.  She had previously developed diplopia as a potential side effect of carbamazepine.  The medication has been reduced and the symptoms resolved 1 year ago.  No other side effects to carbamazepine, Keppra, or lamotrigine noted.  Overall the patient is happy with the control of her seizures.  Of note the patient did have a MRI brain in 2018 to evaluate for focal abnormalities that could attribute to her seizures.  Incidentally there was a focal enlargement of the left optic chiasm that was noted.  The patient was evaluated by neurosurgery in 2019 with plan for repeat MRI brain as well as ophthalmology consult.    Current Outpatient Medications   Medication Sig Dispense Refill     aspirin 325 MG tablet Take by mouth daily as needed for moderate pain       butalbital-acetaminophen-caffeine (FIORICET/ESGIC) -40 MG tablet Take 1 tablet by mouth every 4 hours as needed for headaches or moderate to severe pain 12 tablet 1     carBAMazepine (CARBATROL) 200 MG 12 hr capsule TAKE 2 CAPS IN AM AND 3 CAPS IN PM. (Patient taking differently: TAKE 1 CAPS IN AM AND 1 CAPS IN PM.) 150 capsule 1     cetirizine (ZYRTEC) 10 MG  tablet Take 1 tablet (10 mg) by mouth every morning (Needs follow-up appointment for this medication) (Patient taking differently: Take 10 mg by mouth daily as needed (Needs follow-up appointment for this medication)) 30 tablet 0     fluticasone (FLONASE) 50 MCG/ACT nasal spray Spray 2 sprays into both nostrils daily (Patient taking differently: Spray 2 sprays into both nostrils daily as needed ) 1 Package 1     lamoTRIgine (LAMICTAL) 100 MG tablet Take 2 tablets (200 mg) by mouth 2 times daily (or as directed) (Patient taking differently: Take 100 mg by mouth 2 times daily (or as directed)) 360 tablet 3     levETIRAcetam (KEPPRA) 1000 MG tablet Take 3 tablets (3,000 mg) by mouth 2 times daily 180 tablet 3     levETIRAcetam (KEPPRA) 1000 MG tablet Take 3 tablets (3000mg) two times daily. 180 tablet 1     butalbital-APAP-caffeine-codeine (FIORICET WITH CODEINE) -33-30 MG per capsule Take 1 capsule by mouth every 6 hours as needed for headaches (do not use more than 3 times per week) To be used as rescue medication if other meds do not work. (Patient not taking: Reported on 3/12/2019) 20 capsule 0     levETIRAcetam (KEPPRA) 500 MG tablet Take with 1000mg tablets for following taper:  2500mg in the morning and 3000mg in the evening for 2 weeks, then  2500mg twice daily for two weeks, then recheck labs. (Patient not taking: Reported on 12/11/2018) 60 tablet 0     olopatadine (PATANOL) 0.1 % ophthalmic solution INSTILL 1 DROP INTO BOTH EYES TWO TIMES A DAY AS NEEDED (Patient not taking: Reported on 3/12/2019) 5 mL 0      Perceived AED Side Effects:   Carbamazepine: Diplopia    Current AEDs:  Levetiracetam 3000 mg twice daily lamotrigine 100 mg twice daily  carbamazepine 200 mg twice daily    Review of Systems:  10 systems reviewed and negative as per HPI    Imaging:    MRI brain with and without contrast (12/18/18): 1. There is focal enlargement of the left optic chiasm without associated enhancement. These  "findings may represent a hamartoma or potentially low-grade glioma. Attention is recommended on follow-up  Imaging. 2. No cause for patient's seizures.    Procedures:     EEG studies:    Per Dr. Eduardo's Note on 12/11/18  \"Multiple interictal EEG recordings were interpreted as showing generalized spike wave discharges, but sometimes also as showing left parietal or other left-sided focal spike-wave complexes, with the earliest EEG report dating to 03/08/2001 at [facility].       The patient had a prolonged outpatient [place]2018[place] [facility] [place] showed a burst of approximately 3-per-second spike-wave discharges which was atypical due to variable absence of spikes in the presence of high amplitude slow waves and which also showed a single focal left centroparietotemporal spike-wave complex.\"    Exam:    /60 (BP Location: Right arm, Patient Position: Sitting, Cuff Size: Adult Regular)   Pulse 75   LMP 07/06/2016 (Approximate)   SpO2 94%      Wt Readings from Last 5 Encounters:   01/11/19 71.4 kg (157 lb 4.8 oz)   12/11/18 69.9 kg (154 lb 1.6 oz)   10/25/18 67.7 kg (149 lb 3.2 oz)   09/18/17 68.6 kg (151 lb 3.2 oz)   05/04/17 68 kg (150 lb)     Awake, alert, oriented to person place and time.  Answering questions appropriately.  Pupils equal round and reactive to light.  EOMI.  Visual fields full.  Facial movements symmetric.  No dysarthria.  No abnormal movements appreciated.  Strength 5/5 throughout.  Finger-nose-finger intact without dysmetria.  Normal casual gait.    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  Overall the patient's seizures have been well controlled with the last seizure occurring in 2016.  The patient is currently on 3 antiseizure medications including levetiracetam, carbamazepine, and lamotrigine.  After reviewing Dr. Eduardo's plan " from December 2019, it appeared he was going to try and optimize her medications based on the MRI results.  We will obtain levels today and have the patient follow-up with Dr. Eduardo for further optimization of her medications.  The patient has not yet scheduled repeat MRI for left optic chiasm abnormality or ophthalmology appointment.  We assisted the patient in this regard.  -Levetiracetam, carbamazepine, and lamotrigine levels  -Sodium level  -Follow-up with neurosurgery with repeat MRI brain  -Follow-up with neuro-ophthalmology  -Continue Keppra 3000 mg twice daily, carbamazepine 200 mg twice daily, and Lamictal 100 mg twice daily  -Follow-up with Dr. Eduardo in 1 year    As described above, I met with the patient for 30 minutes and during this time counseling was greater than 50% of the visit time.    Patient seen and discussed with Dr. Seven Ruiz MD  Neurology PGY3          Neurology Attending Note:    I have seen and examined the patient with the resident.  I have reviewed the labs and imaging results available.  I agree with the assessment and plan.    Jerald Amezcua MD

## 2019-03-12 NOTE — LETTER
3/12/2019       RE: Magaly Renee  [address] [place] [address]     Dear Colleague,    Thank you for referring your patient, Magaly Renee, to the [facility] NEUROLOGY at [facility]. Please see a copy of my visit note below.    [facility] epilepsy clinic note: Follow-up visit      Patient:  Magaly Renee  :  1960   Age:  58 year old   Today's Office Visit:  3/12/2019    Epilepsy Data:    2 seizure types    Type I: Generalized tonic-clonic seizures; began as a teenager, last occurring in  (see Dr. Eduardo's note on 18 for further detail)  Type I: Absence seizures; began as a teenager, typically occurring in clusters, and last occurring in  (see Dr. 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  Since the most recent clinic on 18 the patient has again remained seizure-free.  She had previously developed diplopia as a potential side effect of carbamazepine.  The medication has been reduced and the symptoms resolved 1 year ago.  No other side effects to carbamazepine, Keppra, or lamotrigine noted.  Overall the patient is happy with the control of her seizures.  Of note the patient did have a MRI brain in 2018 to evaluate for focal abnormalities that could attribute to her seizures.  Incidentally there was a focal enlargement of the left optic chiasm that was noted.  The patient was evaluated by neurosurgery in 2019 with plan for repeat MRI brain as well as ophthalmology consult.    Current Outpatient Medications   Medication Sig Dispense Refill     aspirin 325 MG tablet Take by mouth daily as needed for moderate pain       butalbital-acetaminophen-caffeine (FIORICET/ESGIC) -40 MG tablet Take 1 tablet by  mouth every 4 hours as needed for headaches or moderate to severe pain 12 tablet 1     carBAMazepine (CARBATROL) 200 MG 12 hr capsule TAKE 2 CAPS IN AM AND 3 CAPS IN PM. (Patient taking differently: TAKE 1 CAPS IN AM AND 1 CAPS IN PM.) 150 capsule 1     cetirizine (ZYRTEC) 10 MG tablet Take 1 tablet (10 mg) by mouth every morning (Needs follow-up appointment for this medication) (Patient taking differently: Take 10 mg by mouth daily as needed (Needs follow-up appointment for this medication)) 30 tablet 0     fluticasone (FLONASE) 50 MCG/ACT nasal spray Spray 2 sprays into both nostrils daily (Patient taking differently: Spray 2 sprays into both nostrils daily as needed ) 1 Package 1     lamoTRIgine (LAMICTAL) 100 MG tablet Take 2 tablets (200 mg) by mouth 2 times daily (or as directed) (Patient taking differently: Take 100 mg by mouth 2 times daily (or as directed)) 360 tablet 3     levETIRAcetam (KEPPRA) 1000 MG tablet Take 3 tablets (3,000 mg) by mouth 2 times daily 180 tablet 3     levETIRAcetam (KEPPRA) 1000 MG tablet Take 3 tablets (3000mg) two times daily. 180 tablet 1     butalbital-APAP-caffeine-codeine (FIORICET WITH CODEINE) -69-30 MG per capsule Take 1 capsule by mouth every 6 hours as needed for headaches (do not use more than 3 times per week) To be used as rescue medication if other meds do not work. (Patient not taking: Reported on 3/12/2019) 20 capsule 0     levETIRAcetam (KEPPRA) 500 MG tablet Take with 1000mg tablets for following taper:  2500mg in the morning and 3000mg in the evening for 2 weeks, then  2500mg twice daily for two weeks, then recheck labs. (Patient not taking: Reported on 12/11/2018) 60 tablet 0     olopatadine (PATANOL) 0.1 % ophthalmic solution INSTILL 1 DROP INTO BOTH EYES TWO TIMES A DAY AS NEEDED (Patient not taking: Reported on 3/12/2019) 5 mL 0      Perceived AED Side Effects:   Carbamazepine: Diplopia    Current AEDs:  Levetiracetam 3000 mg twice daily lamotrigine 100  "mg twice daily  carbamazepine 200 mg twice daily    Review of Systems:  10 systems reviewed and negative as per HPI    Imaging:    MRI brain with and without contrast (12/18/18): 1. There is focal enlargement of the left optic chiasm without associated enhancement. These findings may represent a hamartoma or potentially low-grade glioma. Attention is recommended on follow-up  Imaging. 2. No cause for patient's seizures.    Procedures:     EEG studies:    Per Dr. Eduardo's Note on 12/11/18  \"Multiple interictal EEG recordings were interpreted as showing generalized spike wave discharges, but sometimes also as showing left parietal or other left-sided focal spike-wave complexes, with the earliest EEG report dating to 03/08/2001 at [facility].       The patient had a prolonged outpatient video EEG on 12/11/2018, at [place] [facility] [place], which showed a burst of approximately 3-per-second spike-wave discharges which was atypical due to variable absence of spikes in the presence of high amplitude slow waves and which also showed a single focal left centroparietotemporal spike-wave complex.\"    Exam:    /60 (BP Location: Right arm, Patient Position: Sitting, Cuff Size: Adult Regular)   Pulse 75   LMP 07/06/2016 (Approximate)   SpO2 94%      Wt Readings from Last 5 Encounters:   01/11/19 71.4 kg (157 lb 4.8 oz)   12/11/18 69.9 kg (154 lb 1.6 oz)   10/25/18 67.7 kg (149 lb 3.2 oz)   09/18/17 68.6 kg (151 lb 3.2 oz)   05/04/17 68 kg (150 lb)     Awake, alert, oriented to person place and time.  Answering questions appropriately.  Pupils equal round and reactive to light.  EOMI.  Visual fields full.  Facial movements symmetric.  No dysarthria.  No abnormal movements appreciated.  Strength 5/5 throughout.  Finger-nose-finger intact without dysmetria.  Normal casual gait.    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  Overall the patient's seizures have been well controlled with the last seizure occurring in 2016.  The patient is currently on 3 antiseizure medications including levetiracetam, carbamazepine, and lamotrigine.  After reviewing Dr. Eduardo's plan from December 2019, it appeared he was going to try and optimize her medications based on the MRI results.  We will obtain levels today and have the patient follow-up with Dr. Eduardo for further optimization of her medications.  The patient has not yet scheduled repeat MRI for left optic chiasm abnormality or ophthalmology appointment.  We assisted the patient in this regard.  -Levetiracetam, carbamazepine, and lamotrigine levels  -Sodium level  -Follow-up with neurosurgery with repeat MRI brain  -Follow-up with neuro-ophthalmology  -Continue Keppra 3000 mg twice daily, carbamazepine 200 mg twice daily, and Lamictal 100 mg twice daily  -Follow-up with Dr. Eduardo in 1 year    As described above, I met with the patient for 30 minutes and during this time counseling was greater than 50% of the visit time.    Patient seen and discussed with Dr. Seven Ruiz MD  Neurology PGY3      Neurology Attending Note:    I have seen and examined the patient with the resident.  I have reviewed the labs and imaging results available.  I agree with the assessment and plan.    Jerald Amezcua MD    Again, thank you for allowing me to participate in the care of your patient.      Sincerely,    Yassine Eduardo MD

## 2019-03-13 ENCOUNTER — TELEPHONE (OUTPATIENT)
Dept: NEUROLOGY | Facility: CLINIC | Age: 59
End: 2019-03-13

## 2019-03-13 NOTE — TELEPHONE ENCOUNTER
DMV Form received from patient who brought it along to appointment.  DMV Form Completed and placed in MD folder for signature.    Call placed to patient to resolve issue about time of last seizure ( incorrectly recorded on form due to this writer's error ).  Patient confirms date of 7/13/16 which is corrected on DMV Form.    Placed in MD folder once again.

## 2019-03-14 LAB
LAMOTRIGINE SERPL-MCNC: 1.3 UG/ML (ref 2.5–15)
LEVETIRACETAM SERPL-MCNC: 26 UG/ML (ref 12–46)

## 2019-03-16 LAB
CARBAMAZEPINE EP FREE SERPL-MCNC: 0.4 UG/ML
CARBAMAZEPINE EP SERPL-MCNC: 1.1 UG/ML
CARBAMAZEPINE FREE SERPL-MCNC: 1 UG/ML
CARBAMAZEPINE SERPL-MCNC: 5.3 UG/ML

## 2019-03-25 ENCOUNTER — ALLIED HEALTH/NURSE VISIT (OUTPATIENT)
Dept: FAMILY MEDICINE | Facility: CLINIC | Age: 59
End: 2019-03-25
Payer: COMMERCIAL

## 2019-03-25 DIAGNOSIS — S82.899A ANKLE FRACTURE: Primary | ICD-10-CM

## 2019-03-25 PROCEDURE — 99207 ZZC NO CHARGE NURSE ONLY: CPT
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
## 2019-03-26 ENCOUNTER — ANCILLARY PROCEDURE (OUTPATIENT)
Dept: MRI IMAGING | Facility: CLINIC | Age: 59
End: 2019-03-26
Attending: NEUROLOGICAL SURGERY
Payer: COMMERCIAL

## 2019-03-26 DIAGNOSIS — D43.2 NEOPLASM OF UNCERTAIN BEHAVIOR OF BRAIN AND SPINAL CORD (H): ICD-10-CM

## 2019-03-26 DIAGNOSIS — D43.4 NEOPLASM OF UNCERTAIN BEHAVIOR OF BRAIN AND SPINAL CORD (H): ICD-10-CM

## 2019-03-26 RX ORDER — GADOBUTROL 604.72 MG/ML
7.5 INJECTION INTRAVENOUS ONCE
Status: COMPLETED | OUTPATIENT
Start: 2019-03-26 | End: 2019-03-26

## 2019-03-26 RX ADMIN — GADOBUTROL 7.5 ML: 604.72 INJECTION INTRAVENOUS at 08:25

## 2019-03-26 NOTE — DISCHARGE INSTRUCTIONS
MRI Contrast Discharge Instructions    The IV contrast you received today will pass out of your body in your  urine. This will happen in the next 24 hours. You will not feel this process.  Your urine will not change color.    Drink at least 4 extra glasses of water or juice today (unless your doctor  has restricted your fluids). This reduces the stress on your kidneys.  You may take your regular medicines.    If you are on dialysis: It is best to have dialysis today.    If you have a reaction: Most reactions happen right away. If you have  any new symptoms after leaving the hospital (such as hives or swelling),  call your hospital at the correct number below. Or call your family doctor.  If you have breathing distress or wheezing, call 911.    Special instructions: ***    I have read and understand the above information.    Signature:______________________________________ Date:___________    Staff:__________________________________________ Date:___________     Time:__________    [facility] Departments:    ___[place]: [phone]  ___[facility]: [phone]  ___[place]: [phone] ___[facility]: [phone]  ___[facility]: [phone]  ___[facility]: [phone]  ___[phone]  ___[facility]: [phone]  ___[phone]

## 2019-03-27 ENCOUNTER — OFFICE VISIT (OUTPATIENT)
Dept: FAMILY MEDICINE | Facility: CLINIC | Age: 59
End: 2019-03-27
Payer: COMMERCIAL

## 2019-03-27 ENCOUNTER — ANCILLARY PROCEDURE (OUTPATIENT)
Dept: GENERAL RADIOLOGY | Facility: CLINIC | Age: 59
End: 2019-03-27
Attending: NURSE PRACTITIONER
Payer: COMMERCIAL

## 2019-03-27 VITALS
WEIGHT: 165.3 LBS | BODY MASS INDEX: 26.57 KG/M2 | TEMPERATURE: 96.8 F | DIASTOLIC BLOOD PRESSURE: 68 MMHG | HEIGHT: 66 IN | HEART RATE: 71 BPM | SYSTOLIC BLOOD PRESSURE: 96 MMHG | OXYGEN SATURATION: 100 % | RESPIRATION RATE: 16 BRPM

## 2019-03-27 DIAGNOSIS — S82.891A ANKLE FRACTURE, RIGHT, CLOSED, INITIAL ENCOUNTER: Primary | ICD-10-CM

## 2019-03-27 DIAGNOSIS — M25.571 PAIN IN JOINT, ANKLE AND FOOT, RIGHT: ICD-10-CM

## 2019-03-27 PROCEDURE — 73610 X-RAY EXAM OF ANKLE: CPT | Mod: RT

## 2019-03-27 PROCEDURE — 99213 OFFICE O/P EST LOW 20 MIN: CPT | Performed by: NURSE PRACTITIONER

## 2019-03-27 RX ORDER — HYDROCODONE BITARTRATE AND ACETAMINOPHEN 5; 325 MG/1; MG/1
1 TABLET ORAL EVERY 4 HOURS PRN
Qty: 18 TABLET | Refills: 0 | Status: SHIPPED | OUTPATIENT
Start: 2019-03-27 | End: 2019-03-30

## 2019-03-27 ASSESSMENT — MIFFLIN-ST. JEOR: SCORE: 1348.38
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
## 2019-03-27 NOTE — PATIENT INSTRUCTIONS
Patient Education     Ankle Fracture, Distal Fibula  You have a fracture, or broken bone, of the end of the fibula bone. The fibula is one of two bones that support the ankle joint.    Home care    You will be given a splint, cast, or special boot to prevent movement at the injury site. Do not put weight on a splint. It will break. Follow your healthcare provider s advice about when to begin bearing weight on a cast or boot.    Keep your leg raised when sitting or lying down. When sleeping, place a pillow under the injured leg. When sitting, support the injured leg so it is above heart level. This is very important during the first 48 hours.    Keep the cast or splint completely dry at all times. When bathing, protect the cast or splint with 2 large plastic bags. Place 1 bag outside of the other. Tape each bag with duct tape at the top end or use rubber bands. Water can still leak in even when the foot is covered. So it's best to keep the cast, splint, or boot away from water. If a fiberglass cast or splint gets wet, dry it with a hair dryer on a cool setting.    Place an ice pack over the injured area for no more than 15 to 20 minutes. Do this every 3 to 6 hours for the first 24 to 48 hours. Continue this 3 to 4 times a day as needed. To make an ice pack, put ice cubes in a plastic bag that seals at the top. Wrap the bag in a clean, thin towel or cloth. Never put ice or an ice pack directly on the skin. The ice pack can be put right on the cast or splint. As the ice melts, be careful that the cast or splint doesn t get wet.    You may use over-the-counter pain medicine to control pain, unless another pain medicine was prescribed. If you have chronic liver or kidney disease or ever had a stomach ulcer or GI bleeding, talk with your provider before using these medicines.  Follow-up care  Follow up with your healthcare provider in 1 week, or as advised. This is to be sure the bone is healing properly. If you were  given a splint, it may be changed to a cast or boot after the swelling goes down.  If X-rays were taken, you will be told of any new findings that may affect your care.  When to seek medical advice  Call your healthcare provider right away if any of these occur:    The plaster cast or splint becomes wet or soft    The fiberglass cast or splint stays wet for more than 24 hours    There is increased tightness or pain under the cast or splint    Your toes become swollen, cold, blue, numb, or tingly    The cast or splint becomes loose    The cast or splint has a bad smell    Sore areas develop under the cast or splint    The cast or splint develops cracks or breaks   Date Last Reviewed: 4/1/2018 2000-2018 [employer]. [address] [place] [address], [place] [address]. All rights reserved. This information is not intended as a substitute for professional medical care. Always follow your healthcare professional's instructions.

## 2019-04-01 ENCOUNTER — TRANSFERRED RECORDS (OUTPATIENT)
Dept: HEALTH INFORMATION MANAGEMENT | Facility: CLINIC | Age: 59
End: 2019-04-01

## 2019-04-09 ENCOUNTER — OFFICE VISIT (OUTPATIENT)
Dept: OPHTHALMOLOGY | Facility: CLINIC | Age: 59
End: 2019-04-09
Attending: OPHTHALMOLOGY
Payer: COMMERCIAL

## 2019-04-09 DIAGNOSIS — H53.10 SUBJECTIVE VISUAL DISTURBANCE: ICD-10-CM

## 2019-04-09 DIAGNOSIS — H47.49 DISORDER OF OPTIC CHIASM: Primary | ICD-10-CM

## 2019-04-09 DIAGNOSIS — H04.123 DRY EYE SYNDROME OF BOTH EYES: ICD-10-CM

## 2019-04-09 DIAGNOSIS — H53.10 SUBJECTIVE VISUAL DISTURBANCE: Primary | ICD-10-CM

## 2019-04-09 PROCEDURE — 92133 CPTRZD OPH DX IMG PST SGM ON: CPT | Mod: ZF | Performed by: OPHTHALMOLOGY

## 2019-04-09 PROCEDURE — G0463 HOSPITAL OUTPT CLINIC VISIT: HCPCS | Mod: ZF

## 2019-04-09 PROCEDURE — 92083 EXTENDED VISUAL FIELD XM: CPT | Mod: ZF | Performed by: OPHTHALMOLOGY

## 2019-04-09 ASSESSMENT — SLIT LAMP EXAM - LIDS
COMMENTS: BLEPHARITIS
COMMENTS: BLEPHARITIS

## 2019-04-09 ASSESSMENT — REFRACTION_WEARINGRX
OD_AXIS: 021
OD_ADD: +2.50
OS_SPHERE: +1.00
OD_SPHERE: +0.75
OS_CYLINDER: +0.50
OD_CYLINDER: +0.25
OS_ADD: +2.50
OS_AXIS: 153

## 2019-04-09 ASSESSMENT — TONOMETRY
OS_IOP_MMHG: 15
OD_IOP_MMHG: 16
IOP_METHOD: ICARE

## 2019-04-09 ASSESSMENT — CUP TO DISC RATIO
OD_RATIO: 0.4
OS_RATIO: 0.5

## 2019-04-09 ASSESSMENT — VISUAL ACUITY
METHOD: SNELLEN - LINEAR
CORRECTION_TYPE: GLASSES
OD_CC: 20/20
OS_CC: 20/20

## 2019-04-09 NOTE — NURSING NOTE
No chief complaint on file.    Chief Complaint(s) and History of Present Illness(es)     58 F who underwent an MRI on 12/18/2018 as part of the work-up for her epilepsy. 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    No vision concerns.     Yuri Desouza CO 8:32 AM April 9, 2019

## 2019-04-09 NOTE — LETTER
2019         RE:  :  MRN: Magaly Renee  1960  [id number]     Dear Dr. Ruiz,    Thank you for asking me to see your very pleasant patient, Magaly Renee, in neuro-ophthalmic consultation.  I would like to thank you for sending your records and I have summarized them in the history of present illness. She presented with her spouse who provided additional history.  My assessment and plan are below.  For further details, please see my attached clinic note.         Assessment & Plan     Magaly Renee is a 58 year old female with the following diagnoses:   1. Disorder of optic chiasm    2. Subjective visual disturbance    3. Dry eye syndrome of both eyes       Patient here for evaluation of left chiasmal enlargement.   Diagnosed with Epilepsy at age 12. Previous MRI in approximately  and no abnormality was noted.  She recently started doctoring with a new neurologist who wanted a new MRI in 2018 due to epilepsy. She denies vision symptoms.  No known history of neurofibromatosis.  She denies subcutaneous lumps, cafe au lait spots, or family history.      Visual acuity 20/20 both eyes.  Color vision normal both eyes.  Pupils normal with no afferent pupillary defect both eyes.  Anterior segment exam overall normal with no Lisch nodules.   Fundus exam shows mild pallor left eye.  Visual field normal both eyes.  Optical coherence tomography mild superotemporal thinning which is borderline.      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  It is not impacting her vision and her exam appears overall normal. I personally reviewed patient's MRI and shows enlargement of the left side of the chiasm that may show peripheral enhancement.  We discussed that commonly this is associated with neurofibromatosis, but patient has no known characteristic features of this.  Follow up in 1 year and sooner if worsening of symptoms.      Patient also has Dry eye syndrome  both eyes. I asked the patient to use artificial tears as well as warm compresses to the eyelid margin  on a regular basis.  I asked the patient to take fish oil capsules 2x/day for a month and then 1x/d thereafter.  I also started her on Xiidra twice a day.  If that is not beneficial we could consider punctal plugs, corticosteroid eye drops and Restasis.     Again, thank you for allowing me to participate in the care of your patient.      Sincerely,    Leonardo Cruz MD  Professor  Ophthalmology Residency   Director of Neuro-Ophthalmology  [employer] - Scheie Endow Chair  Departments of Ophthalmology, Neurology, and Neurosurgery  [address]  [address]  T - [phone]  F - [phone]  FAUSTO [email or web address]      CC: Harshal Ruiz MD  [place] [id number]  VIA In Basket     [facility]  [address] [facility] [address]  VIA Facsimile: [phone]     Madelyn Curry PA-C  [address] [facility] [address]  VIA In Basket       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

## 2019-04-09 NOTE — PROGRESS NOTES
Assessment & Plan     Magaly Renee is a 58 year old female with the following diagnoses:   1. Disorder of optic chiasm    2. Subjective visual disturbance    3. Dry eye syndrome of both eyes       Patient here for evaluation of left chiasmal enlargement.   Diagnosed with Epilepsy at age 12. Previous MRI in approximately 2000 and no abnormality was noted.  She recently started doctoring with a new neurologist who wanted a new MRI in December 2018 due to epilepsy. She denies vision symptoms.  No known history of neurofibromatosis.  She denies subcutaneous lumps, cafe au lait spots, or family history.      Visual acuity 20/20 both eyes.  Color vision normal both eyes.  Pupils normal with no afferent pupillary defect both eyes.  Anterior segment exam overall normal with no Lisch nodules.   Fundus exam shows mild pallor left eye.  Visual field normal both eyes.  Optical coherence tomography mild superotemporal thinning which is borderline.      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  It is not impacting her vision and her exam appears overall normal. I personally reviewed patient's MRI and shows enlargement of the left side of the chiasm that may show peripheral enhancement.  We discussed that commonly this is associated with neurofibromatosis, but patient has no known characteristic features of this.  Follow up in 1 year and sooner if worsening of symptoms.      Patient also has Dry eye syndrome both eyes. I asked the patient to use artificial tears as well as warm compresses to the eyelid margin  on a regular basis.  I asked the patient to take fish oil capsules 2x/day for a month and then 1x/d thereafter.  I also started her on Xiidra twice a day.  If that is not beneficial we could consider punctal plugs, corticosteroid eye drops and Restasis.                Attending Physician Attestation:  Complete documentation of historical and exam elements from today's encounter  can be found in the full encounter summary report (not reduplicated in this progress note).  I personally obtained the chief complaint(s) and history of present illness.  I confirmed and edited as necessary the review of systems, past medical/surgical history, family history, social history, and examination findings as documented by others; and I examined the patient myself.  I personally reviewed the relevant tests, images, and reports as documented above.  I formulated and edited as necessary the assessment and plan and discussed the findings and management plan with the patient and family. - Leonardo Cruz MD

## 2019-04-09 NOTE — LETTER
2019         RE:  :  MRN: Magaly Renee  1960  [id number]     Dear Dr. Harshal Monterroso*,    Thank you for asking me to see your very pleasant patient, Magaly Renee, in neuro-ophthalmic consultation.  I would like to thank you for sending your records and I have summarized them in the history of present illness. She presented with her {Family Member:[id number]} who provided additional history.  My assessment and plan are below.  For further details, please see my attached clinic note.           Assessment & Plan     Magaly Renee is a 58 year old female with the following diagnoses:   1. Subjective visual disturbance       Patient here for evaluation of bump on chiasm.  Noticed on MRI in 2018 due to epilepsy.  Diagnosed with Epilepsy at age 12. Previous MRI in approximately  and not noted atNo known history of neurofibromatosis.  No known characteristics suggestive of neurofibromatosis.  Denies problems with her vision.      Visual acuity 20/20 both eyes.  Color vision normal both eyes.  Pupils normal with no afferent pupillary defect both eyes.  Anterior segment exam overall normal with no Lisch nodules.       I personally reviewed patient's MRI and shows glioma.  We discussed that commonly this is associated with neurofibromatosis, but patient has no known characteristic features of this.      Patient also has Dry eye syndrome both eyes. I have asked  The patient to use artificial tears.  If no improvement in symptoms could consider Xiidra.            Again, thank you for allowing me to participate in the care of your patient.      Sincerely,    Leonardo Cruz MD  Professor  Ophthalmology Residency   Director of Neuro-Ophthalmology  [employer] - [employer] Endowed Chair  Departments of Ophthalmology, Neurology, and Neurosurgery  [facility] [address] [place]  [address]  T - [phone]   - [phone]  E -  [email or web address]      CC: Harshal Ruiz MD  [facility]  [address] [facility] [address]  [facility] [address]  VIA In Basket     [facility]  [address] [address] [facility] [address] [address]  VIA Facsimile: [phone]     Madelyn Curry PA-C  [address]  [facility] [address]  VIA In Basket       DX = ***

## 2019-04-26 ENCOUNTER — TELEPHONE (OUTPATIENT)
Dept: NEUROLOGY | Facility: CLINIC | Age: 59
End: 2019-04-26

## 2019-04-26 NOTE — TELEPHONE ENCOUNTER
Health Call Center    Phone Message    May a detailed message be left on voicemail: yes    Reason for Call: Other: Pt calling to see if Dr. Eduardo has an old MRI of hers. She thinks it was done sometime between [id number]. Please give pt a call back to advise.      Action Taken: Message routed to:  [facility] ([facility] Neurology

## 2019-04-29 ENCOUNTER — TRANSFERRED RECORDS (OUTPATIENT)
Dept: HEALTH INFORMATION MANAGEMENT | Facility: CLINIC | Age: 59
End: 2019-04-29

## 2019-05-02 NOTE — TELEPHONE ENCOUNTER
Nurse checked PACS to investigate for older MRIs.  Only images available are from 12/18 and 3/19 which patient was aware of.  Called patient back to indicate inability to find older imaging.  Patient understanding.  No further questions.

## 2019-05-06 ENCOUNTER — TELEPHONE (OUTPATIENT)
Dept: OPHTHALMOLOGY | Facility: CLINIC | Age: 59
End: 2019-05-06

## 2019-05-06 DIAGNOSIS — G43.009 MIGRAINE WITHOUT AURA AND WITHOUT STATUS MIGRAINOSUS, NOT INTRACTABLE: ICD-10-CM

## 2019-05-06 RX ORDER — BUTALBITAL, ACETAMINOPHEN AND CAFFEINE 50; 325; 40 MG/1; MG/1; MG/1
1 TABLET ORAL EVERY 4 HOURS PRN
Qty: 12 TABLET | Refills: 1 | Status: SHIPPED | OUTPATIENT
Start: 2019-05-06 | End: 2019-07-15

## 2019-05-06 NOTE — TELEPHONE ENCOUNTER
The patient called wondering if a PA has been started.  Message sent to PA team.  The patient would like to try the Xiidra as her first option if possible.

## 2019-05-06 NOTE — TELEPHONE ENCOUNTER
Central Prior Authorization Team   Phone: [phone]    PA Initiation    Medication: lifitegrast (XIIDRA) 5 % opthalmic solution  Insurance Company: [employer] ([facility]) - Phone [phone] Fax [phone]  Pharmacy Filling the Rx: [id number] - [place] [address]  Filling Pharmacy Phone: [phone]  Filling Pharmacy Fax:    Start Date: 5/6/2019

## 2019-05-06 NOTE — TELEPHONE ENCOUNTER
Health Call Center    Phone Message    May a detailed message be left on voicemail: yes    Reason for Call: Medication Question or concern regarding medication   Prescription Clarification  Name of Medication: lifitegrast (XIIDRA) 5 % opthalmic solution    Prescribing Provider: Leonardo Cruz MD   Pharmacy: [id number] [place] [address]   What on the order needs clarification? Pt is concerned because their Rx is still not available at pharmacy.  Pharmacy is still waiting for PA.  Please investigate and call Pt.          Action Taken: Message routed to:  [facility] ([facility] eye clinic

## 2019-05-06 NOTE — TELEPHONE ENCOUNTER
Prescription faxed to [facility].  Transmission confirmed via Right Fax.   Nahomy THRASHER-BERTHA

## 2019-05-07 NOTE — TELEPHONE ENCOUNTER
Prior Authorization Approval    Authorization Effective Date: 5/6/2019  Authorization Expiration Date: 5/6/2020  Medication: lifitegrast (XIIDRA) 5 % opthalmic solution - P/A APPROVED  Approved Dose/Quantity:   Reference #: [id number]   Insurance Company: [employer] ([facility]) - Phone [phone] Fax [phone]  Expected CoPay:       CoPay Card Available:      Foundation Assistance Needed:    Which Pharmacy is filling the prescription (Not needed for infusion/clinic administered): [id number] - [place] [address]  Pharmacy Notified: Yes - via voicemail  Patient Notified:

## 2019-05-20 DIAGNOSIS — G40.409 OTHER GENERALIZED EPILEPSY, NOT INTRACTABLE, WITHOUT STATUS EPILEPTICUS (H): ICD-10-CM

## 2019-05-20 RX ORDER — LAMOTRIGINE 100 MG/1
100 TABLET ORAL 2 TIMES DAILY
Qty: 120 TABLET | Refills: 5 | Status: SHIPPED | OUTPATIENT
Start: 2019-05-20 | End: 2019-06-04 | Stop reason: DRUGHIGH

## 2019-05-20 NOTE — TELEPHONE ENCOUNTER
LOV 3/12/19 Medication refilled per [facility] RN protocol.    Berkley COREA RN BSN PHN  Specialty Clinics

## 2019-06-04 ENCOUNTER — OFFICE VISIT (OUTPATIENT)
Dept: NEUROLOGY | Facility: CLINIC | Age: 59
End: 2019-06-04
Payer: COMMERCIAL

## 2019-06-04 VITALS
TEMPERATURE: 97.9 F | HEART RATE: 71 BPM | OXYGEN SATURATION: 95 % | SYSTOLIC BLOOD PRESSURE: 98 MMHG | WEIGHT: 150.8 LBS | DIASTOLIC BLOOD PRESSURE: 61 MMHG | BODY MASS INDEX: 24.23 KG/M2 | HEIGHT: 66 IN | RESPIRATION RATE: 16 BRPM

## 2019-06-04 DIAGNOSIS — G40.319 INTRACTABLE GENERALIZED EPILEPSY (H): Primary | ICD-10-CM

## 2019-06-04 DIAGNOSIS — G40.409 OTHER GENERALIZED EPILEPSY, NOT INTRACTABLE, WITHOUT STATUS EPILEPTICUS (H): ICD-10-CM

## 2019-06-04 DIAGNOSIS — G40.319 INTRACTABLE GENERALIZED EPILEPSY (H): ICD-10-CM

## 2019-06-04 RX ORDER — LAMOTRIGINE 100 MG/1
TABLET ORAL
Qty: 90 TABLET | Refills: 11 | Status: SHIPPED | OUTPATIENT
Start: 2019-06-04 | End: 2020-03-03

## 2019-06-04 ASSESSMENT — PAIN SCALES - GENERAL: PAINLEVEL: NO PAIN (0)

## 2019-06-04 ASSESSMENT — MIFFLIN-ST. JEOR: SCORE: 1282.39

## 2019-06-04 NOTE — LETTER
RE: Magaly Renee  [address] [place] [address]     Dear Colleague,    Thank you for referring your patient, Magaly Renee, to the [facility] NEUROLOGY at [facility]. Please see a copy of my visit note below.        [facility]:  RETURN VISIT          Service Date: 06/04/2019      HISTORY:  Ms. Magaly Renee is a 58-year-old, right-handed woman who returned for follow-up of idiopathic generalized epilepsy with absence seizures and generalized tonic-clonic seizures.  She came to the visit today with her .      The patient reported that she has not had any seizures following the most recent visit to this clinic on 12/11/2018.  The most recent grand mal seizure occurred in 2016.  The most recent absence seizure occurred approximately in 2013.      She has been having intermittent imbalance and diplopia occurring at about an hour after the morning AED doses for about an hour on several days each month.      Ictal semiology-history:   The patient reported that she only ever had 2 types of seizures in her lifetime, as grand mal seizures and absence seizures.      The grand mal seizures first occurred at 14 years of age.  She thinks that she probably had 8-12 total grand mal seizures widely spread out over the years, usually with 3-year intervals or longer between seizures.  The most recent of these have occurred in 2016.  These could arise from sleep or waking.  When awake, the patient never had any sort of aura or prodrome, but simply would find herself postictally down on the floor or ground with standing at onset with a severe headache, confusion and occasionally evidence of tongue biting or urinary incontinence.  Her  saw several of these events that arose from sleep in bed at night, described as rigid trunk and leg extension with arm flexion up to the chest and rhythmic generalized jerking with complete  unresponsiveness, lasting about 1 minute.  He often noted confused postictal behaviors for 15 minutes or longer after a seizure.      The absence seizures probably began sometime in the early teens.  She often had clusters with multiple absences on the same day, but with many days or even months between seizures, as recently as a decade ago.  The episodes since have declined in frequency and the last of these was witnessed, perhaps 5 years ago.  The patient herself would usually have no idea that one had occurred.  She never had any aura or postictal state with these.  Witnesses would simply note that she suddenly stopped talking and stopped moving, with no response to voice or touch, but with eyes open.  She would not fall down or change posture during these brief seizures.  Her  thought that these consistently lasted less than 20 seconds, and he did not witness any postictal state.      The patient denied that she has ever had a paroxysmal episode of sudden brief confusion, non-convulsive falling with unconsciousness, repetitive involuntary movements or posturing, or other paroxysmal phenomena.  She denied any history of convulsive status epilepticus, or complex partial status epilepticus.  She denied any history of sudden involuntary jerks while fully awake, but she has had hypnic jerks.      The patient does not recognize exacerbating or remitting factors with regard to seizure frequency.      Epilepsy-seizure predispositions:   The patient has no family history of epilepsy or seizures.  She has no history of gestational or  injury, febrile convulsions, developmental delay, stroke, meningitis, encephalitis, significant head injury, or other epileptic predispositions.  She denied a history of physical or sexual abuse by an adult during her childhood or adulthood.      Laboratory evaluations:   Scanned records from [facility] indicate that a normal brain MRI was obtained on  05/25/2001.    Multiple interictal EEG recordings were interpreted as showing generalized spike wave discharges, but sometimes also as showing left parietal or other left-sided focal spike-wave complexes, with the earliest EEG report dating to 03/08/2001 at [facility].      The patient had a prolonged outpatient video EEG on 12/11/2018, at the [facility], which showed a burst of approximately 3-per-second spike-wave discharges which was atypical due to variable absence of spikes in the presence of high amplitude slow waves and which also showed a single focal left centroparietotemporal spike-wave complex.     A brain MRI detected no cerebral abnormalities, but showed focal enlargement of the left optic chiasm without associated enhancement, at [facility] on 12/18/2018.    Epilepsy therapeutics:   The patient remembers that initially after her first grand mal seizure, she was started directly on phenytoin and phenobarbital, which she found highly sedating.  Over the years she was converted to various combinations of clonazepam, carbamazepine, lamotrigine and levetiracetam.  For the last approximately 10 years, she has been on a combination of levetiracetam, lamotrigine and carbamazepine with intermittent periods of blurry or double vision lasting several hours.  These improved when she began to take her medications with meals, but she still has a severe episode at least once per month.      Other history:   The patient began having migraine headaches in her 30S, which she usually were left retro-orbital or right retro-orbital in alternation.  These were not associated with nausea, vomiting, photophobia or phonophobia.  They might last many hours if untreated, but they are markedly aborted with the use of Fioricet.  They have improved over the years and currently are occurring about once per month or less.     PAST MEDICAL-SURGICAL HISTORY:    1.  History of idiopathic generalized epilepsy with absence seizures and generalized  tonic-clonic seizures with atypical interictal EEG abnormalities.     2.  Chronic migraine headaches.   3.  Status post  section.     FAMILY HISTORY:  There is no family history of seizures or epilepsy.  Her mother had recurrent migraine headaches, but there is no other history of neurologic disease in the family.        PERSONAL AND SOCIAL HISTORY:  The patient grew up in [place].  She completed her education with a bachelor's degree in mathematics at [employer]  She lives with her  and the younger 2 of their 3 sons.  She previously worked in business, primarily accounting.     She does not use alcohol, tobacco or illicit recreational drugs        REVIEW OF SYSTEMS:  The patient denied history of attention and memory disturbance, difficulties with comprehension and expression, difficulty in solving problems, weakness, tremors or other abnormal involuntary movements, numbness or tingling, incoordination, falling or difficulty in walking, urinary or fecal incontinence, headache and other pain, except as described above.  The patient denied any history of severe depression or suicidal ideation, severe anxiety, panic attacks, hallucinations, delusions, psychosis, substance abuse, or psychiatric hospitalization.  She denied rashes and easy bruising.  The remainder of a 14-system symptom review was negative except as noted above.       MEDICATIONS:  Levetiracetam 3000 mg b.i.d., lamotrigine 100/200 mg daily, carbamazepine extended release 200 mg b.i.d., and other medications as per the electronic medical record.     PHYSICAL EXAMINATION:    On examination, the patient was an alert woman in no apparent distress.  Pulse was 90 per minute.  Blood pressure was 117/58.  Respirations were 18 per minute.  Height was 66 inches, weight was 154 pounds.  Skull was normocephalic and atraumatic.  Neck was supple, without signs of meningeal irritation.      On neurological examination the patient appeared alert  and was fully oriented to person, place, time, and reason for visit.  Speech showed normal articulation, fluency, repetitions, naming, syntax and comprehension.  She accurately repeated digits sequences, repeating 8 forward and 5 reversed.  At 10 minutes from presentation, 4 of 4 phrases were recalled.  No apraxias or atavistic signs were elicited.  Fundoscopy was normal bilaterally.  Cranial nerves III through XII were normal.  Muscle masses, tones and strengths were normal throughout.  There was no pronator drift.  Sequential fine finger movements were performed normally with each hand.  No spontaneous tremors, myoclonus, or other abnormal movements were observed.  Sensations of light touch, pin prick, vibration and proprioception were reportedly normal throughout.  The rapid alternating movements, and finger-nose-finger and heel-shin maneuvers were performed normally bilaterally.  Romberg maneuver was negative.  Regular, heel, toe, tandem and reverse tandem walking were normal.  Deep tendon reflexes were normal and symmetric throughout.  Toes were downgoing bilaterally.      IMPRESSION:    The patient has had full seizure control for a number of years.  She is having intermittent adverse effects that are more characteristic of carbamazepine and lamotrigine AEs than of levetiracetam.  Her epilepsy syndrome generally is least responsive to carbamazepine, among the 3 agents.  I spent a considerable amount of time discussing with her reasons to consider tapering off carbamazepine, if levels of the other two AEDs are adequate.      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  She saw Dr. Luke Molina in Neurosurgery, who recommended close follow up with serial imaging.  The non-enhancing abnormality was reported to be unchanged on an MRI on 03/26/2019.      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  She strongly denied any history of myoclonus.  In addition to generalized spike-wave discharges, several electroencephalograms have shown left parietal or other left hemispheric spike-wave complexes.  Possibly she has atypical interictal electroencephalographic abnormalities and idiopathic generalized epilepsy, although another possibility would be a partial epilepsy with secondary bilateral synchrony, causing complex partial and secondarily generalized tonic-clonic seizures.  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       The patient has good migraine control, which she will follow with Dr. Shayy Sands.        The patient herself noted that she would not drive for 3 months if seizures recurred.  I reviewed [place] regulations on seizures and driving with the patient.  She appeared to clearly understand that she is prohibited from operating a motor vehicle within 3 months following any seizure or other episode with sudden unconsciousness or inability to sit up, and that she is required to report any future such seizure to the [facility] within 30 days after the event.  I also recommended that she and her family review all of her other activities, and avoid any activities that might lead to self-injury or injury of others, within 3 months following any seizure with impaired awareness or impaired motor control.  Such activities include but are not limited to holding babies or young children at heights from which they might be injured if dropped, bathing infants or young children in situations in which they might drown without continuous interactive care by an adult who is fully capable at all times during the bath, operating power cutting or other  tools, handling firearms, exposure to heights from which she might fall, exposure to vessels with hot cooking oil or water, and tub-bathing or swimming alone.      PLAN:    1.  Check carbamazepine/epoxide, lamotrigine and levetiracetam levels today.     2.  Continue levetiracetam 3000 mg b.i.d.  3.  Continue lamotrigine 100/200 mg daily.    4.  Continue carbamazepine extended release 200 mg b.i.d.   5.  Return to clinic in approximately 4 months.   I spent 40 minutes in this patient care, more than 50% of which consisted of counseling and coordinating care.     Yassine Eduardo M.D.   Professor of Neurology

## 2019-06-05 LAB
LAMOTRIGINE SERPL-MCNC: 3.8 UG/ML (ref 2.5–15)
LEVETIRACETAM SERPL-MCNC: 48 UG/ML (ref 12–46)

## 2019-06-06 NOTE — PROGRESS NOTES
[facility]:  RETURN VISIT          Service Date: 06/04/2019      HISTORY:  Ms. Magaly Renee is a 58-year-old, right-handed woman who returned for follow-up of idiopathic generalized epilepsy with absence seizures and generalized tonic-clonic seizures.  She came to the visit today with her .      The patient reported that she has not had any seizures following the most recent visit to this clinic on 12/11/2018.  The most recent grand mal seizure occurred in 2016.  The most recent absence seizure occurred approximately in 2013.      She has been having intermittent imbalance and diplopia occurring at about an hour after the morning AED doses for about an hour on several days each month.      Ictal semiology-history:   The patient reported that she only ever had 2 types of seizures in her lifetime, as grand mal seizures and absence seizures.      The grand mal seizures first occurred at 14 years of age.  She thinks that she probably had 8-12 total grand mal seizures widely spread out over the years, usually with 3-year intervals or longer between seizures.  The most recent of these have occurred in 2016.  These could arise from sleep or waking.  When awake, the patient never had any sort of aura or prodrome, but simply would find herself postictally down on the floor or ground with standing at onset with a severe headache, confusion and occasionally evidence of tongue biting or urinary incontinence.  Her  saw several of these events that arose from sleep in bed at night, described as rigid trunk and leg extension with arm flexion up to the chest and rhythmic generalized jerking with complete unresponsiveness, lasting about 1 minute.  He often noted confused postictal behaviors for 15 minutes or longer after a seizure.      The absence seizures probably began sometime in the early teens.  She often had clusters with multiple absences on the same day, but with many days  or even months between seizures, as recently as a decade ago.  The episodes since have declined in frequency and the last of these was witnessed, perhaps 5 years ago.  The patient herself would usually have no idea that one had occurred.  She never had any aura or postictal state with these.  Witnesses would simply note that she suddenly stopped talking and stopped moving, with no response to voice or touch, but with eyes open.  She would not fall down or change posture during these brief seizures.  Her  thought that these consistently lasted less than 20 seconds, and he did not witness any postictal state.      The patient denied that she has ever had a paroxysmal episode of sudden brief confusion, non-convulsive falling with unconsciousness, repetitive involuntary movements or posturing, or other paroxysmal phenomena.  She denied any history of convulsive status epilepticus, or complex partial status epilepticus.  She denied any history of sudden involuntary jerks while fully awake, but she has had hypnic jerks.      The patient does not recognize exacerbating or remitting factors with regard to seizure frequency.      Epilepsy-seizure predispositions:   The patient has no family history of epilepsy or seizures.  She has no history of gestational or  injury, febrile convulsions, developmental delay, stroke, meningitis, encephalitis, significant head injury, or other epileptic predispositions.  She denied a history of physical or sexual abuse by an adult during her childhood or adulthood.      Laboratory evaluations:   Scanned records from [facility] indicate that a normal brain MRI was obtained on 2001.    Multiple interictal EEG recordings were interpreted as showing generalized spike wave discharges, but sometimes also as showing left parietal or other left-sided focal spike-wave complexes, with the earliest EEG report dating to 2001 at [facility][1].      The patient had a  prolonged outpatient video EEG on 2018, at the [facility], which showed a burst of approximately 3-per-second spike-wave discharges which was atypical due to variable absence of spikes in the presence of high amplitude slow waves and which also showed a single focal left centroparietotemporal spike-wave complex.     A brain MRI detected no cerebral abnormalities, but showed focal enlargement of the left optic chiasm without associated enhancement, at the [facility] on 2018.    Epilepsy therapeutics:   The patient remembers that initially after her first grand mal seizure, she was started directly on phenytoin and phenobarbital, which she found highly sedating.  Over the years she was converted to various combinations of clonazepam, carbamazepine, lamotrigine and levetiracetam.  For the last approximately 10 years, she has been on a combination of levetiracetam, lamotrigine and carbamazepine with intermittent periods of blurry or double vision lasting several hours.  These improved when she began to take her medications with meals, but she still has a severe episode at least once per month.      Other history:   The patient began having migraine headaches in her 30S, which she usually were left retro-orbital or right retro-orbital in alternation.  These were not associated with nausea, vomiting, photophobia or phonophobia.  They might last many hours if untreated, but they are markedly aborted with the use of Fioricet.  They have improved over the years and currently are occurring about once per month or less.     PAST MEDICAL-SURGICAL HISTORY:    1.  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     2.  Chronic migraine headaches.   3.  Status post  section.     FAMILY HISTORY:  There is no family history of seizures or epilepsy.  Her mother had recurrent migraine headaches, but there is no other history of  neurologic disease in the family.        PERSONAL AND SOCIAL HISTORY:  The patient grew up in [place].  She completed her education with a bachelor's degree in mathematics at [employer].  She lives with her  and the younger 2 of their 3 sons.  She previously worked in business, primarily accounting.     She does not use alcohol, tobacco or illicit recreational drugs        REVIEW OF SYSTEMS:  The patient denied history of attention and memory disturbance, difficulties with comprehension and expression, difficulty in solving problems, weakness, tremors or other abnormal involuntary movements, numbness or tingling, incoordination, falling or difficulty in walking, urinary or fecal incontinence, headache and other pain, except as described above.  The patient denied any history of severe depression or suicidal ideation, severe anxiety, panic attacks, hallucinations, delusions, psychosis, substance abuse, or psychiatric hospitalization.  She denied rashes and easy bruising.  The remainder of a 14-system symptom review was negative except as noted above.       MEDICATIONS:  Levetiracetam 3000 mg b.i.d., lamotrigine 100/200 mg daily, carbamazepine extended release 200 mg b.i.d., and other medications as per the electronic medical record.     PHYSICAL EXAMINATION:    On examination, the patient was an alert woman in no apparent distress.  Pulse was 90 per minute.  Blood pressure was 117/58.  Respirations were 18 per minute.  Height was 66 inches, weight was 154 pounds.  Skull was normocephalic and atraumatic.  Neck was supple, without signs of meningeal irritation.      On neurological examination the patient appeared alert and was fully oriented to person, place, time, and reason for visit.  Speech showed normal articulation, fluency, repetitions, naming, syntax and comprehension.  She accurately repeated digits sequences, repeating 8 forward and 5 reversed.  At 10 minutes from presentation, 4 of 4 phrases  were recalled.  No apraxias or atavistic signs were elicited.  Fundoscopy was normal bilaterally.  Cranial nerves III through XII were normal.  Muscle masses, tones and strengths were normal throughout.  There was no pronator drift.  Sequential fine finger movements were performed normally with each hand.  No spontaneous tremors, myoclonus, or other abnormal movements were observed.  Sensations of light touch, pin prick, vibration and proprioception were reportedly normal throughout.  The rapid alternating movements, and finger-nose-finger and heel-shin maneuvers were performed normally bilaterally.  Romberg maneuver was negative.  Regular, heel, toe, tandem and reverse tandem walking were normal.  Deep tendon reflexes were normal and symmetric throughout.  Toes were downgoing bilaterally.      IMPRESSION:    The patient has had full seizure control for a number of years.  She is having intermittent adverse effects that are more characteristic of carbamazepine and lamotrigine AEs than of levetiracetam.  Her epilepsy syndrome generally is least responsive to carbamazepine, among the 3 agents.  I spent a considerable amount of time discussing with her reasons to consider tapering off carbamazepine, if levels of the other two AEDs are adequate.      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  She saw Dr. Luke Molina in Neurosurgery, who recommended close follow up with serial imaging.  The non-enhancing abnormality was reported to be unchanged on an MRI on 03/26/2019.      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  She strongly denied any history of myoclonus.  In addition to generalized spike-wave discharges, several electroencephalograms have shown left parietal or other  left hemispheric spike-wave complexes.  Possibly she has atypical interictal electroencephalographic abnormalities and idiopathic generalized epilepsy, although another possibility would be a partial epilepsy with secondary bilateral synchrony, causing complex partial and secondarily generalized tonic-clonic seizures.  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       The patient has good migraine control, which she will follow with Dr. Shayy Sands.        The patient herself noted that she would not drive for 3 months if seizures recurred.  I reviewed [place] regulations on seizures and driving with the patient.  She appeared to clearly understand that she is prohibited from operating a motor vehicle within 3 months following any seizure or other episode with sudden unconsciousness or inability to sit up, and that she is required to report any future such seizure to the [facility] within 30 days after the event.  I also recommended that she and her family review all of her other activities, and avoid any activities that might lead to self-injury or injury of others, within 3 months following any seizure with impaired awareness or impaired motor control.  Such activities include but are not limited to holding babies or young children at heights from which they might be injured if dropped, bathing infants or young children in situations in which they might drown without continuous interactive care by an adult who is fully capable at all times during the bath, operating power cutting or other tools, handling firearms, exposure to heights from which she might fall, exposure to vessels with hot cooking oil or water, and tub-bathing or swimming alone.      PLAN:    1.  Check carbamazepine/epoxide, lamotrigine and levetiracetam levels today.     2.  Continue levetiracetam 3000 mg b.i.d.  3.   Continue lamotrigine 100/200 mg daily.    4.  Continue carbamazepine extended release 200 mg b.i.d.   5.  Return to clinic in approximately 4 months.   I spent 40 minutes in this patient care, more than 50% of which consisted of counseling and coordinating care.       Yassine Eduardo M.D.   Professor of Neurology

## 2019-06-07 LAB
CARBAMAZEPINE EP SERPL-MCNC: 1.8 UG/ML (ref 0.4–4)
CARBAMAZEPINE SERPL-MCNC: 5.9 UG/ML (ref 4–12)

## 2019-07-12 DIAGNOSIS — G43.009 MIGRAINE WITHOUT AURA AND WITHOUT STATUS MIGRAINOSUS, NOT INTRACTABLE: ICD-10-CM

## 2019-07-12 NOTE — TELEPHONE ENCOUNTER
Reason for Call:  Medication or medication refill:    Do you use a [facility]?  Name of the pharmacy and phone number for the current request:  [facility] - [phone]    Name of the medication requested: Butalbital-APAP-Caff    Other request:   LAST REFILL: 06/24/2019  LOV: 10/25/2018    Can we leave a detailed message on this number? Not Applicable    Phone number patient can be reached at: Home number on file [phone] (home)    Best Time: NA    Call taken on 7/12/2019 at 10:23 AM by Denise Behrendt

## 2019-07-15 RX ORDER — BUTALBITAL, ACETAMINOPHEN AND CAFFEINE 50; 325; 40 MG/1; MG/1; MG/1
1 TABLET ORAL EVERY 4 HOURS PRN
Qty: 12 TABLET | Refills: 1 | Status: SHIPPED | OUTPATIENT
Start: 2019-07-15 | End: 2019-09-12

## 2019-07-23 ENCOUNTER — TELEPHONE (OUTPATIENT)
Dept: FAMILY MEDICINE | Facility: CLINIC | Age: 59
End: 2019-07-23

## 2019-07-23 NOTE — TELEPHONE ENCOUNTER
Panel Management Review      Patient has the following on her problem list: None      Composite cancer screening  Chart review shows that this patient is due/due soon for the following Pap Smear, Mammogram and Colonoscopy  Summary:    Patient is due/failing the following:   COLONOSCOPY, LDL, MAMMOGRAM, PAP and PHYSICAL    Action needed:   Patient needs office visit for PEPAP, Lipid. and Patient needs referral/order: Colon and mammo.     Type of outreach:    Sent [employer] message. and Sent letter.    Questions for provider review:    None                                                                                                                                    Mone Delgado CMA       Chart routed to self .

## 2019-07-23 NOTE — LETTER
July 24, 2019      Magaly Renee  [address]  [place] [address]        Dear Karen,    In order to ensure we are providing the best quality care, Madelyn and PATTI have reviewed your chart and see that you are due for a yearly Physical including a Pap and a fasting cholesterol lab test, Colonoscopy and Mammogram.     Please call the clinic to set up an office visit at [phone] or [phone]. Please come fasting to the appointment, usually early morning appointment make this easier.  Fasting = Nothing to eat or drink for 10-12 hours prior to test, but can have plenty of water.    Early detection of Breast Cancer is very important.  It is the key to successful treatment. Although mammography is the most accurate method for early detection, not all cancers are found through mammography.  A thorough examination includes a combination of mammography, physical examination and monthly breast self-examination. Women should begin having mammograms yearly at age 40, or earlier if they're at high risk.  1 in 8 women will develop invasive breast cancer during her lifetime and it is the most common non-skin cancer in American women. EARLY detection, new treatments, and a better understanding of the disease have increased survival rates - the 5 year survival rate in the 1960s was 63% and today it is close to 90%.     Please call [phone] to schedule a Mammogram at one of the following [facility] Facilities: [facility], [facility], [facility].    Colon cancer screening is recommended starting at the age of 50. We have noticed that you have not yet had your colonoscopy. We recognize that this procedure can be time consuming and sometimes uncomfortable. However, colonoscopy is the gold standard for colon cancer screening and may be performed as infrequently as every 10 years as long as the colon appears healthy.  Colonoscopy enables the physician to detect and remove  precancerous polyps and identify early cancers during a single examination. Since colorectal cancer is the second leading cause of cancer related deaths in the [place], we strongly recommend screening for this disease.    Please call and set up an appointment at one of the [place] [facility] facilities:  [facility] [phone]  [facility] [phone]  [facility] [phone]    We greatly appreciate the opportunity to serve you. Thank you for trusting us with your health care.    Sincerely,    JORGE Rabago, CMA

## 2019-08-07 ENCOUNTER — TELEPHONE (OUTPATIENT)
Dept: OPHTHALMOLOGY | Facility: CLINIC | Age: 59
End: 2019-08-07

## 2019-08-07 NOTE — TELEPHONE ENCOUNTER
Note to facilitator for assistance.  Leonid Shah RN 9:33 AM 08/07/19        [facility] Call Center    Phone Message    May a detailed message be left on voicemail: yes    Reason for Call: Other: .  per pt request - please call ludmila metcalf to request an old daxa MRI from 5/25/2001. Please call [phone]    Please call pt back and leave a message once this is completed.    Action Taken: Message routed to:  [facility] clinic

## 2019-08-15 ENCOUNTER — TELEPHONE (OUTPATIENT)
Dept: FAMILY MEDICINE | Facility: CLINIC | Age: 59
End: 2019-08-15

## 2019-09-11 DIAGNOSIS — G43.009 MIGRAINE WITHOUT AURA AND WITHOUT STATUS MIGRAINOSUS, NOT INTRACTABLE: ICD-10-CM

## 2019-09-11 NOTE — TELEPHONE ENCOUNTER
Reason for Call:  Medication or medication refill:    Do you use a [facility]?  Name of the pharmacy and phone number for the current request:  [facility] - [phone]    Name of the medication requested: butalbital-acetaminophen-caffeine    Other request: LOV:  10/25/18  LAST REFILL:  8/13/19    Can we leave a detailed message on this number? YES    Phone number patient can be reached at: Home number on file [phone] (home)    Best Time: any     Call taken on 9/11/2019 at 1:40 PM by Veronica Daniel

## 2019-09-12 RX ORDER — BUTALBITAL, ACETAMINOPHEN AND CAFFEINE 50; 325; 40 MG/1; MG/1; MG/1
1 TABLET ORAL EVERY 4 HOURS PRN
Qty: 12 TABLET | Refills: 1 | Status: SHIPPED | OUTPATIENT
Start: 2019-09-12 | End: 2019-11-04

## 2019-09-30 ENCOUNTER — OFFICE VISIT (OUTPATIENT)
Dept: FAMILY MEDICINE | Facility: CLINIC | Age: 59
End: 2019-09-30
Payer: COMMERCIAL

## 2019-09-30 VITALS — HEIGHT: 66 IN | BODY MASS INDEX: 24.59 KG/M2 | WEIGHT: 153 LBS

## 2019-09-30 DIAGNOSIS — N30.01 ACUTE CYSTITIS WITH HEMATURIA: Primary | ICD-10-CM

## 2019-09-30 DIAGNOSIS — R39.89 URINARY PROBLEM: ICD-10-CM

## 2019-09-30 DIAGNOSIS — Z28.21 INFLUENZA VACCINATION DECLINED BY PATIENT: ICD-10-CM

## 2019-09-30 LAB
ALBUMIN UR-MCNC: NEGATIVE MG/DL
APPEARANCE UR: CLEAR
BACTERIA #/AREA URNS HPF: ABNORMAL /HPF
BILIRUB UR QL STRIP: NEGATIVE
COLOR UR AUTO: YELLOW
GLUCOSE UR STRIP-MCNC: NEGATIVE MG/DL
HGB UR QL STRIP: ABNORMAL
KETONES UR STRIP-MCNC: NEGATIVE MG/DL
LEUKOCYTE ESTERASE UR QL STRIP: ABNORMAL
NITRATE UR QL: NEGATIVE
NON-SQ EPI CELLS #/AREA URNS LPF: ABNORMAL /LPF
PH UR STRIP: 6 PH (ref 5–7)
RBC #/AREA URNS AUTO: ABNORMAL /HPF
SOURCE: ABNORMAL
SP GR UR STRIP: 1.01 (ref 1–1.03)
UROBILINOGEN UR STRIP-ACNC: 0.2 EU/DL (ref 0.2–1)
WBC #/AREA URNS AUTO: ABNORMAL /HPF

## 2019-09-30 PROCEDURE — 99213 OFFICE O/P EST LOW 20 MIN: CPT | Performed by: PHYSICIAN ASSISTANT

## 2019-09-30 PROCEDURE — 81001 URINALYSIS AUTO W/SCOPE: CPT | Performed by: PHYSICIAN ASSISTANT

## 2019-09-30 RX ORDER — SULFAMETHOXAZOLE/TRIMETHOPRIM 800-160 MG
1 TABLET ORAL 2 TIMES DAILY
Qty: 6 TABLET | Refills: 0 | Status: SHIPPED | OUTPATIENT
Start: 2019-09-30 | End: 2020-01-15

## 2019-09-30 ASSESSMENT — MIFFLIN-ST. JEOR: SCORE: 1287.42

## 2019-09-30 NOTE — PROGRESS NOTES
Subjective     Magaly Renee is a 59 year old female who presents to clinic today for the following health issues:    HPI   URINARY TRACT SYMPTOMS  Onset: 2 days    Description:   Painful urination (Dysuria): YES           Frequency: YES  Blood in urine (Hematuria): no   Delay in urine (Hesitency): no     Intensity: mild, moderate    Progression of Symptoms:  same    Accompanying Signs & Symptoms:  Fever/chills: no   Flank pain no   Nausea and vomiting: no   Any vaginal symptoms: none  Abdominal/Pelvic Pain: no     History:   History of frequent UTI's: no   History of kidney stones: no   Sexually Active: no   Possibility of pregnancy: No    Precipitating factors:   none  Therapies Tried and outcome: OTC advil, azo    Ally DeShong CMA    Did a test over the weekend which showed a positive UTI.  She used azo. She does feel her symptoms have improved.    Still with urinary frequency, pain has resolved (Saturday was really painful and uncomfortable)  No pain in back.         Patient Active Problem List   Diagnosis     Intractable generalized epilepsy (H)     Persistent Leukopenia     CARDIOVASCULAR SCREENING; LDL GOAL LESS THAN 160     Migraine headache     24 hr info given     Disorder of optic chiasm     Past Surgical History:   Procedure Laterality Date     C/SECTION, LOW TRANSVERSE      , Low Transverse       Social History     Tobacco Use     Smoking status: Never Smoker     Smokeless tobacco: Never Used   Substance Use Topics     Alcohol use: Yes     Comment: occassion     Family History   Problem Relation Age of Onset     Neurologic Disorder Mother         migraine     Diabetes No family hx of      C.A.D. No family hx of      Hypertension No family hx of      Cancer - colorectal No family hx of      Prostate Cancer No family hx of      Breast Cancer No family hx of      Cerebrovascular Disease No family hx of          Current Outpatient Medications   Medication Sig Dispense Refill     aspirin 325  MG tablet Take by mouth daily as needed for moderate pain       butalbital-acetaminophen-caffeine (FIORICET/ESGIC) -40 MG tablet Take 1 tablet by mouth every 4 hours as needed for headaches or moderate to severe pain 12 tablet 1     carBAMazepine (CARBATROL) 200 MG 12 hr capsule TAKE 1 CAPS IN AM AND 1 CAPS IN PM. 60 capsule 11     lamoTRIgine (LAMICTAL) 100 MG tablet Take 1 tablet (100 mg) in the AM and 2 tablets (200 mg) in the PM. 90 tablet 11     levETIRAcetam (KEPPRA) 1000 MG tablet Take 3 tablets (3000mg) two times daily. 180 tablet 11     sulfamethoxazole-trimethoprim (BACTRIM DS) 800-160 MG tablet Take 1 tablet by mouth 2 times daily for 3 days 6 tablet 0     cetirizine (ZYRTEC) 10 MG tablet Take 1 tablet (10 mg) by mouth every morning (Needs follow-up appointment for this medication) (Patient not taking: Reported on 3/27/2019) 30 tablet 0     fluticasone (FLONASE) 50 MCG/ACT nasal spray Spray 2 sprays into both nostrils daily (Patient not taking: Reported on 3/27/2019) 1 Package 1     lifitegrast (XIIDRA) 5 % opthalmic solution Place 1 drop into both eyes 2 times daily (Patient not taking: Reported on 9/30/2019) 1 each 11     olopatadine (PATANOL) 0.1 % ophthalmic solution INSTILL 1 DROP INTO BOTH EYES TWO TIMES A DAY AS NEEDED (Patient not taking: Reported on 3/12/2019) 5 mL 0     BP Readings from Last 3 Encounters:   09/30/19 (P) 98/62   06/04/19 98/61   03/27/19 96/68    Wt Readings from Last 3 Encounters:   09/30/19 69.4 kg (153 lb)   06/04/19 68.4 kg (150 lb 12.8 oz)   03/27/19 75 kg (165 lb 4.8 oz)                      Reviewed and updated as needed this visit by Provider         Review of Systems   ROS COMP: Constitutional, HEENT, cardiovascular, pulmonary, gi and gu systems are negative, except as otherwise noted.      Objective    LMP 07/06/2016 (Approximate)   There is no height or weight on file to calculate BMI.  Physical Exam   GENERAL: healthy, alert and no distress    Diagnostic Test  Results:  Labs reviewed in Epic  Results for orders placed or performed in visit on 09/30/19 (from the past 24 hour(s))   *UA reflex to Microscopic and Culture ([facility] (except [place] and [place])   Result Value Ref Range    Color Urine Yellow     Appearance Urine Clear     Glucose Urine Negative NEG^Negative mg/dL    Bilirubin Urine Negative NEG^Negative    Ketones Urine Negative NEG^Negative mg/dL    Specific Gravity Urine 1.015 1.003 - 1.035    Blood Urine Trace (A) NEG^Negative    pH Urine 6.0 5.0 - 7.0 pH    Protein Albumin Urine Negative NEG^Negative mg/dL    Urobilinogen Urine 0.2 0.2 - 1.0 EU/dL    Nitrite Urine Negative NEG^Negative    Leukocyte Esterase Urine Small (A) NEG^Negative    Source Midstream Urine    Urine Microscopic   Result Value Ref Range    WBC Urine 0 - 5 OTO5^0 - 5 /HPF    RBC Urine O - 2 OTO2^O - 2 /HPF    Squamous Epithelial /LPF Urine Moderate (A) FEW^Few /LPF    Bacteria Urine Few (A) NEG^Negative /HPF           Assessment & Plan     1. Acute cystitis with hematuria  UTI uncomplicated without evidence of pyelonephritis    Urine analysis noted and in chart.   Treatment per orders -  patient to increase fluids/cranberry juice. Call or return to clinic prn if these symptoms worsen or fail to improve as anticipated.     - sulfamethoxazole-trimethoprim (BACTRIM DS) 800-160 MG tablet; Take 1 tablet by mouth 2 times daily for 3 days  Dispense: 6 tablet; Refill: 0    2. Urinary problem    - *UA reflex to Microscopic and Culture ([facility] (except [place] and [place])  - Urine Microscopic    3. Influenza vaccination declined by patient  She declines today, but plans to have this done at pharmacy.   Discussed the importance of flu shot.          Due for preventative screenings.  Health care directive given and discussed importance of filling this out.     Return in about 4 weeks (around 10/28/2019) for Physical Exam.    Suni Mason,  JORGE  [facility]

## 2019-11-04 DIAGNOSIS — G43.009 MIGRAINE WITHOUT AURA AND WITHOUT STATUS MIGRAINOSUS, NOT INTRACTABLE: ICD-10-CM

## 2019-11-04 RX ORDER — BUTALBITAL, ACETAMINOPHEN AND CAFFEINE 50; 325; 40 MG/1; MG/1; MG/1
1 TABLET ORAL EVERY 4 HOURS PRN
Qty: 12 TABLET | Refills: 1 | Status: SHIPPED | OUTPATIENT
Start: 2019-11-04 | End: 2020-01-06

## 2019-11-04 NOTE — TELEPHONE ENCOUNTER
Reason for Call:  Medication or medication refill:    Do you use a [facility]?  Name of the pharmacy and phone number for the current request:  [facility] - [phone]    Name of the medication requested: Butalbital    Other request:   LAST REFILL: 10/03/2019  LOV: 10/25/2018    Can we leave a detailed message on this number? Not Applicable    Phone number patient can be reached at: Home number on file [phone] (home)    Best Time: NA    Call taken on 11/4/2019 at 11:12 AM by Denise Behrendt

## 2019-11-05 ENCOUNTER — HEALTH MAINTENANCE LETTER (OUTPATIENT)
Age: 59
End: 2019-11-05

## 2020-01-03 DIAGNOSIS — G43.009 MIGRAINE WITHOUT AURA AND WITHOUT STATUS MIGRAINOSUS, NOT INTRACTABLE: ICD-10-CM

## 2020-01-03 NOTE — TELEPHONE ENCOUNTER
"Last office visit was 6/4/19 with \"Jayce>    Future Office visit:   Has future Neuro appt with \"Jayce\" in March.    Routing refill request to provider for review/approval because:  No longer current office visits with Noe Holm.    Tanya STOVALL   Specialty Clinic RN        "

## 2020-01-03 NOTE — TELEPHONE ENCOUNTER
Reason for Call:  Medication or medication refill:    Do you use a [facility]?  Name of the pharmacy and phone number for the current request:  [facility] - [phone]    Name of the medication requested: Butalbital-APAP-Caff     Other request:   LAST REFILL: 12/05/2019  LOV: 10/25/2018    Can we leave a detailed message on this number? Not Applicable    Phone number patient can be reached at: Home number on file [phone] (home)    Best Time: NA    Call taken on 1/3/2020 at 9:03 AM by Denise Behrendt

## 2020-01-06 RX ORDER — BUTALBITAL, ACETAMINOPHEN AND CAFFEINE 50; 325; 40 MG/1; MG/1; MG/1
1 TABLET ORAL EVERY 4 HOURS PRN
Qty: 12 TABLET | Refills: 1 | Status: SHIPPED | OUTPATIENT
Start: 2020-01-06 | End: 2020-02-19

## 2020-01-15 ENCOUNTER — OFFICE VISIT (OUTPATIENT)
Dept: FAMILY MEDICINE | Facility: CLINIC | Age: 60
End: 2020-01-15
Payer: COMMERCIAL

## 2020-01-15 VITALS
WEIGHT: 169 LBS | TEMPERATURE: 96.6 F | DIASTOLIC BLOOD PRESSURE: 50 MMHG | SYSTOLIC BLOOD PRESSURE: 90 MMHG | HEIGHT: 67 IN | HEART RATE: 64 BPM | BODY MASS INDEX: 26.53 KG/M2 | RESPIRATION RATE: 20 BRPM

## 2020-01-15 DIAGNOSIS — Z23 NEED FOR PROPHYLACTIC VACCINATION AND INOCULATION AGAINST INFLUENZA: ICD-10-CM

## 2020-01-15 DIAGNOSIS — H10.33 ACUTE BACTERIAL CONJUNCTIVITIS OF BOTH EYES: Primary | ICD-10-CM

## 2020-01-15 PROCEDURE — 99213 OFFICE O/P EST LOW 20 MIN: CPT | Mod: 25 | Performed by: NURSE PRACTITIONER

## 2020-01-15 PROCEDURE — 90682 RIV4 VACC RECOMBINANT DNA IM: CPT | Performed by: NURSE PRACTITIONER

## 2020-01-15 PROCEDURE — 90471 IMMUNIZATION ADMIN: CPT | Performed by: NURSE PRACTITIONER

## 2020-01-15 RX ORDER — POLYMYXIN B SULFATE AND TRIMETHOPRIM 1; 10000 MG/ML; [USP'U]/ML
SOLUTION OPHTHALMIC
Qty: 1 BOTTLE | Refills: 1 | Status: SHIPPED | OUTPATIENT
Start: 2020-01-15 | End: 2021-02-02

## 2020-01-15 ASSESSMENT — ENCOUNTER SYMPTOMS
DIZZINESS: 0
NAUSEA: 0
SINUS PRESSURE: 0
HEADACHES: 0
CONSTIPATION: 0
SORE THROAT: 0
DIAPHORESIS: 0
FEVER: 0
DIARRHEA: 0
EYE PAIN: 0
WHEEZING: 0
LIGHT-HEADEDNESS: 0
EYE REDNESS: 1
SHORTNESS OF BREATH: 0
PHOTOPHOBIA: 0
CHILLS: 0
EYE DISCHARGE: 0
COUGH: 0
RHINORRHEA: 0
EYE ITCHING: 1
FATIGUE: 0

## 2020-01-15 ASSESSMENT — MIFFLIN-ST. JEOR: SCORE: 1379.21

## 2020-01-15 ASSESSMENT — PAIN SCALES - GENERAL: PAINLEVEL: NO PAIN (0)

## 2020-01-15 NOTE — PROGRESS NOTES
Subjective     Magaly Renee is a 59 year old female who presents to clinic today for the following health issues:    HPI     Eye(s) Problem      Duration: 5 days    Description:  Location: bilateral  Pain: no   Redness: YES  Discharge: no     Accompanying signs and symptoms: swelling, itching    History (Trauma, foreign body exposure,): None    Precipitating or alleviating factors (contact use): None    Therapies tried and outcome: Benadryl, Dayquil, OTC dry eye drops    Current Outpatient Medications   Medication Sig Dispense Refill     aspirin 325 MG tablet Take by mouth daily as needed for moderate pain       butalbital-acetaminophen-caffeine (FIORICET/ESGIC) -40 MG tablet Take 1 tablet by mouth every 4 hours as needed for headaches or moderate to severe pain 12 tablet 1     carBAMazepine (CARBATROL) 200 MG 12 hr capsule TAKE 1 CAPS IN AM AND 1 CAPS IN PM. 60 capsule 11     lamoTRIgine (LAMICTAL) 100 MG tablet Take 1 tablet (100 mg) in the AM and 2 tablets (200 mg) in the PM. 90 tablet 11     levETIRAcetam (KEPPRA) 1000 MG tablet Take 3 tablets (3000mg) two times daily. 180 tablet 11     trimethoprim-polymyxin b (POLYTRIM) 44693-8.1 UNIT/ML-% ophthalmic solution Apply 1 drop in both eye three times per day. Use for 2 days after the redness is gone. Typically 5-7 days total. 1 Bottle 1     cetirizine (ZYRTEC) 10 MG tablet Take 1 tablet (10 mg) by mouth every morning (Needs follow-up appointment for this medication) (Patient not taking: Reported on 3/27/2019) 30 tablet 0     fluticasone (FLONASE) 50 MCG/ACT nasal spray Spray 2 sprays into both nostrils daily (Patient not taking: Reported on 3/27/2019) 1 Package 1     lifitegrast (XIIDRA) 5 % opthalmic solution Place 1 drop into both eyes 2 times daily (Patient not taking: Reported on 9/30/2019) 1 each 11     olopatadine (PATANOL) 0.1 % ophthalmic solution INSTILL 1 DROP INTO BOTH EYES TWO TIMES A DAY AS NEEDED (Patient not taking: Reported on 3/12/2019)  "5 mL 0     Allergies   Allergen Reactions     Bee Venom Anaphylaxis       Reviewed and updated as needed this visit by Provider  Problems       Has been having itching for the last 4 days. No pain behind eye. No vision changes. Eyes are not sensitive to light. Has been doing over the counter and that really is not helping. Does not wear contacts. Does volunteer at the elementary school.    Has not had influenza vaccine.  Is agreeable to getting here today.    Declines wanting to set up appointment for physical with Pap smear and recommended screenings.    Objective    BP 90/50 (BP Location: Right arm, Patient Position: Sitting, Cuff Size: Adult Regular)   Pulse 64   Temp 96.6  F (35.9  C) (Tympanic)   Resp 20   Ht 1.702 m (5' 7\")   Wt 76.7 kg (169 lb)   LMP 07/06/2016 (LMP Unknown)   BMI 26.47 kg/m    Body mass index is 26.47 kg/m .          Assessment & Plan     Review of Systems   Constitutional: Negative for chills, diaphoresis, fatigue and fever.   HENT: Negative for ear discharge, ear pain, hearing loss, rhinorrhea, sinus pressure and sore throat.    Eyes: Positive for redness and itching. Negative for photophobia, pain, discharge and visual disturbance.   Respiratory: Negative for cough, shortness of breath and wheezing.    Gastrointestinal: Negative for constipation, diarrhea and nausea.   Skin: Negative for rash.   Neurological: Negative for dizziness, light-headedness and headaches.       Physical Exam  Constitutional:       Appearance: She is well-developed.   HENT:      Right Ear: Tympanic membrane and external ear normal. No middle ear effusion. Tympanic membrane is not erythematous.      Left Ear: Tympanic membrane and external ear normal.  No middle ear effusion. Tympanic membrane is not erythematous.      Nose: No mucosal edema or rhinorrhea.   Eyes:      Conjunctiva/sclera:      Right eye: Right conjunctiva is injected. No exudate.     Left eye: Left conjunctiva is injected. No " exudate.  Cardiovascular:      Rate and Rhythm: Normal rate and regular rhythm.      Heart sounds: Normal heart sounds.   Pulmonary:      Effort: Pulmonary effort is normal.      Breath sounds: Normal breath sounds.   Abdominal:      General: Bowel sounds are normal.      Palpations: Abdomen is soft.   Skin:     General: Skin is warm and dry.   Neurological:      Mental Status: She is alert.         1. Acute bacterial conjunctivitis of both eyes  Use warm washcloth to clean discharge from eyes. Apply ointment or drops as prescribed until clear of matter for 48 hours.   Discussed contagiousness  Enc good handwashing  - trimethoprim-polymyxin b (POLYTRIM) 56102-5.1 UNIT/ML-% ophthalmic solution; Apply 1 drop in both eye three times per day. Use for 2 days after the redness is gone. Typically 5-7 days total.  Dispense: 1 Bottle; Refill: 1       Return in about 1 week (around 1/22/2020), or if symptoms worsen or fail to improve.    FREYA Diaz [facility]

## 2020-01-15 NOTE — PATIENT INSTRUCTIONS
Patient Education     Bacterial Conjunctivitis    You have an infection in the membranes covering the white part of the eye. This part of the eye is called the conjunctiva. The infection is called conjunctivitis. The most common symptoms of conjunctivitis include a thick, pus-like discharge from the eye, swollen eyelids, redness, eyelids sticking together upon awakening, and a gritty or scratchy feeling in the eye. Your infection was caused by bacteria. It may be treated with medicine. With treatment, the infection takes about 7 to 10 days to resolve.  Home care    Use prescribed antibiotic eye drops or ointment as directed to treat the infection.    Apply a warm compress (towel soaked in warm water) to the affected eye 3 to 4 times a day. Do this just before applying medicine to the eye.    Use a warm, wet cloth to wipe away crusting of the eyelids in the morning. This is caused by mucus drainage during the night. You may also use saline irrigating solution or artificial tears to rinse away mucus in the eye. Do not put a patch over the eye.    Wash your hands before and after touching the infected eye. This is to prevent spreading the infection to the other eye, and to other people. Don't share your towels or washcloths with others.    You may use acetaminophen or ibuprofen to control pain, unless another medicine was prescribed. (Note: If you have chronic liver or kidney disease or have ever had a stomach ulcer or gastrointestinal bleeding, talk with your doctor before using these medicines.)    Don't wear contact lenses until your eyes have healed and all symptoms are gone.  Follow-up care  Follow up with your healthcare provider, or as advised.  When to seek medical advice  Call your healthcare provider right away if any of these occur:    Worsening vision    Increasing pain in the eye    Increasing swelling or redness of the eyelid    Redness spreading around the eye  Date Last Reviewed: 7/1/2017 2000-2019  [employer]. [address] [place] [address], [place] [address]. All rights reserved. This information is not intended as a substitute for professional medical care. Always follow your healthcare professional's instructions.

## 2020-01-21 ENCOUNTER — OFFICE VISIT (OUTPATIENT)
Dept: FAMILY MEDICINE | Facility: CLINIC | Age: 60
End: 2020-01-21
Payer: COMMERCIAL

## 2020-01-21 VITALS
DIASTOLIC BLOOD PRESSURE: 68 MMHG | HEIGHT: 67 IN | WEIGHT: 167 LBS | OXYGEN SATURATION: 97 % | SYSTOLIC BLOOD PRESSURE: 92 MMHG | BODY MASS INDEX: 26.21 KG/M2 | HEART RATE: 72 BPM | TEMPERATURE: 96.3 F

## 2020-01-21 DIAGNOSIS — H57.9 ALLERGIC EYE REACTION: Primary | ICD-10-CM

## 2020-01-21 PROCEDURE — 99213 OFFICE O/P EST LOW 20 MIN: CPT | Performed by: FAMILY MEDICINE

## 2020-01-21 ASSESSMENT — MIFFLIN-ST. JEOR: SCORE: 1370.14

## 2020-01-21 NOTE — PROGRESS NOTES
"Subjective     Magaly Renee is a 59 year old female who presents to clinic today for the following health issues:    Chief Complaint   Patient presents with     RECHECK     recheck eye, seen 1/15/20 still red, watery, very itchy. The drops given are no helping.        HPI     She has been using the eye drops since last wed the eyes are getting more irritated no change in     Patient Active Problem List   Diagnosis     Intractable generalized epilepsy (H)     Persistent Leukopenia     Migraine headache     Disorder of optic chiasm     Past Surgical History:   Procedure Laterality Date     C/SECTION, LOW TRANSVERSE      , Low Transverse       Social History     Tobacco Use     Smoking status: Never Smoker     Smokeless tobacco: Never Used   Substance Use Topics     Alcohol use: Yes     Comment: occassion     Family History   Problem Relation Age of Onset     Neurologic Disorder Mother         migraine     Diabetes No family hx of      C.A.D. No family hx of      Hypertension No family hx of      Cancer - colorectal No family hx of      Prostate Cancer No family hx of      Breast Cancer No family hx of      Cerebrovascular Disease No family hx of            No change in vision     Reviewed and updated as needed this visit by Provider         Review of Systems   ROS COMP: Constitutional, HEENT, cardiovascular, pulmonary, gi and gu systems are negative, except as otherwise noted.      Objective    BP 92/68   Pulse 72   Temp 96.3  F (35.7  C) (Tympanic)   Ht 1.702 m (5' 7\")   Wt 75.8 kg (167 lb)   LMP 2016 (LMP Unknown)   SpO2 97%   BMI 26.16 kg/m    Body mass index is 26.16 kg/m .  Physical Exam   GENERAL: healthy, alert and no distress  EYES: conjunctiva/corneas- conjunctival injection each eye cler discharge EEOMI perrla   NECK: no adenopathy, no asymmetry, masses, or scars and thyroid normal to palpation  No preauricular lymphadenopathy   Diagnostic Test Results:  Labs reviewed in " "Epic  none         Assessment & Plan     1. Allergic eye reaction  With it getting better then just the redness and irritation likely that the eye drops are now causing the problem. Will have her stop use arttificial tears   Patient Instructions   Natural tears few times a few times per day   If not improving in the next 48 hours call and I would like you to see the eye doctor            BMI:   Estimated body mass index is 26.16 kg/m  as calculated from the following:    Height as of this encounter: 1.702 m (5' 7\").    Weight as of this encounter: 75.8 kg (167 lb).           See Patient Instructions    No follow-ups on file.    Kaya Scott MD  [facility]      "

## 2020-01-21 NOTE — PATIENT INSTRUCTIONS
Natural tears few times a few times per day   If not improving in the next 48 hours call and I would like you to see the eye doctor

## 2020-02-18 DIAGNOSIS — G43.009 MIGRAINE WITHOUT AURA AND WITHOUT STATUS MIGRAINOSUS, NOT INTRACTABLE: ICD-10-CM

## 2020-02-18 NOTE — TELEPHONE ENCOUNTER
Requested Prescriptions   Pending Prescriptions Disp Refills     butalbital-acetaminophen-caffeine (ESGIC) -40 MG tablet 12 tablet 1     Sig: Take 1 tablet by mouth every 4 hours as needed for headaches or moderate to severe pain       There is no refill protocol information for this order        Last Written Prescription Date:    Last Fill Quantity: ,  # refills:    Last office visit: No previous visit found with prescribing provider:  Wicho   Future Office Visit:

## 2020-02-19 RX ORDER — BUTALBITAL, ACETAMINOPHEN AND CAFFEINE 50; 325; 40 MG/1; MG/1; MG/1
1 TABLET ORAL EVERY 4 HOURS PRN
Qty: 12 TABLET | Refills: 1 | Status: SHIPPED | OUTPATIENT
Start: 2020-02-19 | End: 2020-03-27

## 2020-02-26 ENCOUNTER — TELEPHONE (OUTPATIENT)
Dept: NEUROLOGY | Facility: CLINIC | Age: 60
End: 2020-02-26

## 2020-02-26 DIAGNOSIS — G40.319 INTRACTABLE GENERALIZED EPILEPSY (H): Primary | ICD-10-CM

## 2020-03-03 ENCOUNTER — OFFICE VISIT (OUTPATIENT)
Dept: NEUROLOGY | Facility: CLINIC | Age: 60
End: 2020-03-03
Payer: COMMERCIAL

## 2020-03-03 ENCOUNTER — HOSPITAL ENCOUNTER (OUTPATIENT)
Facility: CLINIC | Age: 60
Setting detail: SPECIMEN
Discharge: HOME | End: 2020-03-03
Admitting: PSYCHIATRY & NEUROLOGY
Payer: COMMERCIAL

## 2020-03-03 VITALS
BODY MASS INDEX: 24.86 KG/M2 | WEIGHT: 158.7 LBS | SYSTOLIC BLOOD PRESSURE: 116 MMHG | OXYGEN SATURATION: 98 % | HEART RATE: 68 BPM | DIASTOLIC BLOOD PRESSURE: 66 MMHG

## 2020-03-03 DIAGNOSIS — G40.319 INTRACTABLE GENERALIZED EPILEPSY (H): ICD-10-CM

## 2020-03-03 DIAGNOSIS — G40.409 OTHER GENERALIZED EPILEPSY, NOT INTRACTABLE, WITHOUT STATUS EPILEPTICUS (H): ICD-10-CM

## 2020-03-03 DIAGNOSIS — G40.319 INTRACTABLE GENERALIZED EPILEPSY (H): Primary | ICD-10-CM

## 2020-03-03 PROCEDURE — 80156 ASSAY CARBAMAZEPINE TOTAL: CPT | Performed by: PSYCHIATRY & NEUROLOGY

## 2020-03-03 PROCEDURE — 80177 DRUG SCRN QUAN LEVETIRACETAM: CPT | Performed by: PSYCHIATRY & NEUROLOGY

## 2020-03-03 PROCEDURE — 80175 DRUG SCREEN QUAN LAMOTRIGINE: CPT | Performed by: PSYCHIATRY & NEUROLOGY

## 2020-03-03 PROCEDURE — 36415 COLL VENOUS BLD VENIPUNCTURE: CPT | Performed by: PSYCHIATRY & NEUROLOGY

## 2020-03-03 RX ORDER — LAMOTRIGINE 100 MG/1
TABLET ORAL
Qty: 90 TABLET | Refills: 11 | Status: SHIPPED | OUTPATIENT
Start: 2020-03-03 | End: 2021-02-02

## 2020-03-03 RX ORDER — LEVETIRACETAM 1000 MG/1
TABLET ORAL
Qty: 180 TABLET | Refills: 11 | Status: SHIPPED | OUTPATIENT
Start: 2020-03-03 | End: 2020-12-09

## 2020-03-03 ASSESSMENT — PAIN SCALES - GENERAL: PAINLEVEL: NO PAIN (0)

## 2020-03-03 NOTE — LETTER
RE: Magaly Renee  [address] [place] [address]     Dear Colleague,    Thank you for referring your patient, Magaly Renee, to the [facility]UR[facility] at [facility]. Please see a copy of my visit note below.    [facility]:  RETURN VISIT          Service Date: 03/03/2020    HISTORY:  Ms. Magaly Renee is a 59-year-old, right-handed woman who returned for follow-up of idiopathic generalized epilepsy with absence seizures and generalized tonic-clonic seizures.  She came to the visit today with her .      The patient reported that she has not had any seizures following the most recent visit to this clinic on 06/04/2019.  The most recent grand mal seizure occurred in 2016.  The most recent absence seizure occurred approximately in 2013.      She denied adverse effects of the AEDs.      Ictal semiology-history:   The patient reported that she only ever had 2 types of seizures in her lifetime, as grand mal seizures and absence seizures.      The grand mal seizures first occurred at 14 years of age.  She thinks that she probably had 8-12 total grand mal seizures widely spread out over the years, usually with 3-year intervals or longer between seizures.  The most recent of these have occurred in 2016.  These could arise from sleep or waking.  When awake, the patient never had any sort of aura or prodrome, but simply would find herself postictally down on the floor or ground with standing at onset with a severe headache, confusion and occasionally evidence of tongue biting or urinary incontinence.  Her  saw several of these events that arose from sleep in bed at night, described as rigid trunk and leg extension with arm flexion up to the chest and rhythmic generalized jerking with complete unresponsiveness, lasting about 1 minute.  He often noted confused postictal behaviors for 15 minutes or longer after a seizure.       The absence seizures probably began sometime in the early teens.  She often had clusters with multiple absences on the same day, but with many days or even months between seizures, as recently as a decade ago.  The episodes since have declined in frequency and the last of these was witnessed, perhaps 5 years ago.  The patient herself would usually have no idea that one had occurred.  She never had any aura or postictal state with these.  Witnesses would simply note that she suddenly stopped talking and stopped moving, with no response to voice or touch, but with eyes open.  She would not fall down or change posture during these brief seizures.  Her  thought that these consistently lasted less than 20 seconds, and he did not witness any postictal state.      The patient denied that she has ever had a paroxysmal episode of sudden brief confusion, non-convulsive falling with unconsciousness, repetitive involuntary movements or posturing, or other paroxysmal phenomena.  She denied any history of convulsive status epilepticus, or complex partial status epilepticus.  She denied any history of sudden involuntary jerks while fully awake, but she has had hypnic jerks.      The patient does not recognize exacerbating or remitting factors with regard to seizure frequency.      Epilepsy-seizure predispositions:   The patient has no family history of epilepsy or seizures.  She has no history of gestational or  injury, febrile convulsions, developmental delay, stroke, meningitis, encephalitis, significant head injury, or other epileptic predispositions.  She denied a history of physical or sexual abuse by an adult during her childhood or adulthood.      Laboratory evaluations:   Scanned records from [facility] indicate that a normal brain MRI was obtained on 2001.    Multiple interictal EEG recordings were interpreted as showing generalized spike wave discharges, but sometimes also as  showing left parietal or other left-sided focal spike-wave complexes, with the earliest EEG report dating to 2001 at [facility].      The patient had a prolonged outpatient video EEG on 2018, at the [facility], which showed a burst of approximately 3-per-second spike-wave discharges which was atypical due to variable absence of spikes in the presence of high amplitude slow waves and which also showed a single focal left centroparietotemporal spike-wave complex.     A brain MRI detected no cerebral abnormalities, but showed focal enlargement of the left optic chiasm without associated enhancement, [facility] on 2018.    Epilepsy therapeutics:   The patient remembers that initially after her first grand mal seizure, she was started directly on phenytoin and phenobarbital, which she found highly sedating.  Over the years she was converted to various combinations of clonazepam, carbamazepine, lamotrigine and levetiracetam.  For the last approximately 10 years, she has been on a combination of levetiracetam, lamotrigine and carbamazepine with intermittent periods of blurry or double vision lasting several hours.  These improved when she began to take her medications with meals, but she still has a severe episode at least once per month.      Other history:   The patient began having migraine headaches in her 30S, which she usually were left retro-orbital or right retro-orbital in alternation.  These were not associated with nausea, vomiting, photophobia or phonophobia.  They might last many hours if untreated, but they are markedly aborted with the use of Fioricet.  They have improved over the years and currently are occurring about once per month or less.     PAST MEDICAL-SURGICAL HISTORY:    1.  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     2.  Chronic migraine headaches.   3.  Status post  section.      FAMILY HISTORY:  There is no family history of seizures or epilepsy.  Her mother had recurrent migraine headaches, but there is no other history of neurologic disease in the family.        PERSONAL AND SOCIAL HISTORY:  The patient grew up in [place].  She completed her education with a bachelor's degree in mathematics at [employer].  She lives with her  and the younger 2 of their 3 sons.  She previously worked in business, primarily accounting.     She does not use alcohol, tobacco or illicit recreational drugs        REVIEW OF SYSTEMS:  The patient denied history of attention and memory disturbance, difficulties with comprehension and expression, difficulty in solving problems, weakness, tremors or other abnormal involuntary movements, numbness or tingling, incoordination, falling or difficulty in walking, urinary or fecal incontinence, headache and other pain, except as described above.  The patient denied any history of severe depression or suicidal ideation, severe anxiety, panic attacks, hallucinations, delusions, psychosis, substance abuse, or psychiatric hospitalization.  She denied rashes and easy bruising.  The remainder of a 14-system symptom review was negative except as noted above.       MEDICATIONS:  Levetiracetam 3000 mg b.i.d., lamotrigine 100/200 mg daily, carbamazepine extended release 200 mg b.i.d., and other medications as per the electronic medical record.     PHYSICAL EXAMINATION:    On examination, the patient was an alert woman in no apparent distress.  Vitals signs were as per the electronic medical record.  Skull was normocephalic and atraumatic.  Neck was supple, without signs of meningeal irritation.      On neurological examination the patient appeared alert and was fully oriented to person, place, time, and reason for visit.  Speech showed normal articulation, fluency, repetitions, naming, syntax and comprehension.  She accurately repeated digits sequences, repeating 8  forward and 5 reversed.  At 10 minutes from presentation, 4 of 4 phrases were recalled.  No apraxias or atavistic signs were elicited.  Fundoscopy was normal bilaterally.  Cranial nerves III through XII were normal.  Muscle masses, tones and strengths were normal throughout.  There was no pronator drift.  Sequential fine finger movements were performed normally with each hand.  No spontaneous tremors, myoclonus, or other abnormal movements were observed.  Sensations of light touch, pin prick, vibration and proprioception were reportedly normal throughout.  The rapid alternating movements, and finger-nose-finger and heel-shin maneuvers were performed normally bilaterally.  Romberg maneuver was negative.  Regular, heel, toe, tandem and reverse tandem walking were normal.  Deep tendon reflexes were normal and symmetric throughout.  Toes were downgoing bilaterally.      IMPRESSION:    The patient has maintained seizure control for a number of years.  She denied adverse effects currently, but in the past she has had AEs that are more characteristic of carbamazepine and lamotrigine AEs than of levetiracetam.  Her epilepsy syndrome generally is least responsive to carbamazepine, among the 3 agents.  Although she has been cautious about reducing AEDs, she may consider tapering off carbamazepine, if levels of the other two AEDs are adequate, and if the EEG shows suppression of epileptiform abnormalities.      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  She saw Dr. Luke Molina in Neurosurgery, who recommended follow up with serial imaging.  The non-enhancing abnormality was reported to be unchanged on an MRI on 03/26/2019.      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  She  strongly denied any history of myoclonus.  In addition to generalized spike-wave discharges, several electroencephalograms have shown left parietal or other left hemispheric spike-wave complexes.  Possibly she has atypical interictal electroencephalographic abnormalities and idiopathic generalized epilepsy, although another possibility would be a partial epilepsy with secondary bilateral synchrony, causing complex partial and secondarily generalized tonic-clonic seizures.  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       The patient has good migraine control, which she has followed with Dr. Shayy Sands.        The patient herself noted that she would not drive for 3 months if seizures recurred.  I reviewed [place] regulations on seizures and driving with the patient.  She appeared to clearly understand that she is prohibited from operating a motor vehicle within 3 months following any seizure or other episode with sudden unconsciousness or inability to sit up, and that she is required to report any future such seizure to the [facility] within 30 days after the event.  I also recommended that she and her family review all of her other activities, and avoid any activities that might lead to self-injury or injury of others, within 3 months following any seizure with impaired awareness or impaired motor control.  Such activities include but are not limited to holding babies or young children at heights from which they might be injured if dropped, bathing infants or young children in situations in which they might drown without continuous interactive care by an adult who is fully capable at all times during the bath, operating power cutting or other tools, handling firearms, exposure to heights from which she might fall, exposure to vessels with hot cooking oil or water, and tub-bathing  or swimming alone.      PLAN:    1.  Check carbamazepine/epoxide, lamotrigine and levetiracetam levels today.     2.  Continue levetiracetam 3000 mg b.i.d.  3.  Continue lamotrigine 100/200 mg daily.    4.  Continue carbamazepine extended release 200 mg b.i.d.   5.  Out-pt 3hr VEEG.   6.  Return to clinic in approximately 11 months.   I spent 25 minutes in this patient care, more than 50% of which consisted of counseling and coordinating care.     Yassine Eduardo M.D.   Professor of Neurology

## 2020-03-03 NOTE — NURSING NOTE
Chief Complaint   Patient presents with     RECHECK     [facility] RETURN - 1 YEAR FOLLOW UP       Preventive Care:    Breast Cancer Screening: During our visit today, we discussed that it is recommended you receive breast cancer screening. Please call or make an appointment with your primary care provider to discuss this with them. You may also call the  [facility] scheduling line ([phone]) to set up a mammography appointment at the Breast Center within the [facility].    Colorectal Cancer Screening: During our visit today, we discussed that it is recommended you receive colorectal cancer screening. Please call or make an appointment with your primary care provider to discuss this. You may also call the  [facility] scheduling line ([phone]) to set up a colonoscopy appointment.      Benji Singh, EMT

## 2020-03-04 LAB — LAMOTRIGINE SERPL-MCNC: 4.8 UG/ML (ref 2.5–15)

## 2020-03-05 LAB — LEVETIRACETAM SERPL-MCNC: 122 UG/ML (ref 12–46)

## 2020-03-06 RX ORDER — CARBAMAZEPINE 200 MG/1
CAPSULE, EXTENDED RELEASE ORAL
Qty: 60 CAPSULE | Refills: 11 | Status: SHIPPED | OUTPATIENT
Start: 2020-03-06 | End: 2021-02-02

## 2020-03-06 NOTE — PROGRESS NOTES
[facility]:  RETURN VISIT          Service Date: 03/03/2020    HISTORY:  Ms. Magaly Renee is a 59-year-old, right-handed woman who returned for follow-up of idiopathic generalized epilepsy with absence seizures and generalized tonic-clonic seizures.  She came to the visit today with her .      The patient reported that she has not had any seizures following the most recent visit to this clinic on 06/04/2019.  The most recent grand mal seizure occurred in 2016.  The most recent absence seizure occurred approximately in 2013.      She denied adverse effects of the AEDs.      Ictal semiology-history:   The patient reported that she only ever had 2 types of seizures in her lifetime, as grand mal seizures and absence seizures.      The grand mal seizures first occurred at 14 years of age.  She thinks that she probably had 8-12 total grand mal seizures widely spread out over the years, usually with 3-year intervals or longer between seizures.  The most recent of these have occurred in 2016.  These could arise from sleep or waking.  When awake, the patient never had any sort of aura or prodrome, but simply would find herself postictally down on the floor or ground with standing at onset with a severe headache, confusion and occasionally evidence of tongue biting or urinary incontinence.  Her  saw several of these events that arose from sleep in bed at night, described as rigid trunk and leg extension with arm flexion up to the chest and rhythmic generalized jerking with complete unresponsiveness, lasting about 1 minute.  He often noted confused postictal behaviors for 15 minutes or longer after a seizure.      The absence seizures probably began sometime in the early teens.  She often had clusters with multiple absences on the same day, but with many days or even months between seizures, as recently as a decade ago.  The episodes since have declined in frequency and the last  of these was witnessed, perhaps 5 years ago.  The patient herself would usually have no idea that one had occurred.  She never had any aura or postictal state with these.  Witnesses would simply note that she suddenly stopped talking and stopped moving, with no response to voice or touch, but with eyes open.  She would not fall down or change posture during these brief seizures.  Her  thought that these consistently lasted less than 20 seconds, and he did not witness any postictal state.      The patient denied that she has ever had a paroxysmal episode of sudden brief confusion, non-convulsive falling with unconsciousness, repetitive involuntary movements or posturing, or other paroxysmal phenomena.  She denied any history of convulsive status epilepticus, or complex partial status epilepticus.  She denied any history of sudden involuntary jerks while fully awake, but she has had hypnic jerks.      The patient does not recognize exacerbating or remitting factors with regard to seizure frequency.      Epilepsy-seizure predispositions:   The patient has no family history of epilepsy or seizures.  She has no history of gestational or  injury, febrile convulsions, developmental delay, stroke, meningitis, encephalitis, significant head injury, or other epileptic predispositions.  She denied a history of physical or sexual abuse by an adult during her childhood or adulthood.      Laboratory evaluations:   Scanned records from [facility] indicate that a normal brain MRI was obtained on 2001.    Multiple interictal EEG recordings were interpreted as showing generalized spike wave discharges, but sometimes also as showing left parietal or other left-sided focal spike-wave complexes, with the earliest EEG report dating to 2001 at [facility].      The patient had a prolonged outpatient video EEG on 2018, at the [facility], which showed a burst of approximately 3-per-second spike-wave  discharges which was atypical due to variable absence of spikes in the presence of high amplitude slow waves and which also showed a single focal left centroparietotemporal spike-wave complex.     A brain MRI detected no cerebral abnormalities, but showed focal enlargement of the left optic chiasm without associated enhancement, at the [facility] on 2018.    Epilepsy therapeutics:   The patient remembers that initially after her first grand mal seizure, she was started directly on phenytoin and phenobarbital, which she found highly sedating.  Over the years she was converted to various combinations of clonazepam, carbamazepine, lamotrigine and levetiracetam.  For the last approximately 10 years, she has been on a combination of levetiracetam, lamotrigine and carbamazepine with intermittent periods of blurry or double vision lasting several hours.  These improved when she began to take her medications with meals, but she still has a severe episode at least once per month.      Other history:   The patient began having migraine headaches in her 30S, which she usually were left retro-orbital or right retro-orbital in alternation.  These were not associated with nausea, vomiting, photophobia or phonophobia.  They might last many hours if untreated, but they are markedly aborted with the use of Fioricet.  They have improved over the years and currently are occurring about once per month or less.     PAST MEDICAL-SURGICAL HISTORY:    1.  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     2.  Chronic migraine headaches.   3.  Status post  section.     FAMILY HISTORY:  There is no family history of seizures or epilepsy.  Her mother had recurrent migraine headaches, but there is no other history of neurologic disease in the family.        PERSONAL AND SOCIAL HISTORY:  The patient grew up in [place].  She completed  her education with a bachelor's degree in mathematics at [employer].  She lives with her  and the younger 2 of their 3 sons.  She previously worked in business, primarily accounting.     She does not use alcohol, tobacco or illicit recreational drugs        REVIEW OF SYSTEMS:  The patient denied history of attention and memory disturbance, difficulties with comprehension and expression, difficulty in solving problems, weakness, tremors or other abnormal involuntary movements, numbness or tingling, incoordination, falling or difficulty in walking, urinary or fecal incontinence, headache and other pain, except as described above.  The patient denied any history of severe depression or suicidal ideation, severe anxiety, panic attacks, hallucinations, delusions, psychosis, substance abuse, or psychiatric hospitalization.  She denied rashes and easy bruising.  The remainder of a 14-system symptom review was negative except as noted above.       MEDICATIONS:  Levetiracetam 3000 mg b.i.d., lamotrigine 100/200 mg daily, carbamazepine extended release 200 mg b.i.d., and other medications as per the electronic medical record.     PHYSICAL EXAMINATION:    On examination, the patient was an alert woman in no apparent distress.  Vitals signs were as per the electronic medical record.  Skull was normocephalic and atraumatic.  Neck was supple, without signs of meningeal irritation.      On neurological examination the patient appeared alert and was fully oriented to person, place, time, and reason for visit.  Speech showed normal articulation, fluency, repetitions, naming, syntax and comprehension.  She accurately repeated digits sequences, repeating 8 forward and 5 reversed.  At 10 minutes from presentation, 4 of 4 phrases were recalled.  No apraxias or atavistic signs were elicited.  Fundoscopy was normal bilaterally.  Cranial nerves III through XII were normal.  Muscle masses, tones and strengths were normal  throughout.  There was no pronator drift.  Sequential fine finger movements were performed normally with each hand.  No spontaneous tremors, myoclonus, or other abnormal movements were observed.  Sensations of light touch, pin prick, vibration and proprioception were reportedly normal throughout.  The rapid alternating movements, and finger-nose-finger and heel-shin maneuvers were performed normally bilaterally.  Romberg maneuver was negative.  Regular, heel, toe, tandem and reverse tandem walking were normal.  Deep tendon reflexes were normal and symmetric throughout.  Toes were downgoing bilaterally.      IMPRESSION:    The patient has maintained seizure control for a number of years.  She denied adverse effects currently, but in the past she has had AEs that are more characteristic of carbamazepine and lamotrigine AEs than of levetiracetam.  Her epilepsy syndrome generally is least responsive to carbamazepine, among the 3 agents.  Although she has been cautious about reducing AEDs, she may consider tapering off carbamazepine, if levels of the other two AEDs are adequate, and if the EEG shows suppression of epileptiform abnormalities.      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  She saw Dr. Luke Molina in Neurosurgery, who recommended follow up with serial imaging.  The non-enhancing abnormality was reported to be unchanged on an MRI on 03/26/2019.      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  She strongly denied any history of myoclonus.  In addition to generalized spike-wave discharges, several electroencephalograms have shown left parietal or other left hemispheric spike-wave complexes.  Possibly she has atypical interictal electroencephalographic  abnormalities and idiopathic generalized epilepsy, although another possibility would be a partial epilepsy with secondary bilateral synchrony, causing complex partial and secondarily generalized tonic-clonic seizures.  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       The patient has good migraine control, which she has followed with Dr. Shayy Sands.        The patient herself noted that she would not drive for 3 months if seizures recurred.  I reviewed [place] regulations on seizures and driving with the patient.  She appeared to clearly understand that she is prohibited from operating a motor vehicle within 3 months following any seizure or other episode with sudden unconsciousness or inability to sit up, and that she is required to report any future such seizure to the [facility] within 30 days after the event.  I also recommended that she and her family review all of her other activities, and avoid any activities that might lead to self-injury or injury of others, within 3 months following any seizure with impaired awareness or impaired motor control.  Such activities include but are not limited to holding babies or young children at heights from which they might be injured if dropped, bathing infants or young children in situations in which they might drown without continuous interactive care by an adult who is fully capable at all times during the bath, operating power cutting or other tools, handling firearms, exposure to heights from which she might fall, exposure to vessels with hot cooking oil or water, and tub-bathing or swimming alone.      PLAN:    1.  Check carbamazepine/epoxide, lamotrigine and levetiracetam levels today.     2.  Continue levetiracetam 3000 mg b.i.d.  3.  Continue lamotrigine 100/200 mg daily.    4.  Continue carbamazepine extended release 200 mg  b.i.d.   5.  Out-pt 3hr VEEG.   6.  Return to clinic in approximately 11 months.   I spent 25 minutes in this patient care, more than 50% of which consisted of counseling and coordinating care.       Yassine Eduardo M.D.   Professor of Neurology

## 2020-03-10 LAB
CARBAMAZEPINE EP SERPL-MCNC: <0.2 UG/ML (ref 0.4–4)
CARBAMAZEPINE SERPL-MCNC: <0.5 UG/ML (ref 4–12)

## 2020-03-27 DIAGNOSIS — G43.009 MIGRAINE WITHOUT AURA AND WITHOUT STATUS MIGRAINOSUS, NOT INTRACTABLE: ICD-10-CM

## 2020-03-27 RX ORDER — BUTALBITAL, ACETAMINOPHEN AND CAFFEINE 50; 325; 40 MG/1; MG/1; MG/1
1 TABLET ORAL EVERY 4 HOURS PRN
Qty: 12 TABLET | Refills: 1 | Status: SHIPPED | OUTPATIENT
Start: 2020-03-27 | End: 2020-05-06

## 2020-03-27 NOTE — TELEPHONE ENCOUNTER
Requested Prescriptions   Pending Prescriptions Disp Refills     butalbital-acetaminophen-caffeine (ESGIC) -40 MG tablet 12 tablet 1     Sig: Take 1 tablet by mouth every 4 hours as needed for headaches or moderate to severe pain       There is no refill protocol information for this order        Last office visit: No previous visit found with prescribing provider:  Dr. Sands   Future Office Visit:      Denise Behrendt  Specialty CSS

## 2020-05-04 ENCOUNTER — TELEPHONE (OUTPATIENT)
Dept: NEUROLOGY | Facility: CLINIC | Age: 60
End: 2020-05-04

## 2020-05-04 DIAGNOSIS — G43.009 MIGRAINE WITHOUT AURA AND WITHOUT STATUS MIGRAINOSUS, NOT INTRACTABLE: ICD-10-CM

## 2020-05-04 NOTE — TELEPHONE ENCOUNTER
Last office visit with Dr. Sands noted to be 10/25/2018. Follows with Dr. Eduardo also for epilepsy.     Routed to Dr. Eduardo to see if he would be prescribing, or if Dr. Jaylin Sands manages medication. Per Dr. Eduardo, prescription handled by Dr. Jaylin Sands. Will route to her for consideration. Patient appears to be due for office visit.     Will advise of need for appointment.   Kale MORA RN   Specialty Clinics

## 2020-05-04 NOTE — TELEPHONE ENCOUNTER
Requested Prescriptions   Pending Prescriptions Disp Refills     butalbital-acetaminophen-caffeine (ESGIC) -40 MG tablet 12 tablet 1     Sig: Take 1 tablet by mouth every 4 hours as needed for headaches or moderate to severe pain       There is no refill protocol information for this order        Last office visit: 10/25/2018 with prescribing provider:  Dr. Sands   Future Office Visit:      Denise Behrendt  Specialty CSS

## 2020-05-07 RX ORDER — BUTALBITAL, ACETAMINOPHEN AND CAFFEINE 50; 325; 40 MG/1; MG/1; MG/1
1 TABLET ORAL EVERY 4 HOURS PRN
Qty: 12 TABLET | Refills: 1 | Status: SHIPPED | OUTPATIENT
Start: 2020-05-07 | End: 2021-02-02

## 2020-05-11 NOTE — TELEPHONE ENCOUNTER
Called pharmacy. Unable to escribe due to being controlled substance. Pharm reviewed chart. okay'd fill- will be filling    Kale MORA RN   Specialty Clinics

## 2020-06-23 ENCOUNTER — TELEPHONE (OUTPATIENT)
Dept: NEUROLOGY | Facility: CLINIC | Age: 60
End: 2020-06-23

## 2020-06-23 NOTE — TELEPHONE ENCOUNTER
Patient calls the office today asking for information on how to submit her DMV LOC form to Dr. Eduardo. I advised that she can send by mail, which she declined, send by fax at [phone] or by [employer] message. She states her  will be sending us the form. I asked her to be sure to fill out the entire patient portion of the form, including the date of last episode of LOC. She states it has been many years since she's had a seizure so I advised that she approximate or make a statement about it having been longer than x number of years. She verbalized understanding.

## 2020-06-23 NOTE — TELEPHONE ENCOUNTER
[facility] Call Center    Phone Message    May a detailed message be left on voicemail: yes     Reason for Call: Form or Letter   Type or form/letter needing completion: [place] [employer] DMV - Loss of Voluntary Control form.   Provider: Dr Eduardo  Date form needed: ASAP  Once completed: Fax form to:       ??      Pt has completed her portion of this form. She is wondering what the best way is to get this to Dr Eduardo to complete.     Please call her back to discuss.             Action Taken: Message routed to:  [facility] ([facility] Neurology    Travel Screening: Not Applicable

## 2020-07-17 ENCOUNTER — MYC MEDICAL ADVICE (OUTPATIENT)
Dept: NEUROLOGY | Facility: CLINIC | Age: 60
End: 2020-07-17

## 2020-07-20 ENCOUNTER — MYC MEDICAL ADVICE (OUTPATIENT)
Dept: NEUROLOGY | Facility: CLINIC | Age: 60
End: 2020-07-20

## 2020-11-22 ENCOUNTER — HEALTH MAINTENANCE LETTER (OUTPATIENT)
Age: 60
End: 2020-11-22

## 2020-12-02 DIAGNOSIS — G40.409 OTHER GENERALIZED EPILEPSY, NOT INTRACTABLE, WITHOUT STATUS EPILEPTICUS (H): ICD-10-CM

## 2020-12-04 RX ORDER — LEVETIRACETAM 1000 MG/1
TABLET ORAL
Qty: 180 TABLET | Refills: 11 | OUTPATIENT
Start: 2020-12-04

## 2020-12-04 NOTE — TELEPHONE ENCOUNTER
LEVETIRACETAM 1,000 MG TABLET   Disp Refills Start End RAMESH   levETIRAcetam (KEPPRA) 1000 MG tablet 180 tablet 11 3/3/2020  No   Sig: Take 3 tablets (3000mg) two times daily.   Sent to pharmacy as: levETIRAcetam (KEPPRA) 1000 MG tablet   Class: E-Prescribe   Order: [id number]   E-Prescribing Status: Receipt confirmed by pharmacy (3/3/2020 10:20 AM CST)   Printout Tracking    External Result Report   Medication Administration Instructions    Take 3 tablets (3000mg) two times daily.   Pharmacy    [id number] - [place] [id number]         Puja Curtis RN  Central Triage Red Flags/Med Refills

## 2020-12-07 DIAGNOSIS — G40.409 OTHER GENERALIZED EPILEPSY, NOT INTRACTABLE, WITHOUT STATUS EPILEPTICUS (H): ICD-10-CM

## 2020-12-07 RX ORDER — LEVETIRACETAM 1000 MG/1
TABLET ORAL
Qty: 540 TABLET | Refills: 0 | OUTPATIENT
Start: 2020-12-07

## 2020-12-07 NOTE — TELEPHONE ENCOUNTER
Refill requested for: Received request for 90 day conversion of Levetiracetam order.    Last ordered: 3/3/20    Last Filled:     Last Clinic Visit: 3/3/20    Next Clinic Visit: 2/2/21    Labs:  3/3/20 Levetiracetam = 122    Fr   PLAN:    1.  Check carbamazepine/epoxide, lamotrigine and levetiracetam levels today.     2.  Continue levetiracetam 3000 mg b.i.d.  3.  Continue lamotrigine 100/200 mg daily.    4.  Continue carbamazepine extended release 200 mg b.i.d.   5.  Out-pt 3hr VEEG.   6.  Return to clinic in approximately 11 months. om last visit note:     Action taken:  Pended to Dr. Eduardo

## 2020-12-09 RX ORDER — LEVETIRACETAM 1000 MG/1
TABLET ORAL
Qty: 540 TABLET | Refills: 0 | Status: SHIPPED | OUTPATIENT
Start: 2020-12-09 | End: 2021-02-02

## 2021-01-12 ENCOUNTER — DOCUMENTATION ONLY (OUTPATIENT)
Dept: CARE COORDINATION | Facility: CLINIC | Age: 61
End: 2021-01-12

## 2021-02-02 ENCOUNTER — VIRTUAL VISIT (OUTPATIENT)
Dept: NEUROLOGY | Facility: CLINIC | Age: 61
End: 2021-02-02
Payer: COMMERCIAL

## 2021-02-02 DIAGNOSIS — G40.319 INTRACTABLE GENERALIZED EPILEPSY (H): ICD-10-CM

## 2021-02-02 DIAGNOSIS — G40.409 OTHER GENERALIZED EPILEPSY, NOT INTRACTABLE, WITHOUT STATUS EPILEPTICUS (H): ICD-10-CM

## 2021-02-02 PROCEDURE — 99214 OFFICE O/P EST MOD 30 MIN: CPT | Mod: 95 | Performed by: PSYCHIATRY & NEUROLOGY

## 2021-02-02 RX ORDER — LEVETIRACETAM 1000 MG/1
2000 TABLET ORAL 2 TIMES DAILY
Qty: 360 TABLET | Refills: 3 | Status: SHIPPED | OUTPATIENT
Start: 2021-02-02 | End: 2021-03-12

## 2021-02-02 RX ORDER — LAMOTRIGINE 100 MG/1
TABLET ORAL
Qty: 270 TABLET | Refills: 3 | Status: SHIPPED | OUTPATIENT
Start: 2021-02-02 | End: 2021-08-10

## 2021-02-02 NOTE — PROGRESS NOTES
Karen Renee is a 60 year old who is being evaluated via a billable video visit.    How would you like to obtain your AVS? Mychart  If the video visit is dropped, the invitation should be resent by: [phone]  GIANNA Winter      EPILEPSY CLINIC VIDEO VISIT NOTE  The scheduled clinic visit was changed to a video visit to reduce the risk of COVID-19 transmission.           Service Date: 02/02/2021    HISTORY: I spoke with Ms. Magaly Renee and her .  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      The patient denied having recent fever or cough, and exposure to anyone thought to have COVID-19.     Following the most recent visit to this clinic on 03/03/2020, the patient reportedly has had no seizures of any type.    The most recent grand mal seizure occurred in 2016.    The most recent absence seizure occurred approximately in 2013.      She denied adverse effects of the AEDs.      She has not had any recent migraine headaches.  She occasionally has mild aching headaches that rapidly resolve with one dose of an OTC analgesic.      MEDICATIONS:  Levetiracetam 3000 mg b.i.d., lamotrigine 100/200 mg daily, and other medications as per the electronic medical record.      VIDEO OBSERVATIONS:   The patient spoke with normal articulation, fluency and syntax, with normal comprehension of questions.    On command, the patient moved the direction of gaze into all 4 quadrants conjugately and without nystagmus.   Facial movements were normal.    Tongue protruded on the midline.    The patient did not display any resting tremors or action tremors of the outstretched arms.  Limb movements were normal.      IMPRESSION:   The patient has had full seizure control for longer than 4 years.  She does not appear to have adverse effects of levetiracetam and lamotrigine.  I told her that she is on a high  dose of levetiracetam, which was used during tapering off carbamazepine.  In the absence of recent seizures, I recommended reducing the dose of levetiracetam, and she agreed with this.  We will check AED levels after this dose reduction.      The patient had several questions about how to dispose of old medications that she is no longer using.  I recommended that she should take these to her pharmacy for disposal.       PLAN:   1)  Decrease levetiracetam to 2000 mg b.i.d.   2)  Continue lamotrigine at the current dose.   3)  Obtain levetiracetam and lamotrigine in approximately 2 weeks.   4)  Return in 6 months for video visit.    Video Conference via [employer].   Video Start Time: 10:00 a.m.   Video Stop Time: 10:25 a.m.   Provider location: [facility]   Reported Patient Location: Home     I personally spent 35 minutes in this patient care on the day of this visit, including time in direct contact with the patient of which more than 50% consisted of counseling and coordinating care, and time in other necessary patient care activities without direct patient contact including medical record and imaging review.    Yassine Eduardo M.D., Professor of Neurology

## 2021-02-02 NOTE — LETTER
2/2/2021       RE: Magaly Renee  [address] [place] [address]     Dear Colleague,    Thank you for referring your patient, Magaly Renee, to the [facility] [place] at [facility]. Please see a copy of my visit note below.    Karen Renee is a 60 year old who is being evaluated via a billable video visit.    How would you like to obtain your AVS? Mychart  If the video visit is dropped, the invitation should be resent by: [phone]  GIANNA Winter      EPILEPSY CLINIC VIDEO VISIT NOTE  The scheduled clinic visit was changed to a video visit to reduce the risk of COVID-19 transmission.           Service Date: 02/02/2021    HISTORY: I spoke with Ms. Magaly Renee and her .  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    The patient denied having recent fever or cough, and exposure to anyone thought to have COVID-19.     Following the most recent visit to this clinic on 03/03/2020, the patient reportedly has had no seizures of any type.    The most recent grand mal seizure occurred in 2016.    The most recent absence seizure occurred approximately in 2013.      She denied adverse effects of the AEDs.      She has not had any recent migraine headaches.  She occasionally has mild aching headaches that rapidly resolve with one dose of an OTC analgesic.      MEDICATIONS:  Levetiracetam 3000 mg b.i.d., lamotrigine 100/200 mg daily, and other medications as per the electronic medical record.      VIDEO OBSERVATIONS:   The patient spoke with normal articulation, fluency and syntax, with normal comprehension of questions.    On command, the patient moved the direction of gaze into all 4 quadrants conjugately and without nystagmus.   Facial movements were normal.    Tongue protruded on the midline.    The patient  did not display any resting tremors or action tremors of the outstretched arms.  Limb movements were normal.      IMPRESSION:   The patient has had full seizure control for longer than 4 years.  She does not appear to have adverse effects of levetiracetam and lamotrigine.  I told her that she is on a high dose of levetiracetam, which was used during tapering off carbamazepine.  In the absence of recent seizures, I recommended reducing the dose of levetiracetam, and she agreed with this.  We will check AED levels after this dose reduction.      The patient had several questions about how to dispose of old medications that she is no longer using.  I recommended that she should take these to her pharmacy for disposal.       PLAN:   1)  Decrease levetiracetam to 2000 mg b.i.d.   2)  Continue lamotrigine at the current dose.   3)  Obtain levetiracetam and lamotrigine in approximately 2 weeks.   4)  Return in 6 months for video visit.    Video Conference via [employer].   Video Start Time: 10:00 a.m.   Video Stop Time: 10:25 a.m.   Provider location: [facility]   Reported Patient Location: Home     I personally spent 35 minutes in this patient care on the day of this visit, including time in direct contact with the patient of which more than 50% consisted of counseling and coordinating care, and time in other necessary patient care activities without direct patient contact including medical record and imaging review.      Yassine Eduardo M.D., Professor of Neurology

## 2021-02-13 ENCOUNTER — HEALTH MAINTENANCE LETTER (OUTPATIENT)
Age: 61
End: 2021-02-13

## 2021-03-12 DIAGNOSIS — G40.409 OTHER GENERALIZED EPILEPSY, NOT INTRACTABLE, WITHOUT STATUS EPILEPTICUS (H): ICD-10-CM

## 2021-03-12 RX ORDER — LEVETIRACETAM 1000 MG/1
2000 TABLET ORAL 2 TIMES DAILY
Qty: 360 TABLET | Refills: 3 | Status: SHIPPED | OUTPATIENT
Start: 2021-03-12 | End: 2021-03-23

## 2021-03-12 NOTE — TELEPHONE ENCOUNTER
[facility] Call Center    Phone Message    May a detailed message be left on voicemail: yes     Reason for Call: Medication Question or concern regarding medication   Prescription Clarification  Name of Medication: levETIRAcetam (KEPPRA) 1000 MG tablet [[id number]] (Order [id number])  Prescribing Provider: Dr. Eduardo   Pharmacy: [facility]/PHARMACY #[id number] - [place] [id number]   What on the order needs clarification? Pharmacy calling in regards to orders they received today states that there are 2 different directions - one says take 2 tablets twice a day and then it says take 3 tablets twice a day. Wanting to get clarification on which one is correct.     Please advise and call pharmacy back to clarify directions at your earliest convenience     Action Taken: Other: ME MINCEP     Travel Screening: Not Applicable

## 2021-03-23 ENCOUNTER — MYC MEDICAL ADVICE (OUTPATIENT)
Dept: NEUROLOGY | Facility: CLINIC | Age: 61
End: 2021-03-23

## 2021-03-23 DIAGNOSIS — G40.409 OTHER GENERALIZED EPILEPSY, NOT INTRACTABLE, WITHOUT STATUS EPILEPTICUS (H): ICD-10-CM

## 2021-03-23 RX ORDER — LEVETIRACETAM 1000 MG/1
TABLET ORAL
Qty: 450 TABLET | Refills: 3 | Status: SHIPPED | OUTPATIENT
Start: 2021-03-23 | End: 2021-08-10

## 2021-04-12 ENCOUNTER — IMMUNIZATION (OUTPATIENT)
Dept: LAB | Facility: CLINIC | Age: 61
End: 2021-04-12
Payer: COMMERCIAL

## 2021-05-03 ENCOUNTER — TELEPHONE (OUTPATIENT)
Dept: NEUROLOGY | Facility: CLINIC | Age: 61
End: 2021-05-03

## 2021-05-03 NOTE — TELEPHONE ENCOUNTER
Spoke to pt, who will have her  call back to schedule.    Pt needs to be scheduled for 6 month follow-up with Dr Eduardo, via video, around 8/2/21

## 2021-05-27 ENCOUNTER — RECORDS - HEALTHEAST (OUTPATIENT)
Dept: ADMINISTRATIVE | Facility: CLINIC | Age: 61
End: 2021-05-27

## 2021-05-31 ENCOUNTER — RECORDS - HEALTHEAST (OUTPATIENT)
Dept: ADMINISTRATIVE | Facility: CLINIC | Age: 61
End: 2021-05-31

## 2021-06-30 ENCOUNTER — HOSPITAL ENCOUNTER (EMERGENCY)
Facility: CLINIC | Age: 61
Discharge: HOME | End: 2021-06-30
Attending: EMERGENCY MEDICINE | Admitting: EMERGENCY MEDICINE
Payer: COMMERCIAL

## 2021-06-30 VITALS
TEMPERATURE: 98.3 F | DIASTOLIC BLOOD PRESSURE: 77 MMHG | HEART RATE: 78 BPM | RESPIRATION RATE: 18 BRPM | SYSTOLIC BLOOD PRESSURE: 133 MMHG | OXYGEN SATURATION: 94 %

## 2021-06-30 DIAGNOSIS — M54.16 LUMBAR BACK PAIN WITH RADICULOPATHY AFFECTING RIGHT LOWER EXTREMITY: ICD-10-CM

## 2021-06-30 PROCEDURE — 99284 EMERGENCY DEPT VISIT MOD MDM: CPT | Performed by: EMERGENCY MEDICINE

## 2021-06-30 PROCEDURE — 99282 EMERGENCY DEPT VISIT SF MDM: CPT | Performed by: EMERGENCY MEDICINE

## 2021-06-30 RX ORDER — OXYCODONE HYDROCHLORIDE 5 MG/1
5 TABLET ORAL EVERY 6 HOURS PRN
Qty: 12 TABLET | Refills: 0 | Status: SHIPPED | OUTPATIENT
Start: 2021-06-30 | End: 2021-07-12

## 2021-06-30 NOTE — DISCHARGE INSTRUCTIONS
Ibuprofen 600-800 mg every 6 hours as needed, acetaminophen 1000 mg every 6 hours as needed    Oxycodone for severe pain, do not use alcohol or drive if using oxycodone, use stool softener if you tend towards constipation while taking oxycodone    Follow-up primary care for further evaluation, possible referral for physical therapy.    Return here for fever, numbness of the crotch, weakness, loss control of bowel or bladder or any other concern

## 2021-06-30 NOTE — ED PROVIDER NOTES
History     Chief Complaint   Patient presents with     Back Pain     low back pain radiating down right buttocks to right knee.  no trauma or chronic back pain      HPI  Magaly Renee is a 60 year old female who presents with low back pain radiating to right buttock and right thigh ongoing for the past week no inciting trauma or strain no similar pain in the past.  Described as moderate, worse with certain movements.  Tried to stretch it out with minimal relief.  OTC meds minimal relief.  Denies loss control bowel or bladder, fever, saddle anesthesia or weakness.    Allergies:  Allergies   Allergen Reactions     Bee Venom Anaphylaxis     Sulfa Drugs Other (See Comments)       Problem List:    Patient Active Problem List    Diagnosis Date Noted     Disorder of optic chiasm 2018     Priority: Medium     Migraine headache 2012     Priority: Medium     2012, Hx of migraine for > 6 years, used to follow with Dr Benja coelho, [facility], for years, he is in [place] ?, need meds refill of Fioricet with codeine.       Persistent Leukopenia 2008     Priority: Medium     ?Keppra vs other.  Check peripheral smear and consider hematology       Other generalized epilepsy, not intractable, without status epilepticus (H) 2006     Priority: Medium     Last Exam: 2006  Medications: Keppra, Carbatrol  Comments: Last seizure   Typically sees neurologist at [facility]  Problem list name updated by automated process. Provider to review          Past Medical History:    Past Medical History:   Diagnosis Date     Localization-related epilepsy (H)      Migraine      Migraine, unspecified, without mention of intractable migraine without mention of status migrainosus 2006     Other convulsions age of 12       Past Surgical History:    Past Surgical History:   Procedure Laterality Date     C/SECTION, LOW TRANSVERSE      , Low Transverse       Family  History:    Family History   Problem Relation Age of Onset     Neurologic Disorder Mother         migraine     Diabetes No family hx of      C.A.D. No family hx of      Hypertension No family hx of      Cancer - colorectal No family hx of      Prostate Cancer No family hx of      Breast Cancer No family hx of      Cerebrovascular Disease No family hx of        Social History:  Marital Status:   [2]  Social History     Tobacco Use     Smoking status: Never Smoker     Smokeless tobacco: Never Used   Substance Use Topics     Alcohol use: Yes     Comment: occassion     Drug use: No        Medications:    oxyCODONE (ROXICODONE) 5 MG tablet  aspirin 325 MG tablet  lamoTRIgine (LAMICTAL) 100 MG tablet  levETIRAcetam (KEPPRA) 1000 MG tablet          Review of Systems  Problem focused review of systems otherwise negative      Physical Exam   BP: 133/77  Pulse: 78  Temp: 98.3  F (36.8  C)  Resp: 18  SpO2: 94 %      Physical Exam  Nontoxic-appearing no respiratory distress alert and oriented  Skin pink warm and dry  Head atraumatic normocephalic  Strength 5/5 throughout lower extremities, positive leg raise on the right, DTRs 1/4 at the knees, sensation intact to light touch  ED Course        Procedures               Critical Care time:  none               No results found for this or any previous visit (from the past 24 hour(s)).    Medications - No data to display    Assessments & Plan (with Medical Decision Making)  Atraumatic low back pain, right radiculopathy.  No red flags by history or exam.  Discussed all options with respect to symptom treatment.  Recommend acetaminophen/ibuprofen, oxycodone prescription warned regarding side effects.  Follow-up primary care for referral to PT or chiropractor, possible MRI, possible JONNY.  Return criteria reviewed     I have reviewed the nursing notes.    I have reviewed the findings, diagnosis, plan and need for follow up with the patient.          Discharge Medication List as  of 6/30/2021  8:08 AM      START taking these medications    Details   oxyCODONE (ROXICODONE) 5 MG tablet Take 1 tablet (5 mg) by mouth every 6 hours as needed for severe pain, Disp-12 tablet, R-0, Local Print             Final diagnoses:   Lumbar back pain with radiculopathy affecting right lower extremity       6/30/2021   [facility] EMERGENCY DEPT     Tye Perkins MD  06/30/21 0909

## 2021-06-30 NOTE — ED NOTES
Patient c/o low back pain radiating down right lower back to buttocks and down to lateral side of right knee.  No recent injury.  States has never had this happen before.  No numbness or tingling to extremities.

## 2021-07-12 ENCOUNTER — ANCILLARY PROCEDURE (OUTPATIENT)
Dept: GENERAL RADIOLOGY | Facility: CLINIC | Age: 61
End: 2021-07-12
Attending: PHYSICIAN ASSISTANT
Payer: COMMERCIAL

## 2021-07-12 ENCOUNTER — OFFICE VISIT (OUTPATIENT)
Dept: FAMILY MEDICINE | Facility: CLINIC | Age: 61
End: 2021-07-12
Payer: COMMERCIAL

## 2021-07-12 VITALS
BODY MASS INDEX: 24.8 KG/M2 | SYSTOLIC BLOOD PRESSURE: 105 MMHG | TEMPERATURE: 97.5 F | DIASTOLIC BLOOD PRESSURE: 68 MMHG | HEIGHT: 67 IN | WEIGHT: 158 LBS | RESPIRATION RATE: 15 BRPM | HEART RATE: 70 BPM | OXYGEN SATURATION: 97 %

## 2021-07-12 DIAGNOSIS — M54.16 LUMBAR RADICULOPATHY: ICD-10-CM

## 2021-07-12 DIAGNOSIS — M25.551 HIP PAIN, RIGHT: ICD-10-CM

## 2021-07-12 DIAGNOSIS — M54.41 RIGHT-SIDED LOW BACK PAIN WITH RIGHT-SIDED SCIATICA, UNSPECIFIED CHRONICITY: ICD-10-CM

## 2021-07-12 DIAGNOSIS — M54.41 RIGHT-SIDED LOW BACK PAIN WITH RIGHT-SIDED SCIATICA, UNSPECIFIED CHRONICITY: Primary | ICD-10-CM

## 2021-07-12 PROCEDURE — 72100 X-RAY EXAM L-S SPINE 2/3 VWS: CPT | Mod: FY | Performed by: RADIOLOGY

## 2021-07-12 PROCEDURE — 99214 OFFICE O/P EST MOD 30 MIN: CPT | Performed by: PHYSICIAN ASSISTANT

## 2021-07-12 PROCEDURE — 72170 X-RAY EXAM OF PELVIS: CPT | Mod: RT | Performed by: RADIOLOGY

## 2021-07-12 RX ORDER — CYCLOBENZAPRINE HCL 10 MG
10 TABLET ORAL 3 TIMES DAILY PRN
Qty: 30 TABLET | Refills: 1 | Status: SHIPPED | OUTPATIENT
Start: 2021-07-12 | End: 2022-10-19

## 2021-07-12 RX ORDER — OXYCODONE AND ACETAMINOPHEN 5; 325 MG/1; MG/1
1 TABLET ORAL EVERY 6 HOURS PRN
Qty: 12 TABLET | Refills: 0 | Status: SHIPPED | OUTPATIENT
Start: 2021-07-12 | End: 2021-08-02

## 2021-07-12 ASSESSMENT — MIFFLIN-ST. JEOR: SCORE: 1324.31

## 2021-07-12 NOTE — PATIENT INSTRUCTIONS
physical therapy  Stretches at home  Heat/ice  Can use percocet as needed (short term) instead of oxycodone  Can use ibuprofen up to 800 mg every 8 hours as well  Sent in muscle relaxer flexeril - can make you groggy, try this at night first  MRI: Please contact [facility]  at [phone] to schedule appointment  Check with insurance coverage first  Spine specialist referral    Follow up for your physical

## 2021-07-12 NOTE — PROGRESS NOTES
Assessment & Plan     ASSESSMENT/PLAN:      ICD-10-CM    1. Right-sided low back pain with right-sided sciatica, unspecified chronicity  M54.41 XR Lumbar Spine 2/3 Views     oxyCODONE-acetaminophen (PERCOCET) 5-325 MG tablet     MR Lumbar Spine w/o Contrast     [facility] Referral     cyclobenzaprine (FLEXERIL) 10 MG tablet     Spine Referral   2. Hip pain, right  M25.551 XR Pelvis 1/2 Views   3. Lumbar radiculopathy  M54.16 XR Lumbar Spine 2/3 Views       Patient Instructions   physical therapy  Stretches at home  Heat/ice  Can use percocet as needed (short term) instead of oxycodone  Can use ibuprofen up to 800 mg every 8 hours as well  Sent in muscle relaxer flexeril - can make you groggy, try this at night first  MRI: Please contact [facility]  at [phone] to schedule appointment  Check with insurance coverage first  Spine specialist referral    Follow up for your physical    32 minutes spent on the date of the encounter doing chart review, history and exam, documentation and further activities per the note    Return in about 2 weeks (around 7/26/2021) for if not improving or if worsening.    Michelle Arguelles PA-C  [facility] JINNY Jones is a 60 year old who presents for the following health issues     HPI     ED/UC Followup:    Facility:  [facility] ED  Date of visit: 6/30/2021  Reason for visit: Low back pain with radiation down the right side of buttox and upper thigh  Current Status: States that pain has not been getting better, denies any new symptoms     Pain almost 3 weeks  No injury. Typically she bikes, light weights. Now difficulty with light workouts and stretching  Patient denies red flag signs such as bowel/bladder changes, paresthesias in groin, radiation, weakness, history of cancer or IV drug use   Back of thigh, as well as hip flexor, inner thigh      Review of Systems   Other than noted above, general,  "HEENT, respiratory, cardiac, MS, and gastrointestinal systems are negative.       Objective    /68   Pulse 70   Temp 97.5  F (36.4  C) (Tympanic)   Resp 15   Ht 1.702 m (5' 7\")   Wt 71.7 kg (158 lb)   LMP 07/06/2016 (LMP Unknown)   SpO2 97%   BMI 24.75 kg/m    Body mass index is 24.75 kg/m .  Physical Exam   GENERAL: healthy, alert and no distress  HENT: ear canals and TM's normal, nose and mouth without ulcers or lesions  NECK: no adenopathy, no asymmetry, masses, or scars and thyroid normal to palpation  RESP: lungs clear to auscultation - no rales, rhonchi or wheezes  CV: regular rate and rhythm, normal S1 S2, no S3 or S4, no murmur, click or rub, no peripheral edema and peripheral pulses strong  ABDOMEN: soft, nontender, no hepatosplenomegaly, no masses and bowel sounds normal  MS: no gross musculoskeletal defects noted, no edema  Comprehensive back pain exam:  Tenderness of right paralumbar muscles, right SI area, Pain limits the following motions: twisting, extension, Lower extremity strength functional and equal on both sides, Lower extremity reflexes within normal limits bilaterally, Lower extremity sensation normal and equal on both sides and Straight leg raise negative bilaterally  ORTHO: Hip Exam: Palpation: Tender:   right proximal hamstring attachment  Range of Motion:  Full ROM, both hips - but painful ROM  Strength:  full strength        XR Lumbar Spine 2/3 Views    Result Date: 7/12/2021  LUMBAR SPINE TWO TO THREE VIEWS   7/12/2021 8:38 AM HISTORY: Low back pain with right radiculopathy x3 weeks. Lumbar radiculopathy. Right-sided low back pain with right-sided sciatica, unspecified chronicity. COMPARISON: None.     IMPRESSION: There is a T12 compression fracture of uncertain chronicity. This could be acute or chronic. If indicated, CT of the thoracic spine might be helpful in further evaluation. Lumbar vertebral body heights are maintained. Posterior alignment is normal. Mild rightward " convex lateral curvature is present. There is mild/moderate multilevel degenerative disc and facet disease. ALEKSANDR ROWLAND MD   SYSTEM ID:  [id number]    XR Pelvis 1/2 Views    Result Date: 7/12/2021  XR PELVIS 1/2 VW 7/12/2021 8:38 AM HISTORY: right hip pain x2 weeks; Hip pain, right     IMPRESSION: Negative exam. ADOLFO RODRIGES MD   SYSTEM ID:  [id number]

## 2021-07-16 ENCOUNTER — THERAPY VISIT (OUTPATIENT)
Dept: PHYSICAL THERAPY | Facility: CLINIC | Age: 61
End: 2021-07-16
Attending: PHYSICIAN ASSISTANT
Payer: COMMERCIAL

## 2021-07-16 DIAGNOSIS — M54.41 RIGHT-SIDED LOW BACK PAIN WITH RIGHT-SIDED SCIATICA, UNSPECIFIED CHRONICITY: ICD-10-CM

## 2021-07-16 PROCEDURE — 97110 THERAPEUTIC EXERCISES: CPT | Mod: GP | Performed by: PHYSICAL THERAPIST

## 2021-07-16 PROCEDURE — 97140 MANUAL THERAPY 1/> REGIONS: CPT | Mod: GP | Performed by: PHYSICAL THERAPIST

## 2021-07-16 PROCEDURE — 97162 PT EVAL MOD COMPLEX 30 MIN: CPT | Mod: GP | Performed by: PHYSICAL THERAPIST

## 2021-07-16 NOTE — PROGRESS NOTES
Physical Therapy Initial Evaluation  Subjective:  The history is provided by the patient and medical records.   Patient Health History  Magaly Rneee being seen for Back pain.     Date of Onset: Mid June,no injury.      Pain is reported as 7/10 on pain scale.       Red flags:  None as reported by patient.         Current medications:  Anti-seizure medication, muscle relaxants and pain medication.                         Therapist Generated HPI Evaluation  Problem details: Normally likes to walk and bike for exercise.  Went on a 45 min walk yesterday and increased low back pain from this..         Type of problem:  Sacroiliac and lumbar.    This is a new condition.  Condition occurred with:  Insidious onset.  Where condition occurred: for unknown reasons.    Pain is described as aching and is intermittent.  Pain radiates to:  Thigh right. Pain is worse during the day.  Since onset symptoms are unchanged.  Symptoms are exacerbated by walking    Special tests included:  X-ray.    Barriers include:  None as reported by patient.                        Objective:  System         Lumbar/SI Evaluation  ROM:    AROM Lumbar:   Flexion:          Hands to ankles no pain but has pain on right side with supine hip flexion  Ext:                    Moderate loss, increased pain on right, improved motion with reps   Side Bend:        Left:     Right:   Rotation:           Left:     Right:   Side Glide:        Left:  Good    Right:  Good                Lumbar Dermtomes:  normal                Neural Tension/Mobility:      Left side:SLR  negative.     Right side:   SLR  negative.   Lumbar Palpation:    Tenderness present at Left:    PSIS and Iliac Crest  Tenderness present at Right: PSIS and Iliac Crest        SI joint/Sacrum:          Left negative at:    Forward bend standing    Right negative at:  Forward bend standing    Sacral conclusion right:  Anterior inominate                                             General      ROS    Assessment/Plan:    Patient is a 60 year old female with lumbar complaints.    Patient has the following significant findings with corresponding treatment plan.                Diagnosis 1:  Mechanical low back pain with radiculopathy  Pain -  manual therapy and education  Decreased ROM/flexibility - manual therapy and therapeutic exercise    Therapy Evaluation Codes:   1) History comprised of:   Personal factors that impact the plan of care:      None.    Comorbidity factors that impact the plan of care are:      None.     Medications impacting care: Muscle relaxant and Pain.  2) Examination of Body Systems comprised of:   Body structures and functions that impact the plan of care:      Lumbar spine and Sacral illiac joint.   Activity limitations that impact the plan of care are:      Bending, Sitting and Walking.  3) Clinical presentation characteristics are:   Evolving/Changing.  4) Decision-Making    Moderate complexity using standardized patient assessment instrument and/or measureable assessment of functional outcome.  Cumulative Therapy Evaluation is: Moderate complexity.    Previous and current functional limitations:  (See Goal Flow Sheet for this information)    Short term and Long term goals: (See Goal Flow Sheet for this information)     Communication ability:  Patient appears to be able to clearly communicate and understand verbal and written communication and follow directions correctly.  Treatment Explanation - The following has been discussed with the patient:   RX ordered/plan of care  Anticipated outcomes  Possible risks and side effects  This patient would benefit from PT intervention to resume normal activities.   Rehab potential is excellent.    Frequency:  1 X week, once daily  Duration:  for 4 weeks  Discharge Plan:  Achieve all LTG.  Independent in home treatment program.  Reach maximal therapeutic benefit.    Please refer to the daily flowsheet for treatment today, total treatment  time and time spent performing 1:1 timed codes.

## 2021-07-23 ENCOUNTER — THERAPY VISIT (OUTPATIENT)
Dept: PHYSICAL THERAPY | Facility: CLINIC | Age: 61
End: 2021-07-23
Attending: PHYSICIAN ASSISTANT
Payer: COMMERCIAL

## 2021-07-23 DIAGNOSIS — M54.41 RIGHT-SIDED LOW BACK PAIN WITH RIGHT-SIDED SCIATICA, UNSPECIFIED CHRONICITY: Primary | ICD-10-CM

## 2021-07-23 PROCEDURE — 97110 THERAPEUTIC EXERCISES: CPT | Mod: GP | Performed by: PHYSICAL THERAPIST

## 2021-07-30 ENCOUNTER — THERAPY VISIT (OUTPATIENT)
Dept: PHYSICAL THERAPY | Facility: CLINIC | Age: 61
End: 2021-07-30
Attending: PHYSICIAN ASSISTANT
Payer: COMMERCIAL

## 2021-07-30 DIAGNOSIS — M54.41 RIGHT-SIDED LOW BACK PAIN WITH RIGHT-SIDED SCIATICA, UNSPECIFIED CHRONICITY: Primary | ICD-10-CM

## 2021-07-30 PROCEDURE — 97110 THERAPEUTIC EXERCISES: CPT | Mod: GP | Performed by: PHYSICAL THERAPIST

## 2021-08-02 DIAGNOSIS — M54.41 RIGHT-SIDED LOW BACK PAIN WITH RIGHT-SIDED SCIATICA, UNSPECIFIED CHRONICITY: ICD-10-CM

## 2021-08-02 RX ORDER — OXYCODONE AND ACETAMINOPHEN 5; 325 MG/1; MG/1
TABLET ORAL
Qty: 12 TABLET | Refills: 0 | Status: SHIPPED | OUTPATIENT
Start: 2021-08-02 | End: 2021-08-27

## 2021-08-02 NOTE — TELEPHONE ENCOUNTER
Routing refill request to provider for review/approval because:  Drug not on the [facility] refill protocol     William Hummel RN

## 2021-08-10 ENCOUNTER — VIRTUAL VISIT (OUTPATIENT)
Dept: NEUROLOGY | Facility: CLINIC | Age: 61
End: 2021-08-10
Payer: COMMERCIAL

## 2021-08-10 DIAGNOSIS — G40.319 INTRACTABLE GENERALIZED EPILEPSY (H): ICD-10-CM

## 2021-08-10 DIAGNOSIS — G40.409 OTHER GENERALIZED EPILEPSY, NOT INTRACTABLE, WITHOUT STATUS EPILEPTICUS (H): Primary | ICD-10-CM

## 2021-08-10 PROCEDURE — 99213 OFFICE O/P EST LOW 20 MIN: CPT | Mod: 95 | Performed by: PSYCHIATRY & NEUROLOGY

## 2021-08-10 RX ORDER — LEVETIRACETAM 1000 MG/1
TABLET ORAL
Qty: 540 TABLET | Refills: 3 | Status: SHIPPED | OUTPATIENT
Start: 2021-08-10 | End: 2022-01-11

## 2021-08-10 RX ORDER — LAMOTRIGINE 100 MG/1
TABLET ORAL
Qty: 270 TABLET | Refills: 3 | Status: SHIPPED | OUTPATIENT
Start: 2021-08-10 | End: 2022-01-11

## 2021-08-10 NOTE — LETTER
8/10/2021       RE: Magaly Renee  [address] [place] [address]     Dear Colleague,    Thank you for referring your patient, Magaly Renee, to the [facility] NEUROLOG[facility]IC [place] at [facility]. Please see a copy of my visit note below.    EPILEPSY CLINIC VIDEO VISIT NOTE  The scheduled clinic visit was changed to a video visit to reduce the risk of COVID-19 transmission.           Service Date: 02/02/2021    HISTORY: I spoke with Ms. Magaly Renee and her .  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      The patient denied having recent fever or cough, and exposure to anyone thought to have COVID-19.     Following the most recent visit to this clinic on 02/02/2021, the patient reportedly had a nocturnal, sleep-onset, grand mal seizure on 02/24/2021, which was witnessed by her .  She then increased levetiracetam to a higher dose that she had used previously (3000 mg b.i.d.).    The last prior grand mal seizure occurred in 2016.    The most recent absence seizure occurred approximately in 2013.      She denied adverse effects of the AEDs.      She has not had any recent migraine headaches.  She occasionally has mild aching headaches that rapidly resolve with one dose of an OTC analgesic.      MEDICATIONS:  Levetiracetam 3000 mg b.i.d., lamotrigine 100/200 mg daily, and other medications as per the electronic medical record.      VIDEO OBSERVATIONS:   The patient spoke with normal articulation, fluency and syntax, with normal comprehension of questions.    On command, the patient moved the direction of gaze into all 4 quadrants conjugately and without nystagmus.   Facial movements were normal.    Tongue protruded on the midline.    The patient did not display any resting tremors or action tremors of the  outstretched arms.  Limb movements were normal.      IMPRESSION:   The patient had a grand mal seizure control last February.  She then increased her levetiracetam dose to a higher dose that she had used previously, but she did not call this clinic to report the seizure.  She did stop driving for 3 months after the seizures.    Currently she is not having adverse AED effects.      We will check AED levels today.      PLAN:   1)  Continue levetiracetam and lamotrigine at the current doses.   2)  Obtain levetiracetam and lamotrigine levels.   3)  Return in 6 months for video visit.    Video Conference via [employer].   Video Start Time: 10:07 a.m.   Video Stop Time: 10:24 a.m.   Provider location: [facility]   Reported Patient Location: Home     I personally spent 25 minutes in this patient care on the day of this visit, including time in direct contact with the patient of which more than 50% consisted of counseling and coordinating care, and time in other necessary patient care activities without direct patient contact including medical record and imaging review.    Yassine Eduardo M.D., Professor of Neurology          Again, thank you for allowing me to participate in the care of your patient.      Sincerely,    Yassine Eduardo MD

## 2021-08-10 NOTE — PROGRESS NOTES
Karen is a 60 year old who is being evaluated via a billable video visit.       How would you like to obtain your AVS? MyChart  If the video visit is dropped, the invitation should be resent by: Send to e-mail at: [email or web address]  Will anyone else be joining your video visit? No      EPILEPSY CLINIC VIDEO VISIT NOTE  The scheduled clinic visit was changed to a video visit to reduce the risk of COVID-19 transmission.           Service Date: 02/02/2021    HISTORY: I spoke with Ms. Magaly Renee and her .  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      The patient denied having recent fever or cough, and exposure to anyone thought to have COVID-19.     Following the most recent visit to this clinic on 02/02/2021, the patient reportedly had a nocturnal, sleep-onset, grand mal seizure on 02/24/2021, which was witnessed by her .  She then increased levetiracetam to a higher dose that she had used previously (3000 mg b.i.d.).    The last prior grand mal seizure occurred in 2016.    The most recent absence seizure occurred approximately in 2013.      She denied adverse effects of the AEDs.      She has not had any recent migraine headaches.  She occasionally has mild aching headaches that rapidly resolve with one dose of an OTC analgesic.      MEDICATIONS:  Levetiracetam 3000 mg b.i.d., lamotrigine 100/200 mg daily, and other medications as per the electronic medical record.      VIDEO OBSERVATIONS:   The patient spoke with normal articulation, fluency and syntax, with normal comprehension of questions.    On command, the patient moved the direction of gaze into all 4 quadrants conjugately and without nystagmus.   Facial movements were normal.    Tongue protruded on the midline.    The patient did not display any resting tremors or action tremors of the outstretched arms.  Limb  movements were normal.      IMPRESSION:   The patient had a grand mal seizure control last February.  She then increased her levetiracetam dose to a higher dose that she had used previously, but she did not call this clinic to report the seizure.  She did stop driving for 3 months after the seizures.    Currently she is not having adverse AED effects.      We will check AED levels today.      PLAN:   1)  Continue levetiracetam and lamotrigine at the current doses.   2)  Obtain levetiracetam and lamotrigine levels.   3)  Return in 6 months for video visit.    Video Conference via lifeaction games.   Video Start Time: 10:07 a.m.   Video Stop Time: 10:24 a.m.   Provider location: [facility]   Reported Patient Location: Home     I personally spent 25 minutes in this patient care on the day of this visit, including time in direct contact with the patient of which more than 50% consisted of counseling and coordinating care, and time in other necessary patient care activities without direct patient contact including medical record and imaging review.    Yassine Eduardo M.D., Professor of Neurology

## 2021-08-11 ENCOUNTER — LAB (OUTPATIENT)
Dept: LAB | Facility: CLINIC | Age: 61
End: 2021-08-11
Payer: COMMERCIAL

## 2021-08-11 DIAGNOSIS — G40.409 OTHER GENERALIZED EPILEPSY, NOT INTRACTABLE, WITHOUT STATUS EPILEPTICUS (H): ICD-10-CM

## 2021-08-11 PROCEDURE — 80175 DRUG SCREEN QUAN LAMOTRIGINE: CPT | Mod: 90

## 2021-08-11 PROCEDURE — 99000 SPECIMEN HANDLING OFFICE-LAB: CPT

## 2021-08-11 PROCEDURE — 80177 DRUG SCRN QUAN LEVETIRACETAM: CPT

## 2021-08-11 PROCEDURE — 36415 COLL VENOUS BLD VENIPUNCTURE: CPT

## 2021-08-12 LAB
LAMOTRIGINE SERPL-MCNC: 5.4 UG/ML
LEVETIRACETAM SERPL-MCNC: 113 UG/ML

## 2021-08-27 ENCOUNTER — TELEPHONE (OUTPATIENT)
Dept: FAMILY MEDICINE | Facility: CLINIC | Age: 61
End: 2021-08-27

## 2021-08-27 NOTE — TELEPHONE ENCOUNTER
Prior Authorization Retail Medication Request    Medication/Dose:   ICD code (if different than what is on RX):    Previously Tried and Failed:    Rationale:      Insurance Name:  [employer]  Insurance ID:  [id number]    PLAN LIMITATIONS EXCEEDED TOTAL MME 45MG     Pharmacy Information (if different than what is on RX)  Name:  [facility]   Phone:  [phone]

## 2021-08-27 NOTE — TELEPHONE ENCOUNTER
Prior Authorization Approval    Authorization Effective Date: 8/27/2021  Authorization Expiration Date: 2/27/2022  Medication: oxyCODONE-Acetaminophen 5-325MG tablets  Approved Dose/Quantity:   Reference #:     Insurance Company: [employer] ([facility]) - Phone [phone] Fax [phone]  Expected CoPay:       CoPay Card Available:      Foundation Assistance Needed:    Which Pharmacy is filling the prescription (Not needed for infusion/clinic administered): [facility] [place], [place] - [address]  Pharmacy Notified: Yes  Patient Notified: Yes

## 2021-08-30 ENCOUNTER — THERAPY VISIT (OUTPATIENT)
Dept: PHYSICAL THERAPY | Facility: CLINIC | Age: 61
End: 2021-08-30
Payer: COMMERCIAL

## 2021-08-30 DIAGNOSIS — M54.41 RIGHT-SIDED LOW BACK PAIN WITH RIGHT-SIDED SCIATICA, UNSPECIFIED CHRONICITY: Primary | ICD-10-CM

## 2021-08-30 PROCEDURE — 97110 THERAPEUTIC EXERCISES: CPT | Mod: GP | Performed by: PHYSICAL THERAPIST

## 2021-08-30 PROCEDURE — 97140 MANUAL THERAPY 1/> REGIONS: CPT | Mod: GP | Performed by: PHYSICAL THERAPIST

## 2021-09-07 ENCOUNTER — THERAPY VISIT (OUTPATIENT)
Dept: PHYSICAL THERAPY | Facility: CLINIC | Age: 61
End: 2021-09-07
Payer: COMMERCIAL

## 2021-09-07 DIAGNOSIS — M54.9 BACK PAIN: ICD-10-CM

## 2021-09-07 PROCEDURE — 97112 NEUROMUSCULAR REEDUCATION: CPT | Mod: GP | Performed by: PHYSICAL THERAPIST

## 2021-09-07 PROCEDURE — 97110 THERAPEUTIC EXERCISES: CPT | Mod: GP | Performed by: PHYSICAL THERAPIST

## 2021-09-15 ENCOUNTER — THERAPY VISIT (OUTPATIENT)
Dept: PHYSICAL THERAPY | Facility: CLINIC | Age: 61
End: 2021-09-15
Payer: COMMERCIAL

## 2021-09-15 DIAGNOSIS — M54.9 BACK PAIN: Primary | ICD-10-CM

## 2021-09-15 PROCEDURE — 97112 NEUROMUSCULAR REEDUCATION: CPT | Mod: GP | Performed by: PHYSICAL THERAPIST

## 2021-09-15 PROCEDURE — 97110 THERAPEUTIC EXERCISES: CPT | Mod: GP | Performed by: PHYSICAL THERAPIST

## 2021-09-19 ENCOUNTER — HEALTH MAINTENANCE LETTER (OUTPATIENT)
Age: 61
End: 2021-09-19

## 2021-09-22 ENCOUNTER — THERAPY VISIT (OUTPATIENT)
Dept: PHYSICAL THERAPY | Facility: CLINIC | Age: 61
End: 2021-09-22
Payer: COMMERCIAL

## 2021-09-22 DIAGNOSIS — M54.9 BACK PAIN: ICD-10-CM

## 2021-09-22 PROCEDURE — 97110 THERAPEUTIC EXERCISES: CPT | Mod: GP | Performed by: PHYSICAL THERAPIST

## 2021-09-22 PROCEDURE — 97112 NEUROMUSCULAR REEDUCATION: CPT | Mod: GP | Performed by: PHYSICAL THERAPIST

## 2021-09-29 ENCOUNTER — THERAPY VISIT (OUTPATIENT)
Dept: PHYSICAL THERAPY | Facility: CLINIC | Age: 61
End: 2021-09-29
Payer: COMMERCIAL

## 2021-09-29 DIAGNOSIS — M54.9 BACK PAIN: ICD-10-CM

## 2021-09-29 PROCEDURE — 97112 NEUROMUSCULAR REEDUCATION: CPT | Mod: GP | Performed by: PHYSICAL THERAPIST

## 2021-09-29 PROCEDURE — 97110 THERAPEUTIC EXERCISES: CPT | Mod: GP | Performed by: PHYSICAL THERAPIST

## 2021-10-13 ENCOUNTER — THERAPY VISIT (OUTPATIENT)
Dept: PHYSICAL THERAPY | Facility: CLINIC | Age: 61
End: 2021-10-13
Payer: COMMERCIAL

## 2021-10-13 DIAGNOSIS — M54.9 BACK PAIN: ICD-10-CM

## 2021-10-13 PROCEDURE — 97110 THERAPEUTIC EXERCISES: CPT | Mod: GP | Performed by: PHYSICAL THERAPIST

## 2021-11-29 NOTE — TELEPHONE ENCOUNTER
LOV:  3/6/17  LAST REFILL:  4/12/17     Detail Level: Zone Additional Notes: The patient takes no meds Quality 130: Documentation Of Current Medications In The Medical Record: Documentation of a medical reason(s) for not documenting, updating, or reviewing the patientâs current medications list (e.g., patient is in an urgent or emergent medical situation)

## 2022-01-09 ENCOUNTER — HEALTH MAINTENANCE LETTER (OUTPATIENT)
Age: 62
End: 2022-01-09

## 2022-01-11 ENCOUNTER — VIRTUAL VISIT (OUTPATIENT)
Dept: NEUROLOGY | Facility: CLINIC | Age: 62
End: 2022-01-11
Payer: COMMERCIAL

## 2022-01-11 DIAGNOSIS — G40.319 INTRACTABLE GENERALIZED EPILEPSY (H): ICD-10-CM

## 2022-01-11 DIAGNOSIS — G40.409 OTHER GENERALIZED EPILEPSY, NOT INTRACTABLE, WITHOUT STATUS EPILEPTICUS (H): ICD-10-CM

## 2022-01-11 PROCEDURE — 99214 OFFICE O/P EST MOD 30 MIN: CPT | Mod: 95 | Performed by: PSYCHIATRY & NEUROLOGY

## 2022-01-11 RX ORDER — LEVETIRACETAM 1000 MG/1
2000 TABLET ORAL 2 TIMES DAILY
Qty: 360 TABLET | Refills: 3 | Status: SHIPPED | OUTPATIENT
Start: 2022-01-11 | End: 2022-01-11

## 2022-01-11 RX ORDER — LAMOTRIGINE 100 MG/1
200 TABLET ORAL 2 TIMES DAILY
Qty: 360 TABLET | Refills: 3 | Status: SHIPPED | OUTPATIENT
Start: 2022-01-11 | End: 2022-11-22

## 2022-01-11 RX ORDER — LAMOTRIGINE 100 MG/1
200 TABLET ORAL 2 TIMES DAILY
Qty: 360 TABLET | Refills: 3 | Status: SHIPPED | OUTPATIENT
Start: 2022-01-11 | End: 2022-01-11

## 2022-01-11 RX ORDER — IBUPROFEN 200 MG
200 TABLET ORAL EVERY 4 HOURS PRN
COMMUNITY
End: 2022-10-19

## 2022-01-11 RX ORDER — LEVETIRACETAM 1000 MG/1
2000 TABLET ORAL 2 TIMES DAILY
Qty: 360 TABLET | Refills: 3 | Status: SHIPPED | OUTPATIENT
Start: 2022-01-11 | End: 2022-03-31

## 2022-01-11 NOTE — LETTER
2022       RE: Magaly Renee  [address]  [place] [address]     Dear Colleague,    Thank you for referring your patient, Magaly Renee, to the [facility] NEUROLOGY [facility] [place] at [facility]. Please see a copy of my visit note below.      EPILEPSY CLINIC VIDEO VISIT NOTE  The scheduled clinic visit was changed to a video visit to reduce the risk of COVID-19 transmission.           Service Date: 2022    HISTORY: I spoke with Ms. Magaly Renee and her .  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      The patient denied having recent fever or cough, and exposure to anyone thought to have COVID-19.     Following the most recent visit to this clinic on 08/10/2021, the patient reportedly has had no seizures.  The most recent seizure was a nocturnal, sleep-onset, grand mal seizure on 2021, which was witnessed by her .  She then increased levetiracetam to a higher dose that she had used previously (3000 mg b.i.d.).    The last prior grand mal seizure occurred in .    The most recent absence seizure occurred approximately in .      She denied adverse effects of the AEDs, although she is tired at times during the day.      She has not had any recent migraine headaches.  She occasionally has mild aching headaches that rapidly resolve with one dose of an OTC analgesic.      PAST MEDICAL-SURGICAL HISTORY:    1.  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     2.  Chronic migraine headaches.   3.  Status post  section.     MEDICATIONS:  Levetiracetam 3000 mg b.i.d., lamotrigine 100/200 mg daily, and other medications as per the electronic medical record.      VIDEO OBSERVATIONS:   The  patient spoke with normal articulation, fluency and syntax, with normal comprehension of questions.    On command, the patient moved the direction of gaze into all 4 quadrants conjugately and without nystagmus.   Facial movements were normal.    Tongue protruded on the midline.    The patient did not display any resting tremors or action tremors of the outstretched arms.  Limb movements were normal.      IMPRESSION:   The patient has not had seizures on the current AED regimen.  She had a grand mal seizure in February 2021.  She then increased her levetiracetam dose to a higher dose that she had used previously, but she did not call this clinic to report the seizure.  Currently she is not having any definite adverse AED effects, but a times is drowsy during the day.  She did stop driving for 3 months after the seizure.    She had a levetiracetam level of 113 mcg/ml, and a lamotrigine level of 5.4 mcg/ml, on 08/11/2021, when she probably was in steady state on the current doses.  I told her that a more effective long-term regimen would probably provide a lower levetiracetam level and a higher lamotrigine level than this.  She agreed with re-adjusting the doses.      She appeared to clearly understand that she is prohibited from operating a motor vehicle within 3 months following any seizure or other episode with sudden unconsciousness or inability to sit up, and that she is required to report any future such seizure to the [facility] within 30 days after the event.      PLAN:   1)  Decrease levetiracetam to 2000/3000 mg daily today, and to 2000 mg b.i.d. on 01/25/2022.   2)  Increase lamotrigine to 150/200 mg daily today and to 200 mg b.i.d. on 01/25/2022.   3)  Obtain levetiracetam and lamotrigine levels on 02/08/2022.   4)  Return in 3 months for video visit.    Video Conference via [employer].   Video Start Time: 10:23 a.m.   Video Stop Time: 10:54 a.m.   Provider location: [facility]   Reported Patient  Location: Home     I personally spent 37 minutes in this patient care on the day of this visit, including time in direct contact with the patient of which more than 50% consisted of counseling and coordinating care, and time in other necessary patient care activities without direct patient contact including medical record and imaging review.    Yassine Eduardo M.D., Professor of Neurology

## 2022-01-11 NOTE — PROGRESS NOTES
Karen is a 60 year old who is being evaluated via a billable video visit.       How would you like to obtain your AVS? MyChart  If the video visit is dropped, the invitation should be resent by: Send to e-mail at: [email or web address]  Will anyone else be joining your video visit?       EPILEPSY CLINIC VIDEO VISIT NOTE  The scheduled clinic visit was changed to a video visit to reduce the risk of COVID-19 transmission.           Service Date: 2022    HISTORY: I spoke with Ms. Magaly Renee and her .  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      The patient denied having recent fever or cough, and exposure to anyone thought to have COVID-19.     Following the most recent visit to this clinic on 08/10/2021, the patient reportedly has had no seizures.  The most recent seizure was a nocturnal, sleep-onset, grand mal seizure on 2021, which was witnessed by her .  She then increased levetiracetam to a higher dose that she had used previously (3000 mg b.i.d.).    The last prior grand mal seizure occurred in .    The most recent absence seizure occurred approximately in .      She denied adverse effects of the AEDs, although she is tired at times during the day.      She has not had any recent migraine headaches.  She occasionally has mild aching headaches that rapidly resolve with one dose of an OTC analgesic.      PAST MEDICAL-SURGICAL HISTORY:    1.  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     2.  Chronic migraine headaches.   3.  Status post  section.     MEDICATIONS:  Levetiracetam 3000 mg b.i.d., lamotrigine 100/200 mg daily, and other medications as per the electronic medical record.      VIDEO OBSERVATIONS:   The patient spoke with  normal articulation, fluency and syntax, with normal comprehension of questions.    On command, the patient moved the direction of gaze into all 4 quadrants conjugately and without nystagmus.   Facial movements were normal.    Tongue protruded on the midline.    The patient did not display any resting tremors or action tremors of the outstretched arms.  Limb movements were normal.      IMPRESSION:   The patient has not had seizures on the current AED regimen.  She had a grand mal seizure in February 2021.  She then increased her levetiracetam dose to a higher dose that she had used previously, but she did not call this clinic to report the seizure.  Currently she is not having any definite adverse AED effects, but a times is drowsy during the day.  She did stop driving for 3 months after the seizure.    She had a levetiracetam level of 113 mcg/ml, and a lamotrigine level of 5.4 mcg/ml, on 08/11/2021, when she probably was in steady state on the current doses.  I told her that a more effective long-term regimen would probably provide a lower levetiracetam level and a higher lamotrigine level than this.  She agreed with re-adjusting the doses.      She appeared to clearly understand that she is prohibited from operating a motor vehicle within 3 months following any seizure or other episode with sudden unconsciousness or inability to sit up, and that she is required to report any future such seizure to the [facility] within 30 days after the event.      PLAN:   1)  Decrease levetiracetam to 2000/3000 mg daily today, and to 2000 mg b.i.d. on 01/25/2022.   2)  Increase lamotrigine to 150/200 mg daily today and to 200 mg b.i.d. on 01/25/2022.   3)  Obtain levetiracetam and lamotrigine levels on 02/08/2022.   4)  Return in 3 months for video visit.    Video Conference via [employer].   Video Start Time: 10:23 a.m.   Video Stop Time: 10:54 a.m.   Provider location: [facility]   Reported Patient Location: Home     I  personally spent 37 minutes in this patient care on the day of this visit, including time in direct contact with the patient of which more than 50% consisted of counseling and coordinating care, and time in other necessary patient care activities without direct patient contact including medical record and imaging review.    Yassine Eduardo M.D., Professor of Neurology

## 2022-01-11 NOTE — PATIENT INSTRUCTIONS
- Decrease levetiracetam to 2 tablets (2000 mg) in the AM and 3000 mg in the PM as of today, and to 2000 mg twice daily on 01/25/2022.   - Increase lamotrigine to 1 and 1/2 tablets (150 mg) in the AM and 2 tablets (200 mg) in the PM as of today, and to 200 mg twice daily on 01/25/2022.   - Obtain levetiracetam and lamotrigine levels on 02/08/2022.

## 2022-02-04 ENCOUNTER — TELEPHONE (OUTPATIENT)
Dept: NEUROLOGY | Facility: CLINIC | Age: 62
End: 2022-02-04
Payer: COMMERCIAL

## 2022-02-04 NOTE — TELEPHONE ENCOUNTER
COLTENM to schedule return appt w Dr. Eduardo, virtual in April, per tressa, provided clinic number for call back.

## 2022-02-23 ENCOUNTER — LAB (OUTPATIENT)
Dept: LAB | Facility: CLINIC | Age: 62
End: 2022-02-23
Payer: COMMERCIAL

## 2022-02-23 DIAGNOSIS — G40.319 INTRACTABLE GENERALIZED EPILEPSY (H): ICD-10-CM

## 2022-02-23 PROCEDURE — 80175 DRUG SCREEN QUAN LAMOTRIGINE: CPT | Mod: 90

## 2022-02-23 PROCEDURE — 36415 COLL VENOUS BLD VENIPUNCTURE: CPT

## 2022-02-23 PROCEDURE — 99000 SPECIMEN HANDLING OFFICE-LAB: CPT

## 2022-02-23 PROCEDURE — 80177 DRUG SCRN QUAN LEVETIRACETAM: CPT | Mod: 90

## 2022-03-02 LAB
LAMOTRIGINE SERPL-MCNC: 6 UG/ML
LEVETIRACETAM SERPL-MCNC: 45 UG/ML

## 2022-03-06 ENCOUNTER — HEALTH MAINTENANCE LETTER (OUTPATIENT)
Age: 62
End: 2022-03-06

## 2022-03-15 NOTE — LETTER
July 23, 2019      Magaly Renee  [address]  [place] [address]          Dear Karen,    In order to ensure we are providing the best quality care, Madelyn and I have reviewed your chart and see that you are due for a yearly Physical including a Pap and a fasting cholesterol lab test.     Please call the clinic to set up an office visit at [phone] or [phone]. Please come fasting to the appointment, usually early morning appointment make this easier.  Fasting = Nothing to eat or drink for 10-12 hours prior to test, but can have plenty of water.    Early detection of Breast Cancer is very important.  It is the key to successful treatment. Although mammography is the most accurate method for early detection, not all cancers are found through mammography.  A thorough examination includes a combination of mammography, physical examination and monthly breast self-examination. Women should begin having mammograms yearly at age 40, or earlier if they're at high risk.  1 in 8 women will develop invasive breast cancer during her lifetime and it is the most common non-skin cancer in American women. EARLY detection, new treatments, and a better understanding of the disease have increased survival rates - the 5 year survival rate in the 1960s was 63% and today it is close to 90%.     Please call [phone] to schedule a Mammogram at one of the following [place] Facilities: [facility], [facility], [facility].    Colon cancer screening is recommended starting at the age of 50. We have noticed that you have not yet had your colonoscopy. We recognize that this procedure can be time consuming and sometimes uncomfortable. However, colonoscopy is the gold standard for colon cancer screening and may be performed as infrequently as every 10 years as long as the colon appears healthy.  Colonoscopy enables the physician to detect and remove precancerous polyps and  identify early cancers during a single examination. Since colorectal cancer is the second leading cause of cancer related deaths in the [place] we strongly recommend screening for this disease.    Please call and set up an appointment at one of the [place] Colonoscopy facilities:  [facility] [phone]  [facility] [phone]  [facility] [phone]    We greatly appreciate the opportunity to serve you. Thank you for trusting us with your health care.    Sincerely,    JORGE Rabago, CMA     Clindamycin Pregnancy And Lactation Text: This medication can be used in pregnancy if certain situations. Clindamycin is also present in breast milk.

## 2022-03-28 DIAGNOSIS — G40.409 OTHER GENERALIZED EPILEPSY, NOT INTRACTABLE, WITHOUT STATUS EPILEPTICUS (H): ICD-10-CM

## 2022-03-31 RX ORDER — LEVETIRACETAM 1000 MG/1
2000 TABLET ORAL 2 TIMES DAILY
Qty: 360 TABLET | Refills: 0 | Status: SHIPPED | OUTPATIENT
Start: 2022-03-31 | End: 2022-05-18

## 2022-03-31 NOTE — TELEPHONE ENCOUNTER
LEVETIRACETAM 1,000 MG TABLET  Last Written Prescription Date:  1/11/2022  Last Fill Quantity: 360,   # refills: 3  Last Office Visit :  1/11/2022  Future Office visit:   6/13/2022  Please clarify order.   Is it 2 tabs twice a day?  Or 2 Tabs in AM  AND 3 Tabs in PM?  Please look at 1/11/2022 note.   Please send new order to updated/alternative pharmacy for Pt care.    Thank you     Puja Curtis RN  Central Triage Red Flags/Med Refills    PLAN: 1/11/2022  1)  Decrease levetiracetam to 2000/3000 mg daily today, and to 2000 mg b.i.d. on 01/25/2022.   2)  Increase lamotrigine to 150/200 mg daily today and to 200 mg b.i.d. on 01/25/2022.   3)  Obtain levetiracetam and lamotrigine levels on 02/08/2022.   4)  Return in 3 months for video visit.

## 2022-05-02 ENCOUNTER — HOSPITAL ENCOUNTER (EMERGENCY)
Facility: CLINIC | Age: 62
Discharge: HOME | End: 2022-05-02
Attending: FAMILY MEDICINE | Admitting: FAMILY MEDICINE
Payer: COMMERCIAL

## 2022-05-02 VITALS
OXYGEN SATURATION: 100 % | DIASTOLIC BLOOD PRESSURE: 67 MMHG | RESPIRATION RATE: 16 BRPM | HEIGHT: 67 IN | HEART RATE: 62 BPM | SYSTOLIC BLOOD PRESSURE: 100 MMHG | WEIGHT: 157 LBS | BODY MASS INDEX: 24.64 KG/M2 | TEMPERATURE: 98.4 F

## 2022-05-02 DIAGNOSIS — M54.16 LUMBAR RADICULOPATHY: ICD-10-CM

## 2022-05-02 PROCEDURE — 250N000011 HC RX IP 250 OP 636: Performed by: FAMILY MEDICINE

## 2022-05-02 PROCEDURE — 96372 THER/PROPH/DIAG INJ SC/IM: CPT | Performed by: FAMILY MEDICINE

## 2022-05-02 PROCEDURE — 99284 EMERGENCY DEPT VISIT MOD MDM: CPT | Performed by: FAMILY MEDICINE

## 2022-05-02 PROCEDURE — 99285 EMERGENCY DEPT VISIT HI MDM: CPT | Performed by: FAMILY MEDICINE

## 2022-05-02 RX ORDER — OXYCODONE HYDROCHLORIDE 5 MG/1
5-10 TABLET ORAL EVERY 6 HOURS PRN
Qty: 12 TABLET | Refills: 0 | Status: SHIPPED | OUTPATIENT
Start: 2022-05-02 | End: 2022-05-05

## 2022-05-02 RX ADMIN — HYDROMORPHONE HYDROCHLORIDE 1 MG: 1 INJECTION, SOLUTION INTRAMUSCULAR; INTRAVENOUS; SUBCUTANEOUS at 07:28

## 2022-05-02 NOTE — ED PROVIDER NOTES
History     Chief Complaint   Patient presents with     Back Pain     HPI    Magaly Renee is a 61 year old female who presents with low back pain.  This began last night.  She does not recall any specific injury but may have been doing some twisting that could have brought it on.  She had an episode of back pain last year for which she was in physical therapy which resolved.  She had no recent other trauma.  Pain is in the right lumbar paraspinal region and radiates to the right buttock.  It does not radiate past the knee.  She feels weak in the right leg.  No loss of bowel or bladder control.  She has a history of a seizure disorder and takes levetiracetam.  She has not had any recent seizures.    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       Persistent Leukopenia 04/09/2008     Priority: Medium     ?Keppra vs other.  Check peripheral smear and consider hematology       Other generalized epilepsy, not intractable, without status epilepticus (H) 07/20/2006     Priority: Medium     Last Exam: July 20, 2006  Medications: Keppra, Carbatrol  Comments: Last seizure 2003  Typically sees neurologist at [facility]  Problem list name updated by automated process. Provider to review          Past Medical History:    Past Medical History:   Diagnosis Date     Localization-related epilepsy (H)      Migraine      Migraine, unspecified, without mention of intractable migraine without mention of status migrainosus 2006     Other convulsions age of 12       Past Surgical History:   "  Past Surgical History:   Procedure Laterality Date     C/SECTION, LOW TRANSVERSE      , Low Transverse       Family History:    Family History   Problem Relation Age of Onset     Neurologic Disorder Mother         migraine     Diabetes No family hx of      C.A.D. No family hx of      Hypertension No family hx of      Cancer - colorectal No family hx of      Prostate Cancer No family hx of      Breast Cancer No family hx of      Cerebrovascular Disease No family hx of        Social History:  Marital Status:   [2]  Social History     Tobacco Use     Smoking status: Never Smoker     Smokeless tobacco: Never Used   Substance Use Topics     Alcohol use: Yes     Comment: occassion     Drug use: No        Medications:    oxyCODONE (ROXICODONE) 5 MG tablet  aspirin 325 MG tablet  cyclobenzaprine (FLEXERIL) 10 MG tablet  ibuprofen (ADVIL/MOTRIN) 200 MG tablet  lamoTRIgine (LAMICTAL) 100 MG tablet  levETIRAcetam (KEPPRA) 1000 MG tablet      Review of Systems  All other systems are reviewed and are negative    Physical Exam   BP: 96/53  Pulse: 72  Temp: 98.4  F (36.9  C)  Resp: 18  Height: 170.2 cm (5' 7\")  Weight: 71.2 kg (157 lb)  SpO2: 100 %      Physical Exam    Nursing note and vitals were reviewed.  Constitutional: Awake and alert, adequately nourished and developed appearing 61-year-old in no apparent discomfort, who does not appear acutely ill, and who answers questions appropriately and cooperates with examination.  HEENT: EOMI.   Neck: Freely mobile.  Cardiovascular: Cardiac examination reveals normal heart rate and regular rhythm without murmur.  Pulmonary/Chest: Breathing is unlabored.  Breath sounds are clear and equal bilaterally.  There no retractions, tachypnea, rales, wheezes, or rhonchi.  Abdomen: Soft, nontender, no HSM or masses rebound or guarding.  Musculoskeletal: Extremities are warm and well-perfused and without edema  Neurological: Alert, oriented, thought content logical, " coherent   Skin: Warm, dry, no rashes.  Psychiatric: Affect broad and appropriate.      ED Course                 Procedures              Critical Care time:  none               No results found for this or any previous visit (from the past 24 hour(s)).    Medications   HYDROmorphone (DILAUDID) injection 1 mg (1 mg Intramuscular Given 5/2/22 0728)       Assessments & Plan (with Medical Decision Making)     61-year-old female with a history of lumbar radiculopathy last year presented with recurrent radicular pain as described above.  There is been no history of trauma.  Therefore no indication for x-rays.  At this time no indication for advanced imaging given onset of pain yesterday and lack of worrisome features with no concern for discitis or more worrisome cause for the pain.  She responded well to a single dose of Dilaudid in the emergency department and will be discharged to physical therapy and supportive care and follow-up with primary care if not mostly better in 1 to 2 weeks.  In the long run I recommended that she endeavor to do back strengthening exercises to protect her from future exacerbations.  We discussed that there is no indication for MRI imaging at this time but if she fails to improve that may become necessary.  She and her  expressed understanding and their questions were answered.    I have reviewed the nursing notes.    I have reviewed the findings, diagnosis, plan and need for follow up with the patient.       New Prescriptions    OXYCODONE (ROXICODONE) 5 MG TABLET    Take 1-2 tablets (5-10 mg) by mouth every 6 hours as needed for pain       Final diagnoses:   Lumbar radiculopathy       5/2/2022   [facility] EMERGENCY DEPT     Roderick Adan MD  05/02/22 0905

## 2022-05-02 NOTE — ED TRIAGE NOTES
Right sided low back pain that radiates to the right buttocks. This feels like her past sciatica. HX of a flare last summer that included RX with PT that helped. This episode began about 20:30 last evening. Rolled in bed and pain was great. Didn't feel well and took Tylenol with some benefit. Now difficulty in standing. No inner thigh pain of numbness. No urinary or bowel incontinence stated.

## 2022-05-02 NOTE — DISCHARGE INSTRUCTIONS
Avoid aggravating activity, heavy lifting, pushing, pulling.  Do gentle stretches for your back.    Schedule physical therapy.    Take ibuprofen, 400 mg, 4 times per day if needed for pain.  You may add acetaminophen 1000 mg 4 times per day if needed for pain.    You may add oxycodone 5 mg, 1-2 tablets up to every 6 hours if needed for pain.  Try to use this primarily only at night to help with sleep.    Do not use alcohol, operate machinery, drive, or climb on ladders, or perform other complex motor activity or make important decisions for 8 hours after taking oxycodone. Use docusate (100mg) 2 times a day to prevent constipation while on narcotics.  Follow-up in primary care if not mostly better in 1 to 2 weeks.  Return to the emergency department if unable to manage the pain or new concerning symptoms.

## 2022-05-10 ENCOUNTER — THERAPY VISIT (OUTPATIENT)
Dept: PHYSICAL THERAPY | Facility: CLINIC | Age: 62
End: 2022-05-10
Payer: COMMERCIAL

## 2022-05-10 DIAGNOSIS — M54.9 BACK PAIN: Primary | ICD-10-CM

## 2022-05-10 DIAGNOSIS — M54.41 RIGHT-SIDED LOW BACK PAIN WITH RIGHT-SIDED SCIATICA, UNSPECIFIED CHRONICITY: ICD-10-CM

## 2022-05-10 PROCEDURE — 97161 PT EVAL LOW COMPLEX 20 MIN: CPT | Mod: GP | Performed by: PHYSICAL THERAPIST

## 2022-05-10 PROCEDURE — 97140 MANUAL THERAPY 1/> REGIONS: CPT | Mod: GP | Performed by: PHYSICAL THERAPIST

## 2022-05-10 PROCEDURE — 97110 THERAPEUTIC EXERCISES: CPT | Mod: GP | Performed by: PHYSICAL THERAPIST

## 2022-05-10 NOTE — PROGRESS NOTES
Physical Therapy Initial Evaluation  Subjective:  The history is provided by the patient and medical records. No  was used.   Patient Health History  Magaly Renee being seen for Pain in Right hip, lower back.     Problem began: 5/1/2022.   Problem occurred: no reason   Pain is reported as 5/10 on pain scale.  General health as reported by patient is good.  Pertinent medical history includes: seizures (epilepsy controlled with medication).   Red flags:  None as reported by patient.  Medical allergies: none.   Surgeries include:  None.    Current medications:  Anti-seizure medication and pain medication.    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   Primary job tasks include:  Lifting/carrying and driving.                  Therapist Generated HPI Evaluation  Problem details: Pt notes that last Sunday with twisting had onset of pain. Did go to ER and improved w/ pain medications. Continues to be sore especially in R hip. Did previously have similar pain with good improvement with PT. Has started back on some stretches from prior episodes of PT. .         Type of problem:  Lumbar.    This is a recurrent condition.  Condition occurred with:  Insidious onset.  Where condition occurred: at home.  Patient reports pain:  Lumbar spine right.  Pain is described as aching and is constant.  Pain radiates to:  Gluteals right. Pain is the same all the time.  Since onset symptoms are unchanged.  Associated symptoms:  Loss of motion/stiffness. Symptoms are exacerbated by sitting and certain positions  and relieved by activity/movement.    Previous treatment includes physical therapy. There was moderate improvement following previous treatment.  Barriers include:  None as reported by patient.                        Objective:  System         Lumbar/SI Evaluation  ROM:    AROM Lumbar:   Flexion:          Full flexion to floor, noting increased RLE sx  Ext:                    Limited 50%, noting no change sx    Side Bend:         Left:  To knee joint line, tightness R    Right:  Limited 50% w/ pain end range  Rotation:           Left:     Right:   Side Glide:        Left:     Right:           Lumbar Myotomes:  normal                Lumbar Dermtomes:  normal                Neural Tension/Mobility:      Left side:SLR; SLR w/DF or Slump  negative.     Right side:   SLR w/DF; Slump or SLR  negative.   Lumbar Palpation:      Tenderness not present at Left:    Quadratus Lumborum; Erector Spinae or PSIS  Tenderness present at Right: PSIS  Tenderness not present at Right:  Quadratus Lumborum; Erector Spinae; Gluteus Medius or Hamstrings (flexibility wnl)    Lumbar Provocation:      Left negative with:  PROM hip  Right positive with: PROM hip (tightness hip ER)    SI joint/Sacrum:    + standing march R  + R ant innominate rotation                                                           General     ROS    Assessment/Plan:    Patient is a 61 year old female with lumbar complaints.    Patient has the following significant findings with corresponding treatment plan.                Diagnosis 1:  Low Back pain with radiculopathy  Pain -  US, electric stimulation, manual therapy and self management  Decreased ROM/flexibility - manual therapy and therapeutic exercise  Decreased joint mobility - manual therapy and therapeutic exercise  Decreased strength - therapeutic exercise and therapeutic activities  Impaired muscle performance - neuro re-education    Therapy Evaluation Codes:   1) History comprised of:   Personal factors that impact the plan of care:      Time since onset of symptoms.    Comorbidity factors that impact the plan of care are:      Seizures.     Medications impacting care: Pain.  2) Examination of Body Systems comprised of:   Body structures and functions that impact the plan of care:      Hip, Lumbar spine, Pelvis and Sacral illiac joint.   Activity limitations that impact the plan of care are:      Bending, Cooking, Driving,  Sitting, Squatting/kneeling, Stairs, Standing and Walking.  3) Clinical presentation characteristics are:   Stable/Uncomplicated.  4) Decision-Making    Low complexity using standardized patient assessment instrument and/or measureable assessment of functional outcome.  Cumulative Therapy Evaluation is: Low complexity.    Previous and current functional limitations:  (See Goal Flow Sheet for this information)    Short term and Long term goals: (See Goal Flow Sheet for this information)     Communication ability:  Patient appears to be able to clearly communicate and understand verbal and written communication and follow directions correctly.  Treatment Explanation - The following has been discussed with the patient:   RX ordered/plan of care  Anticipated outcomes  Possible risks and side effects  This patient would benefit from PT intervention to resume normal activities.   Rehab potential is good.    Frequency:  1 X week, once daily  Duration:  for 8 weeks  Discharge Plan:  Achieve all LTG.  Independent in home treatment program.    Please refer to the daily flowsheet for treatment today, total treatment time and time spent performing 1:1 timed codes.

## 2022-05-13 ENCOUNTER — TELEPHONE (OUTPATIENT)
Dept: FAMILY MEDICINE | Facility: CLINIC | Age: 62
End: 2022-05-13
Payer: COMMERCIAL

## 2022-05-13 DIAGNOSIS — M54.41 RIGHT-SIDED LOW BACK PAIN WITH RIGHT-SIDED SCIATICA, UNSPECIFIED CHRONICITY: Primary | ICD-10-CM

## 2022-05-13 RX ORDER — OXYCODONE HYDROCHLORIDE 5 MG/1
5 TABLET ORAL EVERY 6 HOURS PRN
Qty: 5 TABLET | Refills: 0 | Status: SHIPPED | OUTPATIENT
Start: 2022-05-13 | End: 2022-05-20

## 2022-05-13 NOTE — TELEPHONE ENCOUNTER
I filled #5 pills oxycodone. She needs to be seen in clinic prior to refills and should schedule appointment to follow up as her pain is obviously still bothering her.  Mesha Arguelles PA-C

## 2022-05-13 NOTE — TELEPHONE ENCOUNTER
Patient called for a refill on her oxycodone she received in the ED on 5/2 for hip pain please.      Najma Caceres, KARLA Boyd

## 2022-05-17 ENCOUNTER — THERAPY VISIT (OUTPATIENT)
Dept: PHYSICAL THERAPY | Facility: CLINIC | Age: 62
End: 2022-05-17
Payer: COMMERCIAL

## 2022-05-17 DIAGNOSIS — G40.409 OTHER GENERALIZED EPILEPSY, NOT INTRACTABLE, WITHOUT STATUS EPILEPTICUS (H): ICD-10-CM

## 2022-05-17 DIAGNOSIS — M54.9 BACK PAIN: Primary | ICD-10-CM

## 2022-05-17 PROCEDURE — 97110 THERAPEUTIC EXERCISES: CPT | Mod: GP | Performed by: PHYSICAL THERAPIST

## 2022-05-17 PROCEDURE — 97140 MANUAL THERAPY 1/> REGIONS: CPT | Mod: GP | Performed by: PHYSICAL THERAPIST

## 2022-05-18 RX ORDER — LEVETIRACETAM 1000 MG/1
TABLET ORAL
Qty: 360 TABLET | Refills: 0 | Status: SHIPPED | OUTPATIENT
Start: 2022-05-18 | End: 2022-08-24

## 2022-05-18 NOTE — TELEPHONE ENCOUNTER
"  LEVETIRACETAM 1,000 MG TABLET Take 2 tablets (2,000 mg) by mouth 2 times   Last Written Prescription Date:   3/31/22  Last Fill Quantity: 360  # refills: 0  Last Office Visit : 1/11/22  Future Office visit:  6/13/22 2/23/22 keppra level elevated > reviewed by Dr Eduardo  \"The anti-seizure medication levels were in the ranges expected for the doses.\"   Keppra (Levetiracetam) Level 10 - 40 ug/mL 45 High        PLAN: 1/11/2022  1)  Decrease levetiracetam to 2000/3000 mg daily today, and to 2000 mg b.i.d. on 01/25/2022.   2)  Increase lamotrigine to 150/200 mg daily today and to 200 mg b.i.d. on 01/25/2022.   3)  Obtain levetiracetam and lamotrigine levels on 02/08/2022.   4)  Return in 3 months for video visit.    "

## 2022-05-20 ENCOUNTER — OFFICE VISIT (OUTPATIENT)
Dept: FAMILY MEDICINE | Facility: CLINIC | Age: 62
End: 2022-05-20

## 2022-05-20 ENCOUNTER — ANCILLARY PROCEDURE (OUTPATIENT)
Dept: GENERAL RADIOLOGY | Facility: CLINIC | Age: 62
End: 2022-05-20
Attending: PHYSICIAN ASSISTANT
Payer: COMMERCIAL

## 2022-05-20 VITALS
DIASTOLIC BLOOD PRESSURE: 62 MMHG | HEART RATE: 68 BPM | TEMPERATURE: 97.9 F | RESPIRATION RATE: 16 BRPM | WEIGHT: 160.2 LBS | BODY MASS INDEX: 25.15 KG/M2 | OXYGEN SATURATION: 95 % | HEIGHT: 67 IN | SYSTOLIC BLOOD PRESSURE: 94 MMHG

## 2022-05-20 DIAGNOSIS — M25.551 HIP PAIN, RIGHT: ICD-10-CM

## 2022-05-20 DIAGNOSIS — M54.41 RIGHT-SIDED LOW BACK PAIN WITH RIGHT-SIDED SCIATICA, UNSPECIFIED CHRONICITY: ICD-10-CM

## 2022-05-20 DIAGNOSIS — Z78.0 ASYMPTOMATIC POSTMENOPAUSAL STATUS: Primary | ICD-10-CM

## 2022-05-20 PROCEDURE — 72170 X-RAY EXAM OF PELVIS: CPT | Mod: TC | Performed by: RADIOLOGY

## 2022-05-20 PROCEDURE — 99213 OFFICE O/P EST LOW 20 MIN: CPT | Performed by: PHYSICIAN ASSISTANT

## 2022-05-20 RX ORDER — OXYCODONE HYDROCHLORIDE 10 MG/1
TABLET ORAL
COMMUNITY
Start: 2022-05-02 | End: 2022-05-20

## 2022-05-20 RX ORDER — OXYCODONE HYDROCHLORIDE 5 MG/1
5 TABLET ORAL EVERY 6 HOURS PRN
Qty: 10 TABLET | Refills: 0 | Status: SHIPPED | OUTPATIENT
Start: 2022-05-20 | End: 2022-06-29

## 2022-05-20 NOTE — PATIENT INSTRUCTIONS
DEXA bone scan for osteoporosis: check insurance coverage prior.   Please contact [facility]  at [phone] to schedule appointment      Continue physical therapy  Consider MRI of back/hip, consider seeing spine specialist especially if not improving or if symptoms worsen

## 2022-05-20 NOTE — PROGRESS NOTES
Assessment & Plan     ASSESSMENT/PLAN:      ICD-10-CM    1. Asymptomatic postmenopausal status  Z78.0 DX Hip/Pelvis/Spine   2. Hip pain, right  M25.551 XR Pelvis 1/2 Views   3. Right-sided low back pain with right-sided sciatica, unspecified chronicity  M54.41 oxyCODONE (ROXICODONE) 5 MG tablet     Patient declines further workup today, declines other health maintenance. She notes physical therapy is helping. Plan for short term use of oxycodone. Preventive exam scheduled.    Patient Instructions   DEXA bone scan for osteoporosis: check insurance coverage prior.   Please contact [facility]  at [phone] to schedule appointment      Continue physical therapy  Consider MRI of back/hip, consider seeing spine specialist especially if not improving or if symptoms worsen    Return in about 26 days (around 6/15/2022) for Physical Exam.    JORGE Whaley [facility] JINNY Jones is a 61 year old who presents for the following health issues     History of Present Illness       Back Pain:  She presents for follow up of back pain. Patient's back pain is a new problem.    Original cause of back pain: turning/bending  First noticed back pain: 1-4 weeks ago  Patient feels back pain: 2 to 4 times per dayLocation of back pain:  Right hip  Description of back pain: dull ache  Back pain spreads: nowhere    Since patient first noticed back pain, pain is: gradually improving  Does back pain interfere with her job:  No  On a scale of 1-10 (10 being the worst), patient describes pain as:  5  What makes back pain worse: bending and certain positions  Acetaminophen: helpful  Activity or exercise: helpful  Muscle relaxants: not tried  NSAIDS: helpful  Opioids: helpful  Physical Therapy: helpful  TENS unit: not tried  Other healthcare providers patient is seeing for back pain: Physical Therapist    She eats 2-3 servings of fruits and vegetables daily.She consumes 0  "sweetened beverage(s) daily.She exercises with enough effort to increase her heart rate 10 to 19 minutes per day.  She exercises with enough effort to increase her heart rate 3 or less days per week.   She is taking medications regularly.     She feels physical therapy has been helping a lot  Pain is worse at night - takes 1/2 oxycodone  - may be overdoing in the day   Feels this is a recurrence of the same chronic back pain  - posterior hip, not down leg, no paresthesias. No incontinence.  - lumbar x-ray last year incidentally showed T12 compression fracture, likely old, patient did not follow up for physical/further workup. Will recommend dexa today to start workup.    Review of Systems   Other than noted above, general, HEENT, respiratory, cardiac, MS, and gastrointestinal systems are negative.       Objective    BP 94/62   Pulse 68   Temp 97.9  F (36.6  C) (Tympanic)   Resp 16   Ht 1.702 m (5' 7\")   Wt 72.7 kg (160 lb 3.2 oz)   LMP 07/06/2016 (LMP Unknown)   SpO2 95%   BMI 25.09 kg/m    Body mass index is 25.09 kg/m .  Physical Exam   GENERAL: healthy, alert and no distress  RESP: lungs clear to auscultation - no rales, rhonchi or wheezes  CV: regular rate and rhythm, normal S1 S2, no S3 or S4, no murmur, click or rub, no peripheral edema and peripheral pulses strong  ABDOMEN: soft, nontender, no hepatosplenomegaly, no masses and bowel sounds normal  MS: no gross musculoskeletal defects noted, no edema  Comprehensive back pain exam:  No tenderness, Pain limits the following motions: back extension, Lower extremity strength functional and equal on both sides and Lower extremity sensation normal and equal on both sides  ORTHO: Hip Exam: Palpation: Tender:   right proximal hamstring attachment  Non-tender:  No other tenderness  Range of Motion:  Full ROM, both hips  Strength:  abduction: somewhat weak bilaterally      Results for orders placed or performed in visit on 05/20/22   XR Pelvis 1/2 Views     " Status: None    Narrative    Examination:  XR PELVIS 1/2 VW    Date:  5/20/2022 9:26 AM     Clinical Information: Right hip and low back pain.    Comparison: none.      Impression    Impression:    1.  Bilateral congenital hip dysplasia with shallow bilateral  acetabula and under coverage of the femoral heads. Both hip joints are  normally aligned.    2.  Mild degenerative arthritic changes in the right hip, including  partial joint space narrowing, and subchondral sclerosis. No fracture  or bone lesion.    3.  Normal left hip joint alignment with maintained joint spacing.    4.  Moderate-severe degenerative disc-endplate changes at L4-5 and  L5-S1, with disc space narrowing and marginal osteophytes.    CHERYL DOMINGO MD         SYSTEM ID:  [id number]

## 2022-05-24 ENCOUNTER — THERAPY VISIT (OUTPATIENT)
Dept: PHYSICAL THERAPY | Facility: CLINIC | Age: 62
End: 2022-05-24
Payer: COMMERCIAL

## 2022-05-24 DIAGNOSIS — M54.9 BACK PAIN: Primary | ICD-10-CM

## 2022-05-24 PROCEDURE — 97110 THERAPEUTIC EXERCISES: CPT | Mod: GP | Performed by: PHYSICAL THERAPIST

## 2022-05-24 PROCEDURE — 97140 MANUAL THERAPY 1/> REGIONS: CPT | Mod: GP | Performed by: PHYSICAL THERAPIST

## 2022-06-07 ENCOUNTER — THERAPY VISIT (OUTPATIENT)
Dept: PHYSICAL THERAPY | Facility: CLINIC | Age: 62
End: 2022-06-07
Payer: COMMERCIAL

## 2022-06-07 DIAGNOSIS — M54.9 BACK PAIN: Primary | ICD-10-CM

## 2022-06-07 PROCEDURE — 97140 MANUAL THERAPY 1/> REGIONS: CPT | Mod: GP | Performed by: PHYSICAL THERAPIST

## 2022-06-07 PROCEDURE — 97110 THERAPEUTIC EXERCISES: CPT | Mod: GP | Performed by: PHYSICAL THERAPIST

## 2022-06-13 ENCOUNTER — TELEPHONE (OUTPATIENT)
Dept: NEUROLOGY | Facility: CLINIC | Age: 62
End: 2022-06-13
Payer: COMMERCIAL

## 2022-06-13 ENCOUNTER — VIRTUAL VISIT (OUTPATIENT)
Dept: NEUROLOGY | Facility: CLINIC | Age: 62
End: 2022-06-13
Payer: COMMERCIAL

## 2022-06-13 DIAGNOSIS — G40.409 OTHER GENERALIZED EPILEPSY, NOT INTRACTABLE, WITHOUT STATUS EPILEPTICUS (H): Primary | ICD-10-CM

## 2022-06-13 PROCEDURE — 99207 PR NO CHARGE LOS: CPT | Performed by: PSYCHIATRY & NEUROLOGY

## 2022-06-13 NOTE — LETTER
Date:June 14, 2022      Provider requested that no letter be sent. Do not send.       [facility]

## 2022-06-13 NOTE — LETTER
6/13/2022       RE: Magaly Renee  [address] [facility] [address]     Dear Colleague,    Thank you for referring your patient, Magaly Renee, to the [facility]. Please see a copy of my visit note below.      Visit re-scheduled.          Again, thank you for allowing me to participate in the care of your patient.      Sincerely,    Yassine Eduardo MD

## 2022-06-14 ENCOUNTER — VIRTUAL VISIT (OUTPATIENT)
Dept: NEUROLOGY | Facility: CLINIC | Age: 62
End: 2022-06-14
Payer: COMMERCIAL

## 2022-06-14 DIAGNOSIS — G40.409 OTHER GENERALIZED EPILEPSY, NOT INTRACTABLE, WITHOUT STATUS EPILEPTICUS (H): Primary | ICD-10-CM

## 2022-06-14 PROCEDURE — 99214 OFFICE O/P EST MOD 30 MIN: CPT | Mod: 95 | Performed by: PSYCHIATRY & NEUROLOGY

## 2022-06-14 NOTE — PROGRESS NOTES
Karen is a 61 year old who is being evaluated via a billable video visit.       How would you like to obtain your AVS? Mychart  If the video visit is dropped, the invitation should be resent by: [email or web address]  [phone]      EPILEPSY CLINIC VIDEO VISIT NOTE  The scheduled clinic visit was changed to a video visit to reduce the risk of COVID-19 transmission.            Service Date: 2022     HISTORY: I spoke with Ms. Magaly Renee and her .  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       The patient denied having recent fever or cough, and exposure to anyone thought to have COVID-19.      Following the most recent video visit to this clinic on 2022, the patient reportedly has had no seizures.      The most recent grand mal seizure occurred on 2021.  The most recent absence seizure occurred approximately in .       She denied adverse effects of the AEDs, although she is tired at times during the day.       She has not had any recent migraine headaches.  She occasionally has mild aching headaches that rapidly resolve with one dose of an OTC analgesic.       PAST MEDICAL-SURGICAL HISTORY:    1.  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     2.  Chronic migraine headaches.   3.  Status post  section.      MEDICATIONS:  Levetiracetam 2000 mg b.i.d., lamotrigine 100/200 mg daily, and other medications as per the electronic medical record.       VIDEO OBSERVATIONS:   The patient spoke with normal articulation, fluency and syntax, with normal comprehension of questions.    On command, the patient moved the direction of gaze into all 4 quadrants conjugately and without nystagmus.   Facial movements were normal.    Tongue protruded on the midline.     The patient did not display any resting tremors or action tremors of the outstretched arms.  Limb movements were normal.       IMPRESSION:   The patient has not had seizures for longer than a year, on the current AED regimen.      She had a grand mal seizure in February 2021.  She then increased her levetiracetam dose to a higher dose that she had used previously, but she did not call this clinic to report the seizure.  Currently she is not having any definite adverse AED effects, but a times is drowsy during the day.  She did stop driving for 3 months after the seizure.     She had a levetiracetam level of 113 mcg/ml, and a lamotrigine level of 5.4 mcg/ml, on 08/11/2021, when she probably was in steady state on the current doses.  I told her that a more effective long-term regimen would probably provide a lower levetiracetam level and a higher lamotrigine level than this.  On the adjusted doses, the levetiracetam level was 45 mcg/ml, and a lamotrigine level of 6.0 mcg/ml.    We discussed the possibility of gradually converting to lamotrigine monotherapy.  The current lamotrigine level may not be adequate for monotherapy [y.  We will make a lamotrigine increased and a levetiracetam decrease today, but not discontinue levetiracetam at this time.       She appeared to clearly understand that she is prohibited from operating a motor vehicle within 3 months following any seizure or other episode with sudden unconsciousness or inability to sit up, and that she is required to report any future such seizure to the [facility] within 30 days after the event.       PLAN:   1)  Decrease levetiracetam to 1000/2000 mg daily.   2)  Increase lamotrigine to 200/300 mg daily.   3)  Return in 4 months for in-person visit.    Video Conference via [employer].   Video Start Time: 10:14 a.m.   Video Stop Time: 10:36 a.m.   Provider location: [facility]   Reported Patient Location: Home      I personally spent 33 minutes in this  patient care on the day of this visit, including time in direct contact with the patient of which more than 50% consisted of counseling and coordinating care, and time in other necessary patient care activities without direct patient contact including medical record and imaging review.    Yassine Eduardo M.D., Professor of Neurology

## 2022-06-14 NOTE — LETTER
2022       RE: Magaly Renee  [address] [place] [address]     Dear Colleague,    Thank you for referring your patient, Magaly Renee, to the [facility]UR[facility] [place] [facility]. Please see a copy of my visit note below.      EPILEPSY CLINIC VIDEO VISIT NOTE  The scheduled clinic visit was changed to a video visit to reduce the risk of COVID-19 transmission.            Service Date: 2022     HISTORY: I spoke with Ms. Magaly Renee and her .  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       The patient denied having recent fever or cough, and exposure to anyone thought to have COVID-19.      Following the most recent video visit to this clinic on 2022, the patient reportedly has had no seizures.      The most recent grand mal seizure occurred on 2021.  The most recent absence seizure occurred approximately in .       She denied adverse effects of the AEDs, although she is tired at times during the day.       She has not had any recent migraine headaches.  She occasionally has mild aching headaches that rapidly resolve with one dose of an OTC analgesic.       PAST MEDICAL-SURGICAL HISTORY:    1.  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     2.  Chronic migraine headaches.   3.  Status post  section.      MEDICATIONS:  Levetiracetam 2000 mg b.i.d., lamotrigine 100/200 mg daily, and other medications as per the electronic medical record.       VIDEO OBSERVATIONS:   The patient spoke with normal articulation, fluency and syntax, with normal comprehension of questions.    On command, the patient moved the direction of gaze into all 4 quadrants conjugately and without  nystagmus.   Facial movements were normal.    Tongue protruded on the midline.    The patient did not display any resting tremors or action tremors of the outstretched arms.  Limb movements were normal.       IMPRESSION:   The patient has not had seizures for longer than a year, on the current AED regimen.      She had a grand mal seizure in February 2021.  She then increased her levetiracetam dose to a higher dose that she had used previously, but she did not call this clinic to report the seizure.  Currently she is not having any definite adverse AED effects, but a times is drowsy during the day.  She did stop driving for 3 months after the seizure.     She had a levetiracetam level of 113 mcg/ml, and a lamotrigine level of 5.4 mcg/ml, on 08/11/2021, when she probably was in steady state on the current doses.  I told her that a more effective long-term regimen would probably provide a lower levetiracetam level and a higher lamotrigine level than this.  On the adjusted doses, the levetiracetam level was 45 mcg/ml, and a lamotrigine level of 6.0 mcg/ml.    We discussed the possibility of gradually converting to lamotrigine monotherapy.  The current lamotrigine level may not be adequate for monotherapy [y.  We will make a lamotrigine increased and a levetiracetam decrease today, but not discontinue levetiracetam at this time.       She appeared to clearly understand that she is prohibited from operating a motor vehicle within 3 months following any seizure or other episode with sudden unconsciousness or inability to sit up, and that she is required to report any future such seizure to the [facility] within 30 days after the event.       PLAN:   1)  Decrease levetiracetam to 1000/2000 mg daily.   2)  Increase lamotrigine to 200/300 mg daily.   3)  Return in 4 months for in-person visit.    Video Conference via [employer].   Video Start Time: 10:14 a.m.   Video Stop Time: 10:36 a.m.   Provider location: [facility]    Reported Patient Location: Home      I personally spent 33 minutes in this patient care on the day of this visit, including time in direct contact with the patient of which more than 50% consisted of counseling and coordinating care, and time in other necessary patient care activities without direct patient contact including medical record and imaging review.        Yassine Eduardo M.D., Professor of Neurology

## 2022-06-21 ENCOUNTER — THERAPY VISIT (OUTPATIENT)
Dept: PHYSICAL THERAPY | Facility: CLINIC | Age: 62
End: 2022-06-21
Payer: COMMERCIAL

## 2022-06-21 DIAGNOSIS — M54.9 BACK PAIN: Primary | ICD-10-CM

## 2022-06-21 PROCEDURE — 97110 THERAPEUTIC EXERCISES: CPT | Mod: GP | Performed by: PHYSICAL THERAPIST

## 2022-06-21 NOTE — PROGRESS NOTES
DISCHARGE REPORT    Progress reporting period is from 5/10/22 to 6/21/22.       SUBJECTIVE   Subjective: Pt notes that still some hip stiffness in the morning. Has been able to be biking and walking.       Current Pain level: 1/10.      Initial Pain level: 5/10.   Changes in function:  Yes (See Goal flowsheet attached for changes in current functional level)  Adverse reaction to treatment or activity: None    OBJECTIVE  Changes noted in objective findings:  Yes,   Objective: R hip ROM end range ER, flexion discomfort. + ZARA. Full lumbar ROM. Negative SLR     ASSESSMENT/PLAN  Updated problem list and treatment plan: Diagnosis 1:  LBP with radiculopathy  Decreased ROM/flexibility - home program  Decreased strength - home program  STG/LTGs have been met or progress has been made towards goals:  Yes (See Goal flow sheet completed today.)  Assessment of Progress: The patient has met all of their long term goals.  Self Management Plans:  Patient is independent in a home treatment program.  Patient is independent in self management of symptoms.  I have re-evaluated this patient and find that the nature, scope, duration and intensity of the therapy is appropriate for the medical condition of the patient.  Magaly continues to require the following intervention to meet STG and LTG's:  PT intervention is no longer required to meet STG/LTG.    Recommendations:  This patient is ready to be discharged from therapy and continue their home treatment program.    Please refer to the daily flowsheet for treatment today, total treatment time and time spent performing 1:1 timed codes.

## 2022-06-29 ENCOUNTER — NURSE TRIAGE (OUTPATIENT)
Dept: NURSING | Facility: CLINIC | Age: 62
End: 2022-06-29

## 2022-06-29 DIAGNOSIS — M54.41 RIGHT-SIDED LOW BACK PAIN WITH RIGHT-SIDED SCIATICA, UNSPECIFIED CHRONICITY: ICD-10-CM

## 2022-06-29 RX ORDER — OXYCODONE HYDROCHLORIDE 5 MG/1
5 TABLET ORAL EVERY 6 HOURS PRN
Qty: 10 TABLET | Refills: 0 | Status: SHIPPED | OUTPATIENT
Start: 2022-06-29 | End: 2022-07-25

## 2022-06-29 NOTE — TELEPHONE ENCOUNTER
Dr Michelle Arguelles is her MD. Patient would like a refill of oxycodone to the [facility] pharmacy. Pain is right hip and last couple of days it's at 7 out of 10. Pain med relieves it and she needs a refill.  Please call her at:  [phone].  May leave a detailed message. I sent the message to her primary and the clinic refill pool.  Klarissa Will RN  [facility]

## 2022-06-29 NOTE — TELEPHONE ENCOUNTER
Dr Michelle Arguelles is her MD. Patient would like a refill of oxycodone to the [facility] pharmacy. Pain is right hip and last couple of days it's at 7 out of 10. Pain med relieves it and she needs a refill.  Please call her at:  [phone].  May leave a detailed message.  Klarissa Will RN  [facility]

## 2022-06-29 NOTE — TELEPHONE ENCOUNTER
Routing refill request to provider for review/approval because:  Drug not on the [facility] refill protocol   Thank you.  Raheel Price RN

## 2022-07-25 ENCOUNTER — OFFICE VISIT (OUTPATIENT)
Dept: FAMILY MEDICINE | Facility: CLINIC | Age: 62
End: 2022-07-25
Payer: COMMERCIAL

## 2022-07-25 VITALS
SYSTOLIC BLOOD PRESSURE: 98 MMHG | WEIGHT: 149.4 LBS | HEART RATE: 69 BPM | BODY MASS INDEX: 24.01 KG/M2 | HEIGHT: 66 IN | TEMPERATURE: 96.7 F | DIASTOLIC BLOOD PRESSURE: 62 MMHG

## 2022-07-25 DIAGNOSIS — G40.409 OTHER GENERALIZED EPILEPSY, NOT INTRACTABLE, WITHOUT STATUS EPILEPTICUS (H): ICD-10-CM

## 2022-07-25 DIAGNOSIS — Z12.31 VISIT FOR SCREENING MAMMOGRAM: ICD-10-CM

## 2022-07-25 DIAGNOSIS — Z00.00 ENCOUNTER FOR ROUTINE ADULT HEALTH EXAMINATION WITHOUT ABNORMAL FINDINGS: Primary | ICD-10-CM

## 2022-07-25 DIAGNOSIS — D72.818 OTHER DECREASED WHITE BLOOD CELL COUNT: ICD-10-CM

## 2022-07-25 DIAGNOSIS — Z78.0 ASYMPTOMATIC POSTMENOPAUSAL STATUS: ICD-10-CM

## 2022-07-25 DIAGNOSIS — Z12.4 CERVICAL CANCER SCREENING: ICD-10-CM

## 2022-07-25 DIAGNOSIS — Z12.11 SCREEN FOR COLON CANCER: ICD-10-CM

## 2022-07-25 DIAGNOSIS — M25.551 HIP PAIN, RIGHT: ICD-10-CM

## 2022-07-25 DIAGNOSIS — Z13.220 SCREENING FOR HYPERLIPIDEMIA: ICD-10-CM

## 2022-07-25 LAB
ALBUMIN SERPL-MCNC: 4.1 G/DL (ref 3.4–5)
ALP SERPL-CCNC: 68 U/L (ref 40–150)
ALT SERPL W P-5'-P-CCNC: 27 U/L (ref 0–50)
ANION GAP SERPL CALCULATED.3IONS-SCNC: 6 MMOL/L (ref 3–14)
AST SERPL W P-5'-P-CCNC: 16 U/L (ref 0–45)
BILIRUB SERPL-MCNC: 0.3 MG/DL (ref 0.2–1.3)
BUN SERPL-MCNC: 23 MG/DL (ref 7–30)
CALCIUM SERPL-MCNC: 8.8 MG/DL (ref 8.5–10.1)
CHLORIDE BLD-SCNC: 109 MMOL/L (ref 94–109)
CHOLEST SERPL-MCNC: 218 MG/DL
CO2 SERPL-SCNC: 25 MMOL/L (ref 20–32)
CREAT SERPL-MCNC: 0.69 MG/DL (ref 0.52–1.04)
FASTING STATUS PATIENT QL REPORTED: YES
GFR SERPL CREATININE-BSD FRML MDRD: >90 ML/MIN/1.73M2
GLUCOSE BLD-MCNC: 101 MG/DL (ref 70–99)
HDLC SERPL-MCNC: 68 MG/DL
LDLC SERPL CALC-MCNC: 139 MG/DL
NONHDLC SERPL-MCNC: 150 MG/DL
POTASSIUM BLD-SCNC: 4.3 MMOL/L (ref 3.4–5.3)
PROT SERPL-MCNC: 7.2 G/DL (ref 6.8–8.8)
SODIUM SERPL-SCNC: 140 MMOL/L (ref 133–144)
TRIGL SERPL-MCNC: 57 MG/DL

## 2022-07-25 PROCEDURE — 80053 COMPREHEN METABOLIC PANEL: CPT | Performed by: PHYSICIAN ASSISTANT

## 2022-07-25 PROCEDURE — G0145 SCR C/V CYTO,THINLAYER,RESCR: HCPCS | Performed by: PHYSICIAN ASSISTANT

## 2022-07-25 PROCEDURE — 87624 HPV HI-RISK TYP POOLED RSLT: CPT | Performed by: PHYSICIAN ASSISTANT

## 2022-07-25 PROCEDURE — 80061 LIPID PANEL: CPT | Performed by: PHYSICIAN ASSISTANT

## 2022-07-25 PROCEDURE — 99396 PREV VISIT EST AGE 40-64: CPT | Performed by: PHYSICIAN ASSISTANT

## 2022-07-25 PROCEDURE — 36415 COLL VENOUS BLD VENIPUNCTURE: CPT | Performed by: PHYSICIAN ASSISTANT

## 2022-07-25 RX ORDER — OXYCODONE HYDROCHLORIDE 5 MG/1
5 TABLET ORAL EVERY 6 HOURS PRN
Qty: 10 TABLET | Refills: 0 | Status: SHIPPED | OUTPATIENT
Start: 2022-07-25 | End: 2022-10-19

## 2022-07-25 ASSESSMENT — ENCOUNTER SYMPTOMS
HEMATOCHEZIA: 0
COUGH: 0
PARESTHESIAS: 0
EYE PAIN: 0
MYALGIAS: 0
DYSURIA: 0
NERVOUS/ANXIOUS: 0
ABDOMINAL PAIN: 0
HEARTBURN: 0
FREQUENCY: 0
HEADACHES: 0
JOINT SWELLING: 0
SHORTNESS OF BREATH: 0
CHILLS: 0
PALPITATIONS: 0
FEVER: 0
HEMATURIA: 0
SORE THROAT: 0
WEAKNESS: 0
NAUSEA: 0
DIARRHEA: 0
CONSTIPATION: 0
DIZZINESS: 0

## 2022-07-25 NOTE — LETTER
July 29, 2022      Karen Renee  [address] [place] [address]        Dear ,    We are writing to inform you of your test results.    Here are your cholesterol results: Your LDL (bad cholesterol) is 139 (normal is less than 130).  Your HDL (good cholesterol) is 68 (normal for women is 50 or greater, normal for men is 40 or greater).  Your triglycerides (fats in the blood) are 57 (normal is less than 150).     Glucose is slightly high. We should keep monitoring that yearly to make sure it does not progress.     In general for heart health a Mediterranean style diet is recommended.      Resulted Orders   Comprehensive metabolic panel (BMP + Alb, Alk Phos, ALT, AST, Total. Bili, TP)   Result Value Ref Range    Sodium 140 133 - 144 mmol/L    Potassium 4.3 3.4 - 5.3 mmol/L    Chloride 109 94 - 109 mmol/L    Carbon Dioxide (CO2) 25 20 - 32 mmol/L    Anion Gap 6 3 - 14 mmol/L    Urea Nitrogen 23 7 - 30 mg/dL    Creatinine 0.69 0.52 - 1.04 mg/dL    Calcium 8.8 8.5 - 10.1 mg/dL    Glucose 101 (H) 70 - 99 mg/dL    Alkaline Phosphatase 68 40 - 150 U/L    AST 16 0 - 45 U/L    ALT 27 0 - 50 U/L    Protein Total 7.2 6.8 - 8.8 g/dL    Albumin 4.1 3.4 - 5.0 g/dL    Bilirubin Total 0.3 0.2 - 1.3 mg/dL    GFR Estimate >90 >60 mL/min/1.73m2      Comment:      Effective December 21, 2021 eGFRcr in adults is calculated using the 2021 CKD-EPI creatinine equation which includes age and gender (Suhail et al., NEJM, DOI: 10.1056/USWSbu6405458)   Lipid panel reflex to direct LDL Fasting   Result Value Ref Range    Cholesterol 218 (H) <200 mg/dL    Triglycerides 57 <150 mg/dL    Direct Measure HDL 68 >=50 mg/dL    LDL Cholesterol Calculated 139 (H) <=100 mg/dL    Non HDL Cholesterol 150 (H) <130 mg/dL    Patient Fasting > 8hrs? Yes     Narrative    Cholesterol  Desirable:  <200 mg/dL    Triglycerides  Normal:  Less than 150 mg/dL  Borderline High:  150-199 mg/dL  High:  200-499 mg/dL  Very High:  Greater than or  equal to 500 mg/dL    Direct Measure HDL  Female:  Greater than or equal to 50 mg/dL   Male:  Greater than or equal to 40 mg/dL    LDL Cholesterol  Desirable:  <100mg/dL  Above Desirable:  100-129 mg/dL   Borderline High:  130-159 mg/dL   High:  160-189 mg/dL   Very High:  >= 190 mg/dL    Non HDL Cholesterol  Desirable:  130 mg/dL  Above Desirable:  130-159 mg/dL  Borderline High:  160-189 mg/dL  High:  190-219 mg/dL  Very High:  Greater than or equal to 220 mg/dL       If you have any questions or concerns, please call the clinic at the number listed above.       Sincerely,      Michelle Arguelles PA-C

## 2022-07-25 NOTE — PROGRESS NOTES
SUBJECTIVE:   CC: Magaly Renee is an 61 year old woman who presents for preventive health visit.       Patient has been advised of split billing requirements and indicates understanding: Yes  Healthy Habits:     Getting at least 3 servings of Calcium per day:  NO    Bi-annual eye exam:  NO    Dental care twice a year:  Yes    Sleep apnea or symptoms of sleep apnea:  None    Diet:  Carbohydrate counting    Frequency of exercise:  4-5 days/week    Duration of exercise:  30-45 minutes    Taking medications regularly:  Yes    Medication side effects:  None    PHQ-2 Total Score: 0    Additional concerns today:  No    - has lost weight intentionally - more active, changing eating in the summer  - blood pressure runs low - asymptomatic  - epilepsy: follows with neurology. 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 Declines recheck.  - R hip pain is much improved. Was not doing well up until a few weeks ago, but doing better now. Would like oxycodone refill due to upcoming road trip bringing son back to college.    06/29/2022  1   06/29/2022  Oxycodone Hcl (Ir) 5 MG Tablet    10.00  3 Re Pan   [id number]   [facility] (5750)   0/0  25.00 MME  Comm Ins   [place]   05/20/2022  1   05/20/2022  Oxycodone Hcl (Ir) 5 MG Tablet    10.00  3 Re Pan   [id number]   [facility] (5750)   0/0  25.00 MME  [place]   05/13/2022  1   05/13/2022  Oxycodone Hcl (Ir) 5 MG Tablet    5.00  2 Re Pan   [id number]   [facility] (5750)   0/0  18.75 MME  [place]   [place]   05/02/2022  1   05/02/2022  Oxycodone Hcl (Ir) 10 MG Tab                     Today's PHQ-2 Score: 0  PHQ-2 ( 1999 [employer]) 7/25/2022   Q1: Little interest or pleasure in doing things 0   Q2: Feeling down, depressed or hopeless 0   PHQ-2 Score 0   PHQ-2 Total Score (12-17 Years)- Positive if 3 or more points; Administer PHQ-A if positive -   Q1: Little interest or pleasure in doing things Not at all   Q2: Feeling down, depressed or hopeless Not at all   PHQ-2 Score  0       Abuse: Current or Past (Physical, Sexual or Emotional) - No  Do you feel safe in your environment? Yes        Social History     Tobacco Use     Smoking status: Never Smoker     Smokeless tobacco: Never Used   Substance Use Topics     Alcohol use: Yes     Comment: occassion     If you drink alcohol do you typically have >3 drinks per day or >7 drinks per week? No    Alcohol Use 2022   Prescreen: >3 drinks/day or >7 drinks/week? No   Prescreen: >3 drinks/day or >7 drinks/week? -     Reviewed orders with patient.  Reviewed health maintenance and updated orders accordingly - Yes  Lab work is in process  Labs reviewed in EPIC  BP Readings from Last 3 Encounters:   22 98/62   22 94/62   22 100/67    Wt Readings from Last 3 Encounters:   22 67.8 kg (149 lb 6.4 oz)   22 72.7 kg (160 lb 3.2 oz)   22 71.2 kg (157 lb)                  Patient Active Problem List   Diagnosis     Other generalized epilepsy, not intractable, without status epilepticus (H)     Persistent Leukopenia     Migraine headache     Disorder of optic chiasm     Past Surgical History:   Procedure Laterality Date     C/SECTION, LOW TRANSVERSE      , Low Transverse       Social History     Tobacco Use     Smoking status: Never Smoker     Smokeless tobacco: Never Used   Substance Use Topics     Alcohol use: Yes     Comment: occassion     Family History   Problem Relation Age of Onset     Neurologic Disorder Mother         migraine     Diabetes No family hx of      C.A.D. No family hx of      Hypertension No family hx of      Cancer - colorectal No family hx of      Prostate Cancer No family hx of      Breast Cancer No family hx of      Cerebrovascular Disease No family hx of      Osteoporosis No family hx of            Breast Cancer Screening:    Breast CA Risk Assessment (FHS-7) 2022   Do you have a family history of breast, colon, or ovarian cancer? No / Unknown     Mammogram Screening:  "Recommended mammography every 1-2 years with patient discussion and risk factor consideration  Pertinent mammograms are reviewed under the imaging tab.    History of abnormal Pap smear: NO - age 30-65 PAP every 5 years with negative HPV co-testing recommended  PAP / HPV 2012   PAP (Historical) NIL     Reviewed and updated as needed this visit by clinical staff   Tobacco  Allergies  Meds  Problems  Med Hx  Surg Hx  Fam Hx  Soc   Hx          Reviewed and updated as needed this visit by Provider   Tobacco  Allergies  Meds  Problems  Med Hx  Surg Hx  Fam Hx           Past Medical History:   Diagnosis Date     Localization-related epilepsy (H)      Migraine      Migraine, unspecified, without mention of intractable migraine without mention of status migrainosus     Migraine     Other convulsions age of 12    Epilepsy      Past Surgical History:   Procedure Laterality Date     C/SECTION, LOW TRANSVERSE      , Low Transverse       Review of Systems   Constitutional: Negative for chills and fever.   HENT: Negative for congestion, ear pain, hearing loss and sore throat.    Eyes: Negative for pain and visual disturbance.   Respiratory: Negative for cough and shortness of breath.    Cardiovascular: Negative for chest pain, palpitations and peripheral edema.   Gastrointestinal: Negative for abdominal pain, constipation, diarrhea, heartburn, hematochezia and nausea.   Genitourinary: Negative for dysuria, frequency, genital sores, hematuria and urgency.   Musculoskeletal: Negative for joint swelling and myalgias.   Skin: Negative for rash.   Neurological: Negative for dizziness, weakness, headaches and paresthesias.   Psychiatric/Behavioral: Negative for mood changes. The patient is not nervous/anxious.       OBJECTIVE:   BP 98/62   Pulse 69   Temp (!) 96.7  F (35.9  C)   Ht 1.664 m (5' 5.5\")   Wt 67.8 kg (149 lb 6.4 oz)   LMP 2016 (LMP Unknown)   BMI 24.48 kg/m    Physical " Exam  GENERAL: healthy, alert and no distress  EYES: Eyes grossly normal to inspection, PERRL and conjunctivae and sclerae normal  HENT: ear canals and TM's normal, nose and mouth without ulcers or lesions  NECK: no adenopathy, no asymmetry, masses, or scars and thyroid normal to palpation  RESP: lungs clear to auscultation - no rales, rhonchi or wheezes  BREAST: normal without masses, tenderness or nipple discharge and no palpable axillary masses or adenopathy  CV: regular rate and rhythm, normal S1 S2, no S3 or S4, no murmur, click or rub, no peripheral edema and peripheral pulses strong  ABDOMEN: soft, nontender, no hepatosplenomegaly, no masses and bowel sounds normal   (female): normal female external genitalia, normal urethral meatus, vaginal mucosa, normal cervix/adnexa/uterus without masses or discharge  MS: no gross musculoskeletal defects noted, no edema  SKIN: no suspicious lesions or rashes  NEURO: Normal strength and tone, mentation intact and speech normal  BACK: no CVA tenderness, no paralumbar tenderness  PSYCH: mentation appears normal, affect normal/bright  LYMPH: no cervical, supraclavicular, axillary, or inguinal adenopathy    Diagnostic Test Results:  Labs reviewed in Epic    ASSESSMENT/PLAN:     ASSESSMENT/PLAN:      ICD-10-CM    1. Encounter for routine adult health examination without abnormal findings  Z00.00    2. Screen for colon cancer  Z12.11 COLOGUARD(EXACT SCIENCES)   3. Screening for hyperlipidemia  Z13.220 Lipid panel reflex to direct LDL Fasting     Lipid panel reflex to direct LDL Fasting   4. Cervical cancer screening  Z12.4 Pap Screen with HPV - recommended age 30 - 65 years     HPV Hold (Lab Only)     HPV High Risk Types DNA Cervical   5. Visit for screening mammogram  Z12.31 MA SCREENING DIGITAL BILAT - Future  (s+30)   6. Hip pain, right  M25.551 oxyCODONE (ROXICODONE) 5 MG tablet   7. Other generalized epilepsy, not intractable, without status epilepticus (H)  G40.409  "Comprehensive metabolic panel (BMP + Alb, Alk Phos, ALT, AST, Total. Bili, TP)     Comprehensive metabolic panel (BMP + Alb, Alk Phos, ALT, AST, Total. Bili, TP)   8. Asymptomatic postmenopausal status  Z78.0 DX Hip/Pelvis/Spine   9. Persistent Leukopenia  D72.818      Discussed risks of narcotic usage.    Patient Instructions   Follow up for your physical next year unless you need further refills of oxycodone  adequate calcium (1200 mg daily) and vitamin D intake (cholecalciferol 800 units daily)         COUNSELING:  Reviewed preventive health counseling, as reflected in patient instructions       Regular exercise       Healthy diet/nutrition       Osteoporosis prevention/bone health       The 10-year ASCVD risk score (Tanner BARAKAT JrJan, et al., 2013) is: 1.8%    Values used to calculate the score:      Age: 61 years      Sex: Female      Is Non- : No      Diabetic: No      Tobacco smoker: No      Systolic Blood Pressure: 98 mmHg      Is BP treated: No      HDL Cholesterol: 68 mg/dL      Total Cholesterol: 218 mg/dL    Estimated body mass index is 24.48 kg/m  as calculated from the following:    Height as of this encounter: 1.664 m (5' 5.5\").    Weight as of this encounter: 67.8 kg (149 lb 6.4 oz).    She reports that she has never smoked. She has never used smokeless tobacco.      Counseling Resources:  ATP IV Guidelines  Pooled Cohorts Equation Calculator  Breast Cancer Risk Calculator  BRCA-Related Cancer Risk Assessment: FHS-7 Tool  FRAX Risk Assessment  ICSI Preventive Guidelines  Dietary Guidelines for Americans, 2010  USDA's MyPlate  ASA Prophylaxis  Lung CA Screening    Michelle Arguelles PA-C  [facility]  "

## 2022-07-25 NOTE — PATIENT INSTRUCTIONS
Follow up for your physical next year unless you need further refills of oxycodone  adequate calcium (1200 mg daily) and vitamin D intake (cholecalciferol 800 units daily)

## 2022-07-27 LAB
BKR LAB AP GYN ADEQUACY: NORMAL
BKR LAB AP GYN INTERPRETATION: NORMAL
BKR LAB AP HPV REFLEX: NORMAL
BKR LAB AP PREVIOUS ABNORMAL: NORMAL
PATH REPORT.COMMENTS IMP SPEC: NORMAL
PATH REPORT.COMMENTS IMP SPEC: NORMAL
PATH REPORT.RELEVANT HX SPEC: NORMAL

## 2022-07-27 NOTE — TELEPHONE ENCOUNTER
Patient results indicate \"Seen\" in the [facility]. No further notification needed per policy.  pharmacy calling to check status of refill for pt    Please call    Veronica Daniel  Specialty CSS

## 2022-07-28 LAB
HUMAN PAPILLOMA VIRUS 16 DNA: NEGATIVE
HUMAN PAPILLOMA VIRUS 18 DNA: NEGATIVE
HUMAN PAPILLOMA VIRUS FINAL DIAGNOSIS: NORMAL
HUMAN PAPILLOMA VIRUS OTHER HR: NEGATIVE

## 2022-08-21 DIAGNOSIS — G40.409 OTHER GENERALIZED EPILEPSY, NOT INTRACTABLE, WITHOUT STATUS EPILEPTICUS (H): ICD-10-CM

## 2022-08-24 RX ORDER — LEVETIRACETAM 1000 MG/1
2000 TABLET ORAL 2 TIMES DAILY
Qty: 120 TABLET | Refills: 11 | Status: SHIPPED | OUTPATIENT
Start: 2022-08-24 | End: 2022-12-06

## 2022-08-25 NOTE — TELEPHONE ENCOUNTER
LEVETIRACETAM 1,000 MG TABLET      Last Written Prescription Date:  5/18/22  Last Fill Quantity: 360,   # refills: 0  Last Office Visit : 6/14/22  Future Office visit:  10/25/22      Routing refill request to provider for review/approval because:  Refill request and med list does not match last visit note:  PLAN:   1)  Decrease levetiracetam to 1000/2000 mg daily  Thank-you, Ruby HAWTHORNE RN Medication Refill Team

## 2022-09-13 ENCOUNTER — TRANSFERRED RECORDS (OUTPATIENT)
Dept: HEALTH INFORMATION MANAGEMENT | Facility: CLINIC | Age: 62
End: 2022-09-13

## 2022-10-19 ENCOUNTER — OFFICE VISIT (OUTPATIENT)
Dept: FAMILY MEDICINE | Facility: CLINIC | Age: 62
End: 2022-10-19
Payer: COMMERCIAL

## 2022-10-19 VITALS
BODY MASS INDEX: 23.98 KG/M2 | TEMPERATURE: 97.4 F | RESPIRATION RATE: 16 BRPM | HEART RATE: 68 BPM | OXYGEN SATURATION: 96 % | HEIGHT: 66 IN | DIASTOLIC BLOOD PRESSURE: 64 MMHG | WEIGHT: 149.2 LBS | SYSTOLIC BLOOD PRESSURE: 92 MMHG

## 2022-10-19 DIAGNOSIS — Z01.818 PREOP GENERAL PHYSICAL EXAM: Primary | ICD-10-CM

## 2022-10-19 DIAGNOSIS — Z12.11 SCREEN FOR COLON CANCER: ICD-10-CM

## 2022-10-19 DIAGNOSIS — M54.41 RIGHT-SIDED LOW BACK PAIN WITH RIGHT-SIDED SCIATICA, UNSPECIFIED CHRONICITY: ICD-10-CM

## 2022-10-19 DIAGNOSIS — G40.409 OTHER GENERALIZED EPILEPSY, NOT INTRACTABLE, WITHOUT STATUS EPILEPTICUS (H): ICD-10-CM

## 2022-10-19 DIAGNOSIS — M25.551 HIP PAIN, RIGHT: ICD-10-CM

## 2022-10-19 PROCEDURE — 93000 ELECTROCARDIOGRAM COMPLETE: CPT | Performed by: FAMILY MEDICINE

## 2022-10-19 PROCEDURE — 99214 OFFICE O/P EST MOD 30 MIN: CPT | Performed by: FAMILY MEDICINE

## 2022-10-19 ASSESSMENT — PAIN SCALES - GENERAL: PAINLEVEL: SEVERE PAIN (6)

## 2022-10-19 NOTE — PATIENT INSTRUCTIONS
Preparing for Your Surgery  Getting started  A nurse will call you to review your health history and instructions. They will give you an arrival time based on your scheduled surgery time. Please be ready to share:    Your doctor's clinic name and phone number    Your medical, surgical and anesthesia history    A list of allergies and sensitivities    A list of medicines, including herbal treatments and over-the-counter drugs    Whether the patient has a legal guardian (ask how to send us the papers in advance)  Please tell us if you're pregnant--or if there's any chance you might be pregnant. Some surgeries may injure a fetus (unborn baby), so they require a pregnancy test. Surgeries that are safe for a fetus don't always need a test, and you can choose whether to have one.   If you have a child who's having surgery, please ask for a copy of Preparing for Your Child's Surgery.    Preparing for surgery    Within 10 to 30 days of surgery: Have a pre-op exam (sometimes called an H&P, or History and Physical). This can be done at a clinic or pre-operative center.  ? If you're having a , you may not need this exam. Talk to your care team.    At your pre-op exam, talk to your care team about all medicines you take. If you need to stop any medicines before surgery, ask when to start taking them again.  ? We do this for your safety. Many medicines can make you bleed too much during surgery. Some change how well surgery (anesthesia) drugs work.    Call your insurance company to let them know you're having surgery. (If you don't have insurance, call [phone].)    Call your clinic if there's any change in your health. This includes signs of a cold or flu (sore throat, runny nose, cough, rash, fever). It also includes a scrape or scratch near the surgery site.    If you have questions on the day of surgery, call your hospital or surgery center.  COVID testing  You may need to be tested for COVID-19 before having  surgery. If so, we will give you instructions (or click here).  Eating and drinking guidelines  For your safety: Unless your surgeon tells you otherwise, follow the guidelines below.    Eat and drink as usual until 8 hours before surgery. After that, no food or milk.    Drink clear liquids until 2 hours before surgery. These are liquids you can see through, like water, Gatorade and Propel Water. You may also have black coffee and tea (no cream or milk).    Nothing by mouth within 2 hours of surgery. This includes gum, candy and breath mints.    If you drink alcohol: Stop drinking it the night before surgery.    If your care team tells you to take medicine on the morning of surgery, it's okay to take it with a sip of water.  Preventing infection    Shower or bathe the night before and morning of your surgery. Follow the instructions your clinic gave you. (If no instructions, use regular soap.)    Don't shave or clip hair near your surgery site. We'll remove the hair if needed.    Don't smoke or vape the morning of surgery. You may chew nicotine gum up to 2 hours before surgery. A nicotine patch is okay.  ? Note: Some surgeries require you to completely quit smoking and nicotine. Check with your surgeon.    Your care team will make every effort to keep you safe from infection. We will:  ? Clean our hands often with soap and water (or an alcohol-based hand rub).  ? Clean the skin at your surgery site with a special soap that kills germs.  ? Give you a special gown to keep you warm. (Cold raises the risk of infection.)  ? Wear special hair covers, masks, gowns and gloves during surgery.  ? Give antibiotic medicine, if prescribed. Not all surgeries need antibiotics.  What to bring on the day of surgery    Photo ID and insurance card    Copy of your health care directive, if you have one    Glasses and hearing aides (bring cases)  ? You can't wear contacts during surgery    Inhaler and eye drops, if you use them (tell us  about these when you arrive)    CPAP machine or breathing device, if you use them    A few personal items, if spending the night    If you have . . .  ? A pacemaker, ICD (cardiac defibrillator) or other implant: Bring the ID card.  ? An implanted stimulator: Bring the remote control.  ? A legal guardian: Bring a copy of the certified (court-stamped) guardianship papers.  Please remove any jewelry, including body piercings. Leave jewelry and other valuables at home.  If you're going home the day of surgery    You must have a responsible adult drive you home. They should stay with you overnight as well.    If you don't have someone to stay with you, and you aren't safe to go home alone, we may keep you overnight. Insurance often won't pay for this.  After surgery  If it's hard to control your pain or you need more pain medicine, please call your surgeon's office.  Questions?   If you have any questions for your care team, list them here: _________________________________________________________________________________________________________________________________________________________________________ ____________________________________ ____________________________________ ____________________________________  For informational purposes only. Not to replace the advice of your health care provider. Copyright   2003, 2019 [facility]. All rights reserved. Clinically reviewed by Luci Louise MD. [employer] [id number] - REV 07/22.

## 2022-10-19 NOTE — PROGRESS NOTES
[facility]  [id number] [facility] [address]  Phone: [phone]  Primary Provider: Michelle Arguelles  Pre-op Performing Provider: MARIA FRANCO      PREOPERATIVE EVALUATION:  Today's date: 10/19/2022    Magaly Renee is a 62 year old female who presents for a preoperative evaluation.    Surgical Information:  Surgery/Procedure: Right Hip Replacement  Surgery Location: [facility]  Surgeon: Dr. Celestino Chase  Surgery Date:  10/27/22  Time of Surgery: TBD  Where patient plans to recover: At home with family  Fax number for surgical facility:  [phone]                     Type of Anesthesia Anticipated: to be determined    Assessment & Plan     The proposed surgical procedure is considered INTERMEDIATE risk.    Preop general physical exam    Hip pain, right  Okay for surgery      Other generalized epilepsy, not intractable, without status epilepticus (H)  Has not had a seizure in 10 years.  Has had grand mal, and petite  Zones out.     Right-sided low back pain with right-sided sciatica, unspecified chronicity  Has feroz well controlled.       Risks and Recommendations:  The patient has the following additional risks and recommendations for perioperative complications:   - No identified additional risk factors other than previously addressed    Medication Instructions:  Patient is on no chronic medications    RECOMMENDATION:  APPROVAL GIVEN to proceed with proposed procedure, without further diagnostic evaluation.      Independent interpretation of a test performed by another physician/other qualified health care professional (not separately reported) -     Subjective     HPI related to upcoming procedure:  Has long stqanding righ hip pain.     Preop Questions 10/19/2022   1. Have you ever had a heart attack or stroke? No   2. Have you ever had surgery on your heart or blood vessels, such as a stent placement, a coronary artery bypass, or surgery on an artery  in your head, neck, heart, or legs? No   3. Do you have chest pain with activity? No   4. Do you have a history of  heart failure? No   5. Do you currently have a cold, bronchitis or symptoms of other infection? No   6. Do you have a cough, shortness of breath, or wheezing? No   7. Do you or anyone in your family have previous history of blood clots? No   8. Do you or does anyone in your family have a serious bleeding problem such as prolonged bleeding following surgeries or cuts? No   9. Have you ever had problems with anemia or been told to take iron pills? No   10. Have you had any abnormal blood loss such as black, tarry or bloody stools, or abnormal vaginal bleeding? No   11. Have you ever had a blood transfusion? No   12. Are you willing to have a blood transfusion if it is medically needed before, during, or after your surgery? Yes   13. Have you or any of your relatives ever had problems with anesthesia? No   14. Do you have sleep apnea, excessive snoring or daytime drowsiness? No   15. Do you have any artifical heart valves or other implanted medical devices like a pacemaker, defibrillator, or continuous glucose monitor? No   16. Do you have artificial joints? No   17. Are you allergic to latex? No       Health Care Directive:  Patient does not have a Health Care Directive or Living Will:     Preoperative Review of :   reviewed - no record of controlled substances prescribed.      Status of Chronic Conditions:  See problem list for active medical problems.  Problems all longstanding and stable, except as noted/documented.  See ROS for pertinent symptoms related to these conditions.      Review of Systems  CONSTITUTIONAL: NEGATIVE for fever, chills, change in weight  ENT/MOUTH: NEGATIVE for ear, mouth and throat problems  RESP: NEGATIVE for significant cough or SOB  CV: NEGATIVE for chest pain, palpitations or peripheral edema    Patient Active Problem List    Diagnosis Date Noted     Disorder of optic  chiasm 2018     Priority: Medium     Migraine headache 2012     Priority: Medium     2012, Hx of migraine for > 6 years, [facility], he is in [place] ?, need meds refill of Fioricet with codeine.       Persistent Leukopenia 2008     Priority: Medium     ?Keppra vs other.  Check peripheral smear and consider hematology       Other generalized epilepsy, not intractable, without status epilepticus (H) 2006     Priority: Medium     Last Exam: 2006  Medications: Keppra, Carbatrol  Comments: Last seizure   Typically sees neurologist at [facility]  Problem list name updated by automated process. Provider to review        Past Medical History:   Diagnosis Date     Localization-related epilepsy (H)      Migraine      Migraine, unspecified, without mention of intractable migraine without mention of status migrainosus     Migraine     Other convulsions age of 12    Epilepsy     Past Surgical History:   Procedure Laterality Date     C/SECTION, LOW TRANSVERSE      , Low Transverse     Current Outpatient Medications   Medication Sig Dispense Refill     cyclobenzaprine (FLEXERIL) 10 MG tablet Take 1 tablet (10 mg) by mouth 3 times daily as needed for muscle spasms (Patient not taking: No sig reported) 30 tablet 1     ibuprofen (ADVIL/MOTRIN) 200 MG tablet Take 200 mg by mouth every 4 hours as needed for mild pain       lamoTRIgine (LAMICTAL) 100 MG tablet Take 2 tablets (200 mg) by mouth 2 times daily 360 tablet 3     levETIRAcetam (KEPPRA) 1000 MG tablet Take 2 tablets (2,000 mg) by mouth 2 times daily 120 tablet 11     oxyCODONE (ROXICODONE) 5 MG tablet Take 1 tablet (5 mg) by mouth every 6 hours as needed for pain 10 tablet 0       Allergies   Allergen Reactions     Bee Venom Anaphylaxis     Sulfa Drugs Other (See Comments)        Social History     Tobacco Use     Smoking status: Never      Smokeless tobacco: Never   Substance Use Topics     Alcohol use: Yes     Comment: occassion     Family History   Problem Relation Age of Onset     Neurologic Disorder Mother         migraine     Diabetes No family hx of      C.A.D. No family hx of      Hypertension No family hx of      Cancer - colorectal No family hx of      Prostate Cancer No family hx of      Breast Cancer No family hx of      Cerebrovascular Disease No family hx of      Osteoporosis No family hx of      History   Drug Use No         Objective     LMP 07/06/2016 (LMP Unknown)     Physical Exam  GENERAL APPEARANCE: healthy, alert and no distress  HENT: ear canals and TM's normal and nose and mouth without ulcers or lesions  RESP: lungs clear to auscultation - no rales, rhonchi or wheezes  CV: regular rate and rhythm, normal S1 S2, no S3 or S4 and no murmur, click or rub   ABDOMEN: soft, nontender, no HSM or masses and bowel sounds normal  NEURO: Normal strength and tone, sensory exam grossly normal, mentation intact and speech normal    Recent Labs   Lab Test 07/25/22  0800      POTASSIUM 4.3   CR 0.69        Diagnostics:  No labs were ordered during this visit.   EKG: appears normal, NSR, normal axis, normal intervals, no acute ST/T changes c/w ischemia, no LVH by voltage criteria, unchanged from previous tracings    Revised Cardiac Risk Index (RCRI):  The patient has the following serious cardiovascular risks for perioperative complications:   - No serious cardiac risks = 0 points     RCRI Interpretation: 1 point: Class II (low risk - 0.9% complication rate)           Signed Electronically by: Harshal Carreno MD  Copy of this evaluation report is provided to requesting physician.

## 2022-10-26 ENCOUNTER — LAB REQUISITION (OUTPATIENT)
Dept: LAB | Facility: HOSPITAL | Age: 62
End: 2022-10-26
Payer: COMMERCIAL

## 2022-10-26 DIAGNOSIS — Z01.818 ENCOUNTER FOR OTHER PREPROCEDURAL EXAMINATION: ICD-10-CM

## 2022-10-26 DIAGNOSIS — Z00.00 ENCOUNTER FOR GENERAL ADULT MEDICAL EXAMINATION WITHOUT ABNORMAL FINDINGS: ICD-10-CM

## 2022-10-26 LAB
ANION GAP SERPL CALCULATED.3IONS-SCNC: 11 MMOL/L (ref 7–15)
BUN SERPL-MCNC: 25.6 MG/DL (ref 8–23)
CALCIUM SERPL-MCNC: 9.8 MG/DL (ref 8.8–10.2)
CHLORIDE SERPL-SCNC: 103 MMOL/L (ref 98–107)
CREAT SERPL-MCNC: 0.61 MG/DL (ref 0.51–0.95)
DEPRECATED HCO3 PLAS-SCNC: 26 MMOL/L (ref 22–29)
ERYTHROCYTE [DISTWIDTH] IN BLOOD BY AUTOMATED COUNT: 12.2 % (ref 10–15)
GFR SERPL CREATININE-BSD FRML MDRD: >90 ML/MIN/1.73M2
GLUCOSE SERPL-MCNC: 101 MG/DL (ref 70–99)
HCT VFR BLD AUTO: 42.2 % (ref 35–47)
HGB BLD-MCNC: 13.7 G/DL (ref 11.7–15.7)
MCH RBC QN AUTO: 30.9 PG (ref 26.5–33)
MCHC RBC AUTO-ENTMCNC: 32.5 G/DL (ref 31.5–36.5)
MCV RBC AUTO: 95 FL (ref 78–100)
PLATELET # BLD AUTO: 236 10E3/UL (ref 150–450)
POTASSIUM SERPL-SCNC: 4.2 MMOL/L (ref 3.4–5.3)
RBC # BLD AUTO: 4.43 10E6/UL (ref 3.8–5.2)
SODIUM SERPL-SCNC: 140 MMOL/L (ref 136–145)
WBC # BLD AUTO: 3.5 10E3/UL (ref 4–11)

## 2022-10-26 PROCEDURE — 80048 BASIC METABOLIC PNL TOTAL CA: CPT | Mod: ORL | Performed by: CLINICAL NURSE SPECIALIST

## 2022-10-26 PROCEDURE — 85027 COMPLETE CBC AUTOMATED: CPT | Mod: ORL | Performed by: CLINICAL NURSE SPECIALIST

## 2022-10-26 PROCEDURE — 36415 COLL VENOUS BLD VENIPUNCTURE: CPT | Mod: ORL | Performed by: CLINICAL NURSE SPECIALIST

## 2022-10-27 ENCOUNTER — TRANSFERRED RECORDS (OUTPATIENT)
Dept: HEALTH INFORMATION MANAGEMENT | Facility: CLINIC | Age: 62
End: 2022-10-27

## 2022-11-03 ENCOUNTER — TELEPHONE (OUTPATIENT)
Dept: FAMILY MEDICINE | Facility: CLINIC | Age: 62
End: 2022-11-03

## 2022-11-03 NOTE — TELEPHONE ENCOUNTER
"Patient is calling in.  Patient has had right hip replacement surgery last Thursday  The pain of the surgery seems to be gone  Patient has a residual migraine that \"just wont quit\"  Patient states that in the past she was prescribed something for migraines but it was very long ago.  Patient is requesting appt with Kindra.    Patient did state she has stopped drinking caffeine about three days ago. Patient will take some advil tonight and drink a caffeine beverage and see if that helps her migraine headache.    Patient was scheduled on tele visit with Kindra tomorrow.    Matias MERCHANT [facility]    "

## 2022-11-04 ENCOUNTER — VIRTUAL VISIT (OUTPATIENT)
Dept: FAMILY MEDICINE | Facility: CLINIC | Age: 62
End: 2022-11-04
Payer: COMMERCIAL

## 2022-11-04 DIAGNOSIS — G43.009 MIGRAINE WITHOUT AURA AND WITHOUT STATUS MIGRAINOSUS, NOT INTRACTABLE: Primary | ICD-10-CM

## 2022-11-04 PROCEDURE — 99213 OFFICE O/P EST LOW 20 MIN: CPT | Mod: TEL | Performed by: PHYSICIAN ASSISTANT

## 2022-11-04 RX ORDER — OXYCODONE HYDROCHLORIDE 5 MG/1
5 TABLET ORAL EVERY 6 HOURS PRN
Qty: 12 TABLET | Refills: 0 | COMMUNITY
Start: 2022-11-04 | End: 2024-07-22

## 2022-11-04 RX ORDER — BUTALBITAL, ACETAMINOPHEN AND CAFFEINE 50; 325; 40 MG/1; MG/1; MG/1
1 TABLET ORAL EVERY 4 HOURS PRN
Qty: 12 TABLET | Refills: 0 | Status: SHIPPED | OUTPATIENT
Start: 2022-11-04 | End: 2023-02-28

## 2022-11-04 RX ORDER — ASPIRIN 325 MG
325 TABLET, DELAYED RELEASE (ENTERIC COATED) ORAL 2 TIMES DAILY
COMMUNITY
Start: 2022-11-04 | End: 2024-07-22

## 2022-11-04 NOTE — PATIENT INSTRUCTIONS
Use the fioricet if needed for migraine - do not use with oxycodone. Could cause more sedation / respiratory depression/ confusion  Discuss migraines with neurologist

## 2022-11-04 NOTE — PROGRESS NOTES
Karen is a 62 year old who is being evaluated via a billable telephone visit.      What phone number would you like to be contacted at? [phone]  How would you like to obtain your AVS? Evettehart    Assessment & Plan     ASSESSMENT/PLAN:      ICD-10-CM    1. Migraine without aura and without status migrainosus, not intractable  G43.009 butalbital-acetaminophen-caffeine (ESGIC) -40 MG tablet          Patient Instructions   Use the fioricet if needed for migraine - do not use with oxycodone. Could cause more sedation / respiratory depression/ confusion  Discuss migraines with neurologist    Return in about 9 months (around 8/4/2023) for Physical Exam.    JORGE Whaley [facility] JINNY Jones is a 62 year old, presenting for the following health issues:  No chief complaint on file.      HPI     *  Concerns about migraines, She does not get them very often but when she does they are bad. She feels much better today but would still like to discuss what she can be doing going forward. She just had hip replacement surgery last week     Sees neurology for seizures  2015, 2018 - see notes from neurology regarding migraines. - 2020 - last prescription for fioricet. imitrex low dose did not help. Tried maxalt in 2015.    She has rare migraines. Thinks triggered from hip replacement/medications.   Migraine settles into left eye. No n/v, photosensitivity, dizziness, aura, vision changes.  She is still taking oxycodone 1/2 pill from surgery as well as aspirin bid (not sure of dose)  Has neurology appointment in 1 month for follow up       Review of Systems   Other than noted above, general, HEENT, respiratory, cardiac, MS, and gastrointestinal systems are negative.       Objective           Vitals:  No vitals were obtained today due to virtual visit.    Physical Exam   healthy, alert and no distress  PSYCH: Alert and oriented times 3; coherent speech, normal   rate and volume, able to  articulate logical thoughts, able   to abstract reason, no tangential thoughts, no hallucinations   or delusions  Her affect is normal  RESP: No cough, no audible wheezing, able to talk in full sentences  Remainder of exam unable to be completed due to telephone visits        Phone call duration: 13 minutes

## 2022-11-09 ENCOUNTER — PATIENT OUTREACH (OUTPATIENT)
Dept: FAMILY MEDICINE | Facility: CLINIC | Age: 62
End: 2022-11-09

## 2022-11-09 NOTE — TELEPHONE ENCOUNTER
Patient Quality Outreach    Patient is due for the following:   Colon Cancer Screening  Physical Preventive Adult Physical    Next Steps:   Patient has upcoming appointment, these items will be addressed at that time.    Type of outreach:    Chart review performed, no outreach needed.    Next Steps:  Reach out within 90 days via Phone.    Max number of attempts reached: No. Will try again in 90 days if patient still on fail list.    Questions for provider review:    None     April Hidalgo, BERTHA  Chart routed to Care Team.

## 2022-11-17 ENCOUNTER — TRANSFERRED RECORDS (OUTPATIENT)
Dept: HEALTH INFORMATION MANAGEMENT | Facility: CLINIC | Age: 62
End: 2022-11-17

## 2022-11-17 DIAGNOSIS — G40.319 INTRACTABLE GENERALIZED EPILEPSY (H): ICD-10-CM

## 2022-11-21 ENCOUNTER — HEALTH MAINTENANCE LETTER (OUTPATIENT)
Age: 62
End: 2022-11-21

## 2022-11-22 RX ORDER — LAMOTRIGINE 100 MG/1
200 TABLET ORAL 2 TIMES DAILY
Qty: 360 TABLET | Refills: 3 | Status: SHIPPED | OUTPATIENT
Start: 2022-11-22 | End: 2022-12-06

## 2022-11-22 NOTE — TELEPHONE ENCOUNTER
LAMOTRIGINE 100 MG TABLET  Last Written Prescription Date:   1/11/2022  Last Fill Quantity: 360,   # refills: 3  Last Office Visit :  6/14/2022  Future Office visit:   12/6/2022  360 Tabs, 3 Refills sent to nai Curtis RN  Central Triage Red Flags/Med Refills

## 2022-11-28 ENCOUNTER — PATIENT OUTREACH (OUTPATIENT)
Dept: FAMILY MEDICINE | Facility: CLINIC | Age: 62
End: 2022-11-28

## 2022-11-28 NOTE — TELEPHONE ENCOUNTER
Patient Quality Outreach    Patient is due for the following:   Colon Cancer Screening  Physical Preventive Adult Physical    Next Steps:   Patient has upcoming appointment, these items will be addressed at that time.    Type of outreach:    Chart review performed, no outreach needed.    Next Steps:  Reach out within 90 days via AppTriggert.    Max number of attempts reached: No. Will try again in 90 days if patient still on fail list.    Questions for provider review:    None     April Hidalgo, BERTHA  Chart routed to Care Team.

## 2022-12-06 ENCOUNTER — LAB (OUTPATIENT)
Dept: LAB | Facility: CLINIC | Age: 62
End: 2022-12-06
Payer: COMMERCIAL

## 2022-12-06 ENCOUNTER — OFFICE VISIT (OUTPATIENT)
Dept: NEUROLOGY | Facility: CLINIC | Age: 62
End: 2022-12-06
Payer: COMMERCIAL

## 2022-12-06 VITALS
OXYGEN SATURATION: 95 % | HEART RATE: 75 BPM | DIASTOLIC BLOOD PRESSURE: 69 MMHG | SYSTOLIC BLOOD PRESSURE: 106 MMHG | RESPIRATION RATE: 16 BRPM

## 2022-12-06 DIAGNOSIS — G40.409 OTHER GENERALIZED EPILEPSY, NOT INTRACTABLE, WITHOUT STATUS EPILEPTICUS (H): Primary | ICD-10-CM

## 2022-12-06 DIAGNOSIS — G40.409 OTHER GENERALIZED EPILEPSY, NOT INTRACTABLE, WITHOUT STATUS EPILEPTICUS (H): ICD-10-CM

## 2022-12-06 DIAGNOSIS — G40.319 INTRACTABLE GENERALIZED EPILEPSY (H): ICD-10-CM

## 2022-12-06 LAB — LEVETIRACETAM SERPL-MCNC: 41.6 ΜG/ML (ref 10–40)

## 2022-12-06 PROCEDURE — 80177 DRUG SCRN QUAN LEVETIRACETAM: CPT | Performed by: PSYCHIATRY & NEUROLOGY

## 2022-12-06 PROCEDURE — 80175 DRUG SCREEN QUAN LAMOTRIGINE: CPT | Mod: 90 | Performed by: PATHOLOGY

## 2022-12-06 PROCEDURE — 99215 OFFICE O/P EST HI 40 MIN: CPT | Mod: GC | Performed by: PSYCHIATRY & NEUROLOGY

## 2022-12-06 PROCEDURE — 99000 SPECIMEN HANDLING OFFICE-LAB: CPT | Performed by: PATHOLOGY

## 2022-12-06 PROCEDURE — 36415 COLL VENOUS BLD VENIPUNCTURE: CPT | Performed by: PATHOLOGY

## 2022-12-06 RX ORDER — LEVETIRACETAM 1000 MG/1
2000 TABLET ORAL 2 TIMES DAILY
Qty: 120 TABLET | Refills: 11 | Status: SHIPPED | OUTPATIENT
Start: 2022-12-06 | End: 2023-08-07

## 2022-12-06 RX ORDER — LAMOTRIGINE 100 MG/1
TABLET ORAL
Qty: 450 TABLET | Refills: 3 | Status: SHIPPED | OUTPATIENT
Start: 2022-12-06 | End: 2023-01-30

## 2022-12-06 ASSESSMENT — PAIN SCALES - GENERAL: PAINLEVEL: NO PAIN (0)

## 2022-12-06 NOTE — PROGRESS NOTES
[facility]:  RETURN VISIT           Service Date: 12/06/2022     HISTORY:  Ms. Magaly Renee is a 62-year-old, right-handed woman who returned for follow-up of idiopathic generalized epilepsy with absence seizures and generalized tonic-clonic seizures.     Patient had a hip replacement surgery on 10/27/2022.  After which she was sent home on tapering dose of ketorolac and oxycodone.  After her surgery the next day morning the patient noticed that she was having absence seizure's which was witnessed by the patient's .  Both of them are not clear how many events she had and the patient endorsed that she was confused and did not come back to baseline in between a few of these episodes.  On the night of 28 October 2022 the patient's  endorsed that the patient had a grand mal seizure which lasted around 20 seconds.  The patient did not come into the hospital or inform [facility] regarding this grand mal episode.  The patient was requested to decrease Keppra dose 2000 mg in the morning and 2000 mg at night and Lamictal increased to 200 mg in the morning and 3 mg at night, which the patient did not follow as she was getting ready to get a hip replacement.  Hence the patient has stayed on 2 g twice daily of Keppra and 200 mg twice daily of Lamictal.     The most recent grand mal seizure occurred on October 29, 2022.     She denied adverse effects of the AEDs, although she is tired at times during the day.       She has not had any recent migraine headaches.       She denied adverse effects of the AEDs.       Ictal semiology-history:   The patient reported that she only ever had 2 types of seizures in her lifetime, as grand mal seizures and absence seizures.      The grand mal seizures first occurred at 14 years of age.  She thinks that she probably had 8-12 total grand mal seizures widely spread out over the years, usually with 3-year intervals or longer between seizures.   These could arise from sleep or waking.  When awake, the patient never had any sort of aura or prodrome, but simply would find herself postictally down on the floor or ground with standing at onset with a severe headache, confusion and occasionally evidence of tongue biting or urinary incontinence.  Her  saw several of these events that arose from sleep in bed at night, described as rigid trunk and leg extension with arm flexion up to the chest and rhythmic generalized jerking with complete unresponsiveness, lasting about 1 minute.  He often noted confused postictal behaviors for 15 minutes or longer after a seizure.      The absence seizures probably began sometime in the early teens.  She often had clusters with multiple absences on the same day, but with many days or even months between seizures, as recently as a decade ago.  The episodes since have declined in frequency and the last of these was witnessed, perhaps 5 years ago.  The patient herself would usually have no idea that one had occurred.  She never had any aura or postictal state with these.  Witnesses would simply note that she suddenly stopped talking and stopped moving, with no response to voice or touch, but with eyes open.  She would not fall down or change posture during these brief seizures.  Her  thought that these consistently lasted less than 20 seconds, and he did not witness any postictal state.      The patient denied that she has ever had a paroxysmal episode of sudden brief confusion, non-convulsive falling with unconsciousness, repetitive involuntary movements or posturing, or other paroxysmal phenomena.  She denied any history of convulsive status epilepticus, or complex partial status epilepticus.  She denied any history of sudden involuntary jerks while fully awake, but she has had hypnic jerks.       The patient does not recognize exacerbating or remitting factors with regard to seizure frequency.      Epilepsy-seizure  predispositions:   The patient has no family history of epilepsy or seizures.  She has no history of gestational or  injury, febrile convulsions, developmental delay, stroke, meningitis, encephalitis, significant head injury, or other epileptic predispositions.  She denied a history of physical or sexual abuse by an adult during her childhood or adulthood.       Laboratory evaluations:   Scanned records from [facility] indicate that a normal brain MRI was obtained on 2001.    Multiple interictal EEG recordings were interpreted as showing generalized spike wave discharges, but sometimes also as showing left parietal or other left-sided focal spike-wave complexes, with the earliest EEG report dating to 2001 at [facility].       The patient had a prolonged outpatient video EEG on 2018, at the [facility], which showed a burst of approximately 3-per-second spike-wave discharges which was atypical due to variable absence of spikes in the presence of high amplitude slow waves and which also showed a single focal left centroparietotemporal spike-wave complex.      A brain MRI detected no cerebral abnormalities, but showed focal enlargement of the left optic chiasm without associated enhancement, [facility].     Epilepsy therapeutics:   The patient remembers that initially after her first grand mal seizure, she was started directly on phenytoin and phenobarbital, which she found highly sedating.  Over the years she was converted to various combinations of clonazepam, carbamazepine, lamotrigine and levetiracetam.  For the last approximately 10 years, she has been on a combination of levetiracetam, lamotrigine and carbamazepine with intermittent periods of blurry or double vision lasting several hours.  These improved when she began to take her medications with meals, but she still has a severe episode at least once per month.      Other history:   The patient began having  migraine headaches in her 30S, which she usually were left retro-orbital or right retro-orbital in alternation.  These were not associated with nausea, vomiting, photophobia or phonophobia.  They might last many hours if untreated, but they are markedly aborted with the use of Fioricet.  They have improved over the years and currently are occurring about once per month or less.      PAST MEDICAL-SURGICAL HISTORY:    1.  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     2.  Chronic migraine headaches.   3.  Status post  section.      FAMILY HISTORY:  There is no family history of seizures or epilepsy.  Her mother had recurrent migraine headaches, but there is no other history of neurologic disease in the family.        PERSONAL AND SOCIAL HISTORY:  The patient grew up in [place].  She completed her education with a bachelor's degree in mathematics at [employer].  She lives with her  and the younger 2 of their 3 sons.  She previously worked in business, primarily accounting.     She does not use alcohol, tobacco or illicit recreational drugs        REVIEW OF SYSTEMS:  The patient denied history of attention and memory disturbance, difficulties with comprehension and expression, difficulty in solving problems, weakness, tremors or other abnormal involuntary movements, numbness or tingling, incoordination, falling or difficulty in walking, urinary or fecal incontinence, headache and other pain, except as described above.  The patient denied any history of severe depression or suicidal ideation, severe anxiety, panic attacks, hallucinations, delusions, psychosis, substance abuse, or psychiatric hospitalization.  She denied rashes and easy bruising.  The remainder of a 14-system symptom review was negative except as noted above.      MEDICATIONS: Keppra and 2000 mg BID, lamotrigine 200 mg BID. Rest  the medications as per medical records.      PHYSICAL EXAMINATION:    On examination, the patient was an alert woman in no apparent distress.  Vitals signs were as per the electronic medical record.  Skull was normocephalic and atraumatic.  Neck was supple, without signs of meningeal irritation.       On neurological examination the patient appeared alert and was fully oriented to person, place, time, and reason for visit.  Speech showed normal articulation, fluency, repetitions, naming, syntax and comprehension.  She accurately repeated digits sequences, repeating 8 forward and 5 reversed.  At 10 minutes from presentation, 4 of 4 phrases were recalled.  No apraxias or atavistic signs were elicited.  Fundoscopy was normal bilaterally.  Cranial nerves III through XII were normal.  Muscle masses, tones and strengths were normal throughout.  There was no pronator drift.  Sequential fine finger movements were performed normally with each hand.  No spontaneous tremors, myoclonus, or other abnormal movements were observed.  Sensations of light touch, pin prick, vibration and proprioception were reportedly normal throughout.  The rapid alternating movements, and finger-nose-finger and heel-shin maneuvers were performed normally bilaterally.  Deep tendon reflexes were normal and symmetric throughout.  Toes were downgoing bilaterally.  Romberg maneuver was negative.  Regular walking was stable while using a cane in the right hand.       IMPRESSION:    The patient had not had seizures for longer than a year, on the current AED regimen, until a high level of physiological stress n October 2022.       She had a grand mal seizure in October 2022, shortly after her Hip Replacement Surgery.  At the Time the Patient Was on ketorolac and oxycodone which was weaned off.  The patient did also received anesthesia during her surgery which could decrease the patient's seizure threshold which could explain the breakthrough seizures the  patient had.  Our current plan is to stay on Keppra 2 g twice daily and increase the lamotrigine dose.  We will obtain a Keppra level and lamotrigine level today and follow-up with the patient with a video visit in 1 month.    Patient's last levetiracetam level was 45 mcg/ml, and a lamotrigine level was 6.0 mcg/ml, on 2/23/2022.    She appeared to clearly understand that she is prohibited from operating a motor vehicle within 3 months following any seizure or other episode with sudden unconsciousness or inability to sit up, and that she is required to report any future such seizure to the [facility] within 30 days after the event.  The most recent grand mal seizure occurred on October 29, 2022, and she hs not been driving since then.     PLAN:    1.  Continue levetiracetam at 2 g twice daily  2.  Increase lamotrigine to 200/300 mg daily.   3.  Return for video visit 1 month  4.  Obtain levetiracetam levels and lamotrigine levels today.    Patient was seen and staffed with Dr. Eduardo.      Nazario Kramer MD  Neurology Resident PGY-2  [facility]     Report Prepared By: Nazario Kramer MD, Neurology Resident   I agree with the findings and plan of care as documented.  I personally examined the patient, and discussed our epilepsy diagnostic impressions and therapeutic recommendations with the patient.  The patient was agreeable to this plan.    I spent 48 minutes in this patient care, with 33 minutes in direct patient contact, and 15 minutes in chart review.   Yassine Eduardo M.D., Professor of Neurology

## 2022-12-06 NOTE — LETTER
12/6/2022       RE: Magaly Renee  [address] [place] [address]     Dear Colleague,    Thank you for referring your patient, Magaly Renee, to the [facility] [place] at [facility]. Please see a copy of my visit note below.      [facility] Epilepsy Clinic:  RETURN VISIT           Service Date: 12/06/2022     HISTORY:  Ms. Magaly Renee is a 62-year-old, right-handed woman who returned for follow-up of idiopathic generalized epilepsy with absence seizures and generalized tonic-clonic seizures.     Patient had a hip replacement surgery on 10/27/2022.  After which she was sent home on tapering dose of ketorolac and oxycodone.  After her surgery the next day morning the patient noticed that she was having absence seizure's which was witnessed by the patient's .  Both of them are not clear how many events she had and the patient endorsed that she was confused and did not come back to baseline in between a few of these episodes.  On the night of 28 October 2022 the patient's  endorsed that the patient had a grand mal seizure which lasted around 20 seconds.  The patient did not come into the hospital or inform [facility] clinic regarding this grand mal episode.  The patient was requested to decrease Keppra dose 2000 mg in the morning and 2000 mg at night and Lamictal increased to 200 mg in the morning and 3 mg at night, which the patient did not follow as she was getting ready to get a hip replacement.  Hence the patient has stayed on 2 g twice daily of Keppra and 200 mg twice daily of Lamictal.     The most recent grand mal seizure occurred on October 29, 2022.     She denied adverse effects of the AEDs, although she is tired at times during the day.       She has not had any recent migraine headaches.       She denied adverse effects of the AEDs.       Ictal semiology-history:   The patient reported that she  only ever had 2 types of seizures in her lifetime, as grand mal seizures and absence seizures.      The grand mal seizures first occurred at 14 years of age.  She thinks that she probably had 8-12 total grand mal seizures widely spread out over the years, usually with 3-year intervals or longer between seizures.  These could arise from sleep or waking.  When awake, the patient never had any sort of aura or prodrome, but simply would find herself postictally down on the floor or ground with standing at onset with a severe headache, confusion and occasionally evidence of tongue biting or urinary incontinence.  Her  saw several of these events that arose from sleep in bed at night, described as rigid trunk and leg extension with arm flexion up to the chest and rhythmic generalized jerking with complete unresponsiveness, lasting about 1 minute.  He often noted confused postictal behaviors for 15 minutes or longer after a seizure.      The absence seizures probably began sometime in the early teens.  She often had clusters with multiple absences on the same day, but with many days or even months between seizures, as recently as a decade ago.  The episodes since have declined in frequency and the last of these was witnessed, perhaps 5 years ago.  The patient herself would usually have no idea that one had occurred.  She never had any aura or postictal state with these.  Witnesses would simply note that she suddenly stopped talking and stopped moving, with no response to voice or touch, but with eyes open.  She would not fall down or change posture during these brief seizures.  Her  thought that these consistently lasted less than 20 seconds, and he did not witness any postictal state.      The patient denied that she has ever had a paroxysmal episode of sudden brief confusion, non-convulsive falling with unconsciousness, repetitive involuntary movements or posturing, or other paroxysmal phenomena.  She  denied any history of convulsive status epilepticus, or complex partial status epilepticus.  She denied any history of sudden involuntary jerks while fully awake, but she has had hypnic jerks.       The patient does not recognize exacerbating or remitting factors with regard to seizure frequency.      Epilepsy-seizure predispositions:   The patient has no family history of epilepsy or seizures.  She has no history of gestational or  injury, febrile convulsions, developmental delay, stroke, meningitis, encephalitis, significant head injury, or other epileptic predispositions.  She denied a history of physical or sexual abuse by an adult during her childhood or adulthood.       Laboratory evaluations:   Scanned records from [facility] indicate that a normal brain MRI was obtained on 2001.    Multiple interictal EEG recordings were interpreted as showing generalized spike wave discharges, but sometimes also as showing left parietal or other left-sided focal spike-wave complexes, with the earliest EEG report dating to 2001 at [facility].       The patient had a prolonged outpatient video EEG on 2018, at the [facility], which showed a burst of approximately 3-per-second spike-wave discharges which was atypical due to variable absence of spikes in the presence of high amplitude slow waves and which also showed a single focal left centroparietotemporal spike-wave complex.      A brain MRI detected no cerebral abnormalities, but showed focal enlargement of the left optic chiasm without associated enhancement, [facility].     Epilepsy therapeutics:   The patient remembers that initially after her first grand mal seizure, she was started directly on phenytoin and phenobarbital, which she found highly sedating.  Over the years she was converted to various combinations of clonazepam, carbamazepine, lamotrigine and levetiracetam.  For the last approximately 10 years, she has been  on a combination of levetiracetam, lamotrigine and carbamazepine with intermittent periods of blurry or double vision lasting several hours.  These improved when she began to take her medications with meals, but she still has a severe episode at least once per month.      Other history:   The patient began having migraine headaches in her 30S, which she usually were left retro-orbital or right retro-orbital in alternation.  These were not associated with nausea, vomiting, photophobia or phonophobia.  They might last many hours if untreated, but they are markedly aborted with the use of Fioricet.  They have improved over the years and currently are occurring about once per month or less.      PAST MEDICAL-SURGICAL HISTORY:    1.  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     2.  Chronic migraine headaches.   3.  Status post  section.      FAMILY HISTORY:  There is no family history of seizures or epilepsy.  Her mother had recurrent migraine headaches, but there is no other history of neurologic disease in the family.        PERSONAL AND SOCIAL HISTORY:  The patient grew up in [place].  She completed her education with a bachelor's degree in mathematics at [employer].  She lives with her  and the younger 2 of their 3 sons.  She previously worked in business, primarily accounting.     She does not use alcohol, tobacco or illicit recreational drugs        REVIEW OF SYSTEMS:  The patient denied history of attention and memory disturbance, difficulties with comprehension and expression, difficulty in solving problems, weakness, tremors or other abnormal involuntary movements, numbness or tingling, incoordination, falling or difficulty in walking, urinary or fecal incontinence, headache and other pain, except as described above.  The patient denied any history of severe depression or suicidal  ideation, severe anxiety, panic attacks, hallucinations, delusions, psychosis, substance abuse, or psychiatric hospitalization.  She denied rashes and easy bruising.  The remainder of a 14-system symptom review was negative except as noted above.      MEDICATIONS: Keppra and 2000 mg BID, lamotrigine 200 mg BID. Rest the medications as per medical records.      PHYSICAL EXAMINATION:    On examination, the patient was an alert woman in no apparent distress.  Vitals signs were as per the electronic medical record.  Skull was normocephalic and atraumatic.  Neck was supple, without signs of meningeal irritation.       On neurological examination the patient appeared alert and was fully oriented to person, place, time, and reason for visit.  Speech showed normal articulation, fluency, repetitions, naming, syntax and comprehension.  She accurately repeated digits sequences, repeating 8 forward and 5 reversed.  At 10 minutes from presentation, 4 of 4 phrases were recalled.  No apraxias or atavistic signs were elicited.  Fundoscopy was normal bilaterally.  Cranial nerves III through XII were normal.  Muscle masses, tones and strengths were normal throughout.  There was no pronator drift.  Sequential fine finger movements were performed normally with each hand.  No spontaneous tremors, myoclonus, or other abnormal movements were observed.  Sensations of light touch, pin prick, vibration and proprioception were reportedly normal throughout.  The rapid alternating movements, and finger-nose-finger and heel-shin maneuvers were performed normally bilaterally.  Deep tendon reflexes were normal and symmetric throughout.  Toes were downgoing bilaterally.  Romberg maneuver was negative.  Regular walking was stable while using a cane in the right hand.       IMPRESSION:    The patient had not had seizures for longer than a year, on the current AED regimen, until a high level of physiological stress n October 2022.       She had a  grand mal seizure in October 2022, shortly after her Hip Replacement Surgery.  At the Time the Patient Was on ketorolac and oxycodone which was weaned off.  The patient did also received anesthesia during her surgery which could decrease the patient's seizure threshold which could explain the breakthrough seizures the patient had.  Our current plan is to stay on Keppra 2 g twice daily and increase the lamotrigine dose.  We will obtain a Keppra level and lamotrigine level today and follow-up with the patient with a video visit in 1 month.    Patient's last levetiracetam level was 45 mcg/ml, and a lamotrigine level was 6.0 mcg/ml, on 2/23/2022.    She appeared to clearly understand that she is prohibited from operating a motor vehicle within 3 months following any seizure or other episode with sudden unconsciousness or inability to sit up, and that she is required to report any future such seizure to the [facility] within 30 days after the event.  The most recent grand mal seizure occurred on October 29, 2022, and she hs not been driving since then.     PLAN:    1.  Continue levetiracetam at 2 g twice daily  2.  Increase lamotrigine to 200/300 mg daily.   3.  Return for video visit 1 month  4.  Obtain levetiracetam levels and lamotrigine levels today.    Patient was seen and staffed with Dr. Eduardo.      Nazario Kramer MD  Neurology Resident PGY-2  [facility]     Report Prepared By: Nazario Kramer MD, Neurology Resident   I agree with the findings and plan of care as documented.  I personally examined the patient, and discussed our epilepsy diagnostic impressions and therapeutic recommendations with the patient.  The patient was agreeable to this plan.    I spent 48 minutes in this patient care, with 33 minutes in direct patient contact, and 15 minutes in chart review.     Yassine Eduardo M.D., Professor of Neurology

## 2022-12-07 LAB — LAMOTRIGINE SERPL-MCNC: 4.5 UG/ML

## 2022-12-09 ENCOUNTER — TRANSFERRED RECORDS (OUTPATIENT)
Dept: HEALTH INFORMATION MANAGEMENT | Facility: CLINIC | Age: 62
End: 2022-12-09

## 2023-01-30 ENCOUNTER — VIRTUAL VISIT (OUTPATIENT)
Dept: NEUROLOGY | Facility: CLINIC | Age: 63
End: 2023-01-30
Payer: COMMERCIAL

## 2023-01-30 DIAGNOSIS — G40.319 INTRACTABLE GENERALIZED EPILEPSY (H): ICD-10-CM

## 2023-01-30 PROCEDURE — 99213 OFFICE O/P EST LOW 20 MIN: CPT | Mod: 95 | Performed by: PSYCHIATRY & NEUROLOGY

## 2023-01-30 RX ORDER — LAMOTRIGINE 100 MG/1
200 TABLET ORAL 2 TIMES DAILY
Qty: 360 TABLET | Refills: 3 | Status: SHIPPED | OUTPATIENT
Start: 2023-01-30 | End: 2023-01-30

## 2023-01-30 RX ORDER — LAMOTRIGINE 100 MG/1
200 TABLET ORAL 2 TIMES DAILY
Qty: 360 TABLET | Refills: 3 | Status: SHIPPED | OUTPATIENT
Start: 2023-01-30 | End: 2023-08-07

## 2023-01-30 NOTE — LETTER
2023       RE: Magaly Renee  [address] [place] [address]     Dear Colleague,    Thank you for referring your patient, Magaly Renee, to the [facility] NEUROLOGY CLINIC [place] at [facility]. Please see a copy of my visit note below.     EPILEPSY CLINIC VIDEO VISIT NOTE  The scheduled clinic visit was changed to a video visit to reduce the risk of COVID-19 transmission.            Service Date: 2023     HISTORY: I spoke with Ms. Magaly Renee and her .  She is a 62-year-old, right-handed woman who returned for follow-up of idiopathic generalized epilepsy with absence seizures and generalized tonic-clonic seizures.       The patient denied having recent fever or cough, and exposure to anyone thought to have COVID-19.      Following the most recent visit to this clinic on 2022, the patient reportedly has had no seizures.       The most recent grand mal seizure occurred on 10/28/2022, following right hip arthroplasty on 10/27/2022.  The most recent absence seizure occurred approximately in .       She decided nt to adjust doses of the AEDs following the most recent GTC seizure, because she was traveling to see her sister and was concerned that sleep cycle changes together with AED dose changes might promote seizure occurrence.      She denied adverse effects of the AEDs, although she is tired at times during the day.       She has not had any recent migraine headaches.  She occasionally has mild aching headaches that rapidly resolve with one dose of an OTC analgesic.       PAST MEDICAL-SURGICAL HISTORY:    1.  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     2.  Chronic migraine headaches.   3.  Status post  section.   4.  Status post right hip arthroplasty.      MEDICATIONS:  Levetiracetam 2000 mg  b.i.d., lamotrigine 200 mg b.i.d., and other medications as per the electronic medical record.       VIDEO OBSERVATIONS:   The patient spoke with normal articulation, fluency and syntax, with normal comprehension of questions.    On command, the patient moved the direction of gaze into all 4 quadrants conjugately and without nystagmus.   Facial movements were normal.    Tongue protruded on the midline.    The patient did not display any resting tremors or action tremors of the outstretched arms.  Limb movements were normal.       IMPRESSION:   The patient has not had seizures following an early postoperative GTC seizure in October 2022, following hip arthroplasty, while on the current AED regimen.       We previously discussed the possibility of gradually converting to lamotrigine monotherapy.  She now is seizure-free and has not had adverse AED effects.  She decided not to change the AED doses while travelling, and she may not want to make changes at all.  We will discuss this again, when she returns to clinic at the next visit.       She appeared to clearly understand that she is prohibited from operating a motor vehicle within 3 months following any seizure or other episode with sudden unconsciousness or inability to sit up, and that she is required to report any future such seizure to the [facility] within 30 days after the event.       PLAN:   1)  Continue levetiracetam and lamotrigine at the current doses.   2)  Return in 6 months for in-person visit.     Video Conference via [employer].   Video Start Time: 4:48 p.m.   Video Stop Time: 5:02 p.m.   Provider location: [facility]   Reported Patient Location: Home      I spent 26 minutes in this patient care, with 14 minutes in direct patient contact, and 12 minutes in chart review and document preparation on the day of the visit.     Yassine Eduardo M.D., Professor of Neurology

## 2023-01-30 NOTE — PROGRESS NOTES
Karen is a 62 year old who is being evaluated via a billable video visit.       How would you like to obtain your AVS? MyChart  If the video visit is dropped, the invitation should be resent by: Send to e-mail at: [email or web address]       EPILEPSY CLINIC VIDEO VISIT NOTE  The scheduled clinic visit was changed to a video visit to reduce the risk of COVID-19 transmission.            Service Date: 2023     HISTORY: I spoke with Ms. Magaly Renee and her .  She is a 62-year-old, right-handed woman who returned for follow-up of idiopathic generalized epilepsy with absence seizures and generalized tonic-clonic seizures.       The patient denied having recent fever or cough, and exposure to anyone thought to have COVID-19.      Following the most recent visit to this clinic on 2022, the patient reportedly has had no seizures.       The most recent grand mal seizure occurred on 10/28/2022, following right hip arthroplasty on 10/27/2022.  The most recent absence seizure occurred approximately in .       She decided nt to adjust doses of the AEDs following the most recent GTC seizure, because she was traveling to see her sister and was concerned that sleep cycle changes together with AED dose changes might promote seizure occurrence.      She denied adverse effects of the AEDs, although she is tired at times during the day.       She has not had any recent migraine headaches.  She occasionally has mild aching headaches that rapidly resolve with one dose of an OTC analgesic.       PAST MEDICAL-SURGICAL HISTORY:    1.  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     2.  Chronic migraine headaches.   3.  Status post  section.   4.  Status post right hip arthroplasty.      MEDICATIONS:  Levetiracetam 2000 mg b.i.d., lamotrigine 200 mg b.i.d., and other medications as per the electronic  medical record.       VIDEO OBSERVATIONS:   The patient spoke with normal articulation, fluency and syntax, with normal comprehension of questions.    On command, the patient moved the direction of gaze into all 4 quadrants conjugately and without nystagmus.   Facial movements were normal.    Tongue protruded on the midline.    The patient did not display any resting tremors or action tremors of the outstretched arms.  Limb movements were normal.       IMPRESSION:   The patient has not had seizures following an early postoperative GTC seizure in October 2022, following hip arthroplasty, while on the current AED regimen.       We previously discussed the possibility of gradually converting to lamotrigine monotherapy.  She now is seizure-free and has not had adverse AED effects.  She decided not to change the AED doses while travelling, and she may not want to make changes at all.  We will discuss this again, when she returns to clinic at the next visit.       She appeared to clearly understand that she is prohibited from operating a motor vehicle within 3 months following any seizure or other episode with sudden unconsciousness or inability to sit up, and that she is required to report any future such seizure to the [facility] within 30 days after the event.       PLAN:   1)  Continue levetiracetam and lamotrigine at the current doses.   2)  Return in 6 months for in-person visit.     Video Conference via [employer].   Video Start Time: 4:48 p.m.   Video Stop Time: 5:02 p.m.   Provider location: [facility]   Reported Patient Location: Home      I spent 26 minutes in this patient care, with 14 minutes in direct patient contact, and 12 minutes in chart review and document preparation on the day of the visit.     Yassine Eduardo M.D., Professor of Neurology

## 2023-02-27 DIAGNOSIS — G43.009 MIGRAINE WITHOUT AURA AND WITHOUT STATUS MIGRAINOSUS, NOT INTRACTABLE: ICD-10-CM

## 2023-02-28 RX ORDER — BUTALBITAL, ACETAMINOPHEN AND CAFFEINE 50; 325; 40 MG/1; MG/1; MG/1
1 TABLET ORAL EVERY 4 HOURS PRN
Qty: 12 TABLET | Refills: 0 | Status: SHIPPED | OUTPATIENT
Start: 2023-02-28 | End: 2023-04-18

## 2023-04-16 ENCOUNTER — HEALTH MAINTENANCE LETTER (OUTPATIENT)
Age: 63
End: 2023-04-16

## 2023-04-18 DIAGNOSIS — G43.009 MIGRAINE WITHOUT AURA AND WITHOUT STATUS MIGRAINOSUS, NOT INTRACTABLE: ICD-10-CM

## 2023-04-18 RX ORDER — BUTALBITAL, ACETAMINOPHEN AND CAFFEINE 50; 325; 40 MG/1; MG/1; MG/1
1 TABLET ORAL EVERY 4 HOURS PRN
Qty: 12 TABLET | Refills: 0 | Status: SHIPPED | OUTPATIENT
Start: 2023-04-18 | End: 2023-06-05

## 2023-05-16 DIAGNOSIS — Z98.818 OTHER DENTAL PROCEDURE STATUS: Primary | ICD-10-CM

## 2023-05-16 NOTE — TELEPHONE ENCOUNTER
I just want to double check. She had hip replacement in October, we have not prescribed the antibiotics since her hip replacement or in the past for this. Her surgeon's notes only recommend wait 3 months after surgery before dental appointment. Dental/orthopedic surgeon association guidelines recommend against prophylactic antibiotics as they are not necessary. Did her surgeon or dentist specifically recommend she take this?  Mesha Arguelles PA-C

## 2023-05-16 NOTE — TELEPHONE ENCOUNTER
Called patient and reviewed the message from ANMOL Arguelles.   She thought that she had received the fill of the amoxicillin from ANMOL Arguelles in the past. She is unsure who told her she needs prophylactic antibiotics but someone did. She is not sure if it was only for the 1st dental appointment but she believes it was suppose to be lifetime. She is going to start with contacting her dentist to see if it came from them and then she will contact her surgeon.  Trina Albrecht RN on 5/16/2023 at 4:04 PM

## 2023-05-16 NOTE — TELEPHONE ENCOUNTER
Received call from Patient  States that she has had hip replacement and needs ABX for dental procedures.  Patient has a dentist appointment coming up on 5/22/23 and needs ABX ordered.  Patient not sure which ABX needs to be ordered but states that she has filled it before.  Yuan Weinberg RN      Renal insufficiency

## 2023-05-17 RX ORDER — AMOXICILLIN 500 MG/1
2000 CAPSULE ORAL ONCE
Qty: 4 CAPSULE | Refills: 0 | Status: SHIPPED | OUTPATIENT
Start: 2023-05-17 | End: 2023-05-17

## 2023-06-26 ENCOUNTER — PATIENT OUTREACH (OUTPATIENT)
Dept: CARE COORDINATION | Facility: CLINIC | Age: 63
End: 2023-06-26
Payer: COMMERCIAL

## 2023-07-10 ENCOUNTER — PATIENT OUTREACH (OUTPATIENT)
Dept: CARE COORDINATION | Facility: CLINIC | Age: 63
End: 2023-07-10
Payer: COMMERCIAL

## 2023-08-07 ENCOUNTER — OFFICE VISIT (OUTPATIENT)
Dept: NEUROLOGY | Facility: CLINIC | Age: 63
End: 2023-08-07
Payer: COMMERCIAL

## 2023-08-07 VITALS
HEART RATE: 56 BPM | WEIGHT: 160 LBS | BODY MASS INDEX: 26.22 KG/M2 | TEMPERATURE: 98.5 F | SYSTOLIC BLOOD PRESSURE: 98 MMHG | OXYGEN SATURATION: 98 % | DIASTOLIC BLOOD PRESSURE: 60 MMHG | RESPIRATION RATE: 16 BRPM

## 2023-08-07 DIAGNOSIS — G40.319 INTRACTABLE GENERALIZED EPILEPSY (H): ICD-10-CM

## 2023-08-07 DIAGNOSIS — G40.409 OTHER GENERALIZED EPILEPSY, NOT INTRACTABLE, WITHOUT STATUS EPILEPTICUS (H): ICD-10-CM

## 2023-08-07 PROCEDURE — 99214 OFFICE O/P EST MOD 30 MIN: CPT | Mod: GC | Performed by: PSYCHIATRY & NEUROLOGY

## 2023-08-07 RX ORDER — LAMOTRIGINE 100 MG/1
TABLET ORAL
Qty: 450 TABLET | Refills: 3 | Status: SHIPPED | OUTPATIENT
Start: 2023-08-07 | End: 2024-08-22

## 2023-08-07 RX ORDER — LEVETIRACETAM 1000 MG/1
2000 TABLET ORAL 2 TIMES DAILY
Qty: 120 TABLET | Refills: 11 | Status: SHIPPED | OUTPATIENT
Start: 2023-08-07 | End: 2024-06-27

## 2023-08-07 NOTE — LETTER
8/7/2023       RE: Magaly Renee  [address] [place] [address]     Dear Colleague,    Thank you for referring your patient, Magaly Renee, to the [facility] [place] at [facility]. Please see a copy of my visit note below.      EPILEPSY CLINIC VISIT NOTE         Service Date: 08/07/2023     HISTORY: I spoke with Ms. Magaly Renee and her .  She is a 62-year-old, right-handed woman who returned for follow-up of idiopathic generalized epilepsy with absence seizures and generalized tonic-clonic seizures.       Following the most recent visit on 11/30/2023, the patient reportedly has had no seizures.       The most recent grand mal seizure occurred on 10/28/2022, following right hip arthroplasty on 10/27/2022.  The most recent absence seizure occurred approximately in 2013.       She decided nt to adjust doses of the AEDs following the most recent GTC seizure, because she was traveling to see her sister and was concerned that sleep cycle changes together with AED dose changes might promote seizure occurrence. Patient reports to increasing keppra dose to 3 grams of keppra twice a day since last visit. Patient reports she is unsure when she increased dose but doesn't want to change dose in fear of promoting breakthrough seizure. Denies missing doses. Denies using a pill box    She denied adverse effects of the AEDs. Denies mood changes such as outbursts or irritation, gait imbalance, falls, or nausea.    Ictal semiology-history:   The patient reported that she only ever had 2 types of seizures in her lifetime, as grand mal seizures and absence seizures.      The grand mal seizures first occurred at 14 years of age.  She thinks that she probably had 8-12 total grand mal seizures widely spread out over the years, usually with 3-year intervals or longer between seizures.  These could arise from sleep or waking.  When awake,  the patient never had any sort of aura or prodrome, but simply would find herself postictally down on the floor or ground with standing at onset with a severe headache, confusion and occasionally evidence of tongue biting or urinary incontinence.  Her  saw several of these events that arose from sleep in bed at night, described as rigid trunk and leg extension with arm flexion up to the chest and rhythmic generalized jerking with complete unresponsiveness, lasting about 1 minute.  He often noted confused postictal behaviors for 15 minutes or longer after a seizure.      The absence seizures probably began sometime in the early teens.  She often had clusters with multiple absences on the same day, but with many days or even months between seizures, as recently as a decade ago.  The episodes since have declined in frequency and the last of these was witnessed, perhaps 5 years ago.  The patient herself would usually have no idea that one had occurred.  She never had any aura or postictal state with these.  Witnesses would simply note that she suddenly stopped talking and stopped moving, with no response to voice or touch, but with eyes open.  She would not fall down or change posture during these brief seizures.  Her  thought that these consistently lasted less than 20 seconds, and he did not witness any postictal state.     The patient denied that she has ever had a paroxysmal episode of sudden brief confusion, non-convulsive falling with unconsciousness, repetitive involuntary movements or posturing, or other paroxysmal phenomena.  She denied any history of convulsive status epilepticus, or complex partial status epilepticus.  She denied any history of sudden involuntary jerks while fully awake, but she has had hypnic jerks.       The patient does not recognize exacerbating or remitting factors with regard to seizure frequency.      Epilepsy-seizure predispositions:   The patient has no family history  of epilepsy or seizures.  She has no history of gestational or  injury, febrile convulsions, developmental delay, stroke, meningitis, encephalitis, significant head injury, or other epileptic predispositions.  She denied a history of physical or sexual abuse by an adult during her childhood or adulthood.     Laboratory evaluations:   Scanned records from [facility] indicate that a normal brain MRI was obtained on 2001.    Multiple interictal EEG recordings were interpreted as showing generalized spike wave discharges, but sometimes also as showing left parietal or other left-sided focal spike-wave complexes, with the earliest EEG report dating to 2001 at [facility].       The patient had a prolonged outpatient video EEG on 2018, at the [facility], which showed a burst of approximately 3-per-second spike-wave discharges which was atypical due to variable absence of spikes in the presence of high amplitude slow waves and which also showed a single focal left centroparietotemporal spike-wave complex.      A brain MRI detected no cerebral abnormalities, but showed focal enlargement of the left optic chiasm without associated enhancement[facility]    2022 keppra level 41.6 and lamotrigine level of 4.5.     Epilepsy therapeutics:   The patient remembers that initially after her first grand mal seizure, she was started directly on phenytoin and phenobarbital, which she found highly sedating.  Over the years she was converted to various combinations of clonazepam, carbamazepine, lamotrigine and levetiracetam.  For the last approximately 10 years, she has been on a combination of levetiracetam, lamotrigine and carbamazepine with intermittent periods of blurry or double vision lasting several hours.  These improved when she began to take her medications with meals.      PAST MEDICAL-SURGICAL HISTORY:    1.  History of idiopathic generalized epilepsy with absence  seizures and generalized tonic-clonic seizures with atypical interictal EEG abnormalities.     2.  Chronic migraine headaches.   3.  Status post  section.   4.  Status post right hip arthroplasty.      MEDICATIONS:  Levetiracetam 2000 mg b.i.d., lamotrigine 200 mg b.i.d., and other medications as per the electronic medical record.       On examination, the patient was an alert woman in no apparent distress.  Vitals signs were as per the electronic medical record.  Skull was normocephalic and atraumatic.  Neck was supple, without signs of meningeal irritation.       On neurological examination the patient appeared alert and was fully oriented to person, place, time, and reason for visit.  Speech showed normal articulation, fluency, repetitions, naming, syntax and comprehension. Cranial nerves III through XII were normal.  Muscle masses, tones and strengths were normal throughout.  There was no pronator drift. No spontaneous tremors, myoclonus, or other abnormal movements were observed.  Sensations of light touch were reportedly normal throughout.  The finger-nose-finger and heel-shin maneuvers were performed normally bilaterally.  Romberg maneuver was negative.  Regular walking was stable.     IMPRESSION:   The patient has not had seizures following an early postoperative GTC seizure in 2022, following hip arthroplasty, while on the current AED regimen.       \She now is seizure-free and has not had adverse AED effects. Since last visit patient unclear when she increased keppra dose to 3000 mg twice a day but continues to take 200 mg of lamotrigine BID. Patient hesitant to reduce keppra dose to 2000 mg BID in fear of causing breakthrough grand mal seizures. After discussion and educating patient that the lamotrigine is the AED preventing grand mal seizures vs keppra. Patient was agreeable to reducing keppra dose to 2000 mg BID and increasing the lamotrigine bedtime dose to 300 mg and continue with 200  mg in the morning. Plan to check AED levels at next visit. Follow up virtual visit in 6 months.     She appeared to clearly understand that she is prohibited from operating a motor vehicle within 3 months following any seizure or other episode with sudden unconsciousness or inability to sit up, and that she is required to report any future such seizure to the DPS within 30 days after the event.       PLAN:   1)  Decrease back to prescribed levetiracetam dose of 2000 mg BID  2)  Increase lamotrigine to 2 tablets (200 mg) in the AM and 3 tablets (300 mg) in the PM.  2)  Return in 6 months for virtual visit.     This patient was discussed with Dr. Eduardo.     Juanis Sands MD  Neurology Resident PGY-2     Report Prepared By: Juanis Sands MD, Neurology Resident   I agree with the findings and plan of care as documented.  I personally examined the patient, and discussed our epilepsy diagnostic impressions and therapeutic recommendations with the patient.  The patient was agreeable to this plan.    I spent 34 minutes in this patient care, with 22 minutes in direct patient contact, and 12 minutes in chart review.       Again, thank you for allowing me to participate in the care of your patient.      Sincerely,    Yassine Eduardo MD

## 2023-08-07 NOTE — PROGRESS NOTES
EPILEPSY CLINIC VISIT NOTE         Service Date: 08/07/2023     HISTORY: I spoke with Ms. Magaly Renee and her .  She is a 62-year-old, right-handed woman who returned for follow-up of idiopathic generalized epilepsy with absence seizures and generalized tonic-clonic seizures.       Following the most recent visit on 11/30/2023, the patient reportedly has had no seizures.       The most recent grand mal seizure occurred on 10/28/2022, following right hip arthroplasty on 10/27/2022.  The most recent absence seizure occurred approximately in 2013.       She decided nt to adjust doses of the AEDs following the most recent GTC seizure, because she was traveling to see her sister and was concerned that sleep cycle changes together with AED dose changes might promote seizure occurrence. Patient reports to increasing keppra dose to 3 grams of keppra twice a day since last visit. Patient reports she is unsure when she increased dose but doesn't want to change dose in fear of promoting breakthrough seizure. Denies missing doses. Denies using a pill box    She denied adverse effects of the AEDs. Denies mood changes such as outbursts or irritation, gait imbalance, falls, or nausea.    Ictal semiology-history:   The patient reported that she only ever had 2 types of seizures in her lifetime, as grand mal seizures and absence seizures.      The grand mal seizures first occurred at 14 years of age.  She thinks that she probably had 8-12 total grand mal seizures widely spread out over the years, usually with 3-year intervals or longer between seizures.  These could arise from sleep or waking.  When awake, the patient never had any sort of aura or prodrome, but simply would find herself postictally down on the floor or ground with standing at onset with a severe headache, confusion and occasionally evidence of tongue biting or urinary incontinence.  Her  saw several of these events that arose from sleep in bed at  night, described as rigid trunk and leg extension with arm flexion up to the chest and rhythmic generalized jerking with complete unresponsiveness, lasting about 1 minute.  He often noted confused postictal behaviors for 15 minutes or longer after a seizure.      The absence seizures probably began sometime in the early teens.  She often had clusters with multiple absences on the same day, but with many days or even months between seizures, as recently as a decade ago.  The episodes since have declined in frequency and the last of these was witnessed, perhaps 5 years ago.  The patient herself would usually have no idea that one had occurred.  She never had any aura or postictal state with these.  Witnesses would simply note that she suddenly stopped talking and stopped moving, with no response to voice or touch, but with eyes open.  She would not fall down or change posture during these brief seizures.  Her  thought that these consistently lasted less than 20 seconds, and he did not witness any postictal state.     The patient denied that she has ever had a paroxysmal episode of sudden brief confusion, non-convulsive falling with unconsciousness, repetitive involuntary movements or posturing, or other paroxysmal phenomena.  She denied any history of convulsive status epilepticus, or complex partial status epilepticus.  She denied any history of sudden involuntary jerks while fully awake, but she has had hypnic jerks.       The patient does not recognize exacerbating or remitting factors with regard to seizure frequency.      Epilepsy-seizure predispositions:   The patient has no family history of epilepsy or seizures.  She has no history of gestational or  injury, febrile convulsions, developmental delay, stroke, meningitis, encephalitis, significant head injury, or other epileptic predispositions.  She denied a history of physical or sexual abuse by an adult during her childhood or adulthood.      Laboratory evaluations:   Scanned records from [facility] indicate that a normal brain MRI was obtained on 2001.    Multiple interictal EEG recordings were interpreted as showing generalized spike wave discharges, but sometimes also as showing left parietal or other left-sided focal spike-wave complexes, with the earliest EEG report dating to 2001 at [facility].       The patient had a prolonged outpatient video EEG on 2018, at the [facility], which showed a burst of approximately 3-per-second spike-wave discharges which was atypical due to variable absence of spikes in the presence of high amplitude slow waves and which also showed a single focal left centroparietotemporal spike-wave complex.      A brain MRI detected no cerebral abnormalities, but showed focal enlargement of the left optic chiasm without associated enhancement[facility]    2022 keppra level 41.6 and lamotrigine level of 4.5.     Epilepsy therapeutics:   The patient remembers that initially after her first grand mal seizure, she was started directly on phenytoin and phenobarbital, which she found highly sedating.  Over the years she was converted to various combinations of clonazepam, carbamazepine, lamotrigine and levetiracetam.  For the last approximately 10 years, she has been on a combination of levetiracetam, lamotrigine and carbamazepine with intermittent periods of blurry or double vision lasting several hours.  These improved when she began to take her medications with meals.      PAST MEDICAL-SURGICAL HISTORY:    1.  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     2.  Chronic migraine headaches.   3.  Status post  section.   4.  Status post right hip arthroplasty.      MEDICATIONS:  Levetiracetam 2000 mg b.i.d., lamotrigine 200 mg b.i.d., and other medications as per the  electronic medical record.       On examination, the patient was an alert woman in no apparent distress.  Vitals signs were as per the electronic medical record.  Skull was normocephalic and atraumatic.  Neck was supple, without signs of meningeal irritation.       On neurological examination the patient appeared alert and was fully oriented to person, place, time, and reason for visit.  Speech showed normal articulation, fluency, repetitions, naming, syntax and comprehension. Cranial nerves III through XII were normal.  Muscle masses, tones and strengths were normal throughout.  There was no pronator drift. No spontaneous tremors, myoclonus, or other abnormal movements were observed.  Sensations of light touch were reportedly normal throughout.  The finger-nose-finger and heel-shin maneuvers were performed normally bilaterally.  Romberg maneuver was negative.  Regular walking was stable.     IMPRESSION:   The patient has not had seizures following an early postoperative GTC seizure in October 2022, following hip arthroplasty, while on the current AED regimen.       \She now is seizure-free and has not had adverse AED effects. Since last visit patient unclear when she increased keppra dose to 3000 mg twice a day but continues to take 200 mg of lamotrigine BID. Patient hesitant to reduce keppra dose to 2000 mg BID in fear of causing breakthrough grand mal seizures. After discussion and educating patient that the lamotrigine is the AED preventing grand mal seizures vs keppra. Patient was agreeable to reducing keppra dose to 2000 mg BID and increasing the lamotrigine bedtime dose to 300 mg and continue with 200 mg in the morning. Plan to check AED levels at next visit. Follow up virtual visit in 6 months.     She appeared to clearly understand that she is prohibited from operating a motor vehicle within 3 months following any seizure or other episode with sudden unconsciousness or inability to sit up, and that she is  required to report any future such seizure to the DPS within 30 days after the event.       PLAN:   1)  Decrease back to prescribed levetiracetam dose of 2000 mg BID  2)  Increase lamotrigine to 2 tablets (200 mg) in the AM and 3 tablets (300 mg) in the PM.  2)  Return in 6 months for virtual visit.     This patient was discussed with Dr. Eduardo.     Juanis Sands MD  Neurology Resident PGY-2     Report Prepared By: Juanis Sands MD, Neurology Resident   I agree with the findings and plan of care as documented.  I personally examined the patient, and discussed our epilepsy diagnostic impressions and therapeutic recommendations with the patient.  The patient was agreeable to this plan.    I spent 34 minutes in this patient care, with 22 minutes in direct patient contact, and 12 minutes in chart review.   Yassine Eduardo M.D., Professor of Neurology

## 2023-09-16 ENCOUNTER — HEALTH MAINTENANCE LETTER (OUTPATIENT)
Age: 63
End: 2023-09-16

## 2023-10-31 ENCOUNTER — TELEPHONE (OUTPATIENT)
Dept: NEUROLOGY | Facility: CLINIC | Age: 63
End: 2023-10-31

## 2023-10-31 NOTE — TELEPHONE ENCOUNTER
Due to a change in Dr. Eduardo's schedule he will not be available at the time of patient's appt on 2/13/2024. Need to reschedule this appointment.    LVM, sent MyC. If patient calls back please schedule using Dr. Eduardo's MINCEP template, he does not currently have video visit slots at the [facility].    Geronimo Benjaimn on 10/31/2023 at 12:14 PM

## 2023-11-13 ENCOUNTER — TELEPHONE (OUTPATIENT)
Dept: NEUROLOGY | Facility: CLINIC | Age: 63
End: 2023-11-13

## 2024-04-19 DIAGNOSIS — G40.319 INTRACTABLE GENERALIZED EPILEPSY (H): ICD-10-CM

## 2024-04-25 RX ORDER — LAMOTRIGINE 100 MG/1
TABLET ORAL
Qty: 360 TABLET | Refills: 3 | OUTPATIENT
Start: 2024-04-25

## 2024-06-22 ENCOUNTER — HEALTH MAINTENANCE LETTER (OUTPATIENT)
Age: 64
End: 2024-06-22

## 2024-06-25 ENCOUNTER — TELEPHONE (OUTPATIENT)
Dept: NEUROLOGY | Facility: CLINIC | Age: 64
End: 2024-06-25
Payer: COMMERCIAL

## 2024-06-25 DIAGNOSIS — G40.409 OTHER GENERALIZED EPILEPSY, NOT INTRACTABLE, WITHOUT STATUS EPILEPTICUS (H): ICD-10-CM

## 2024-06-25 PROCEDURE — 99207 PR NO CHARGE LOS: CPT | Performed by: PSYCHIATRY & NEUROLOGY

## 2024-06-27 RX ORDER — LEVETIRACETAM 1000 MG/1
2000 TABLET ORAL 2 TIMES DAILY
Qty: 120 TABLET | Refills: 5 | Status: SHIPPED | OUTPATIENT
Start: 2024-06-27 | End: 2024-06-27

## 2024-06-27 RX ORDER — LEVETIRACETAM 1000 MG/1
2000 TABLET ORAL 2 TIMES DAILY
Qty: 120 TABLET | Refills: 1 | Status: SHIPPED | OUTPATIENT
Start: 2024-06-27 | End: 2024-08-22

## 2024-06-27 NOTE — ADDENDUM NOTE
Addended by: ANÍBAL JARQUIN on: 6/27/2024 06:05 PM     Modules accepted: Orders, Level of Service

## 2024-06-27 NOTE — TELEPHONE ENCOUNTER
[facility] Call Center    Phone Message    May a detailed message be left on voicemail: yes     Reason for Call: Medication Refill Request    Has the patient contacted the pharmacy for the refill? Yes   Name of medication being requested: levETIRAcetam (KEPPRA) 1000 MG tablet  Provider who prescribed the medication: Dr Eduardo  Pharmacy: [facility]/PHARMACY [id number] [place] [id number]    Date medication is needed: 06/27/2024     Patient calling requesting refill be sent as soon as possible. Patient states she has one pill left and will need to take 2 more for her full dose this evening.       Action Taken: Message routed to:  [facility] ([facility] Neurology    Travel Screening: Not Applicable

## 2024-06-27 NOTE — TELEPHONE ENCOUNTER
Last seen: 8/7/23  RTC: 6 months  Cancel: no  No-show: no  Next appt: none - will ask patient to schedule    Incoming refill from patient via phones    Medication requested: levETIRAcetam (KEPPRA) 1000 MG tablet   Directions: Take 2 tablets (2,000 mg) by mouth 2 times daily - Oral   Qty: 120 + 5  Last refilled: 8/7/23    Medication refill approved per refill protocol

## 2024-07-10 ENCOUNTER — OFFICE VISIT (OUTPATIENT)
Dept: FAMILY MEDICINE | Facility: CLINIC | Age: 64
End: 2024-07-10
Payer: COMMERCIAL

## 2024-07-10 VITALS
WEIGHT: 163.3 LBS | HEIGHT: 67 IN | TEMPERATURE: 97.5 F | HEART RATE: 68 BPM | DIASTOLIC BLOOD PRESSURE: 80 MMHG | OXYGEN SATURATION: 96 % | BODY MASS INDEX: 25.63 KG/M2 | RESPIRATION RATE: 20 BRPM | SYSTOLIC BLOOD PRESSURE: 102 MMHG

## 2024-07-10 DIAGNOSIS — L08.9 INFECTED CYST OF SKIN: Primary | ICD-10-CM

## 2024-07-10 DIAGNOSIS — L72.9 INFECTED CYST OF SKIN: Primary | ICD-10-CM

## 2024-07-10 PROCEDURE — 99213 OFFICE O/P EST LOW 20 MIN: CPT | Performed by: NURSE PRACTITIONER

## 2024-07-10 RX ORDER — CEPHALEXIN 500 MG/1
500 CAPSULE ORAL 2 TIMES DAILY
Qty: 10 CAPSULE | Refills: 0 | Status: SHIPPED | OUTPATIENT
Start: 2024-07-10 | End: 2024-07-15

## 2024-07-10 ASSESSMENT — PAIN SCALES - GENERAL: PAINLEVEL: NO PAIN (0)

## 2024-07-10 NOTE — PROGRESS NOTES
"  Assessment & Plan     Infected cyst of skin  Treat for infected cyst - warm packs suggested.  Follow-up with Gen Surgery for intervention if not improved.    - cephALEXin (KEFLEX) 500 MG capsule  Dispense: 10 capsule; Refill: 0  - Adult Gen Surg  Referral            BMI  Estimated body mass index is 25.96 kg/m  as calculated from the following:    Height as of this encounter: 1.689 m (5' 6.5\").    Weight as of this encounter: 74.1 kg (163 lb 4.8 oz).         See Patient Instructions    Subjective   Karen is a 63 year old, presenting for the following health issues:  Mass (Upper left back near shoulder blade area - noticed it 2 days . )        7/10/2024     8:33 AM   Additional Questions   Roomed by Maurice SHER CMA   Accompanied by self     History of Present Illness       Reason for visit:  Lump - upper left back    She eats 2-3 servings of fruits and vegetables daily.She consumes 0 sweetened beverage(s) daily.She exercises with enough effort to increase her heart rate 20 to 29 minutes per day.  She exercises with enough effort to increase her heart rate 3 or less days per week.   She is taking medications regularly.         Concern - Lump   Onset: x 2 days   Description: lump on upper left back , noticed it 2 days ago   Intensity: mild  Progression of Symptoms:  same  Accompanying Signs & Symptoms: none   Previous history of similar problem: none   Precipitating factors:        Worsened by: none   Alleviating factors:        Improved by: none   Therapies tried and outcome:  none     Reports size has grown and is tender when touched or put pressure    Review of Systems  Constitutional, HEENT, cardiovascular, pulmonary, GI, , musculoskeletal, neuro, skin, endocrine and psych systems are negative, except as otherwise noted.      Objective    /80 (BP Location: Left arm, Patient Position: Sitting, Cuff Size: Adult Regular)   Pulse 68   Temp 97.5  F (36.4  C) (Tympanic)   Resp 20   Ht 1.689 m (5' 6.5\") "   Wt 74.1 kg (163 lb 4.8 oz)   LMP 07/06/2016 (LMP Unknown)   SpO2 96%   BMI 25.96 kg/m    Body mass index is 25.96 kg/m .  Physical Exam   GENERAL: alert and no distress  MS: no gross musculoskeletal defects noted, no edema  SKIN: no suspicious lesions or rashes and Left upper back with firm, mildly erythematous raised mass.  Able to define borders suggesting lipoma or cyst.  PSYCH: mentation appears normal, affect normal/bright             Signed Electronically by: Bhavani Slaughter DNP

## 2024-07-22 ENCOUNTER — OFFICE VISIT (OUTPATIENT)
Dept: SURGERY | Facility: CLINIC | Age: 64
End: 2024-07-22
Attending: NURSE PRACTITIONER
Payer: COMMERCIAL

## 2024-07-22 ENCOUNTER — TELEPHONE (OUTPATIENT)
Dept: SURGERY | Facility: CLINIC | Age: 64
End: 2024-07-22

## 2024-07-22 VITALS
WEIGHT: 163 LBS | DIASTOLIC BLOOD PRESSURE: 58 MMHG | BODY MASS INDEX: 25.91 KG/M2 | HEART RATE: 73 BPM | SYSTOLIC BLOOD PRESSURE: 94 MMHG | TEMPERATURE: 97.6 F

## 2024-07-22 DIAGNOSIS — L08.9 INFECTED CYST OF SKIN: ICD-10-CM

## 2024-07-22 DIAGNOSIS — G40.409 OTHER GENERALIZED EPILEPSY, NOT INTRACTABLE, WITHOUT STATUS EPILEPTICUS (H): ICD-10-CM

## 2024-07-22 DIAGNOSIS — L72.9 INFECTED CYST OF SKIN: ICD-10-CM

## 2024-07-22 DIAGNOSIS — L72.3 SEBACEOUS CYST: Primary | ICD-10-CM

## 2024-07-22 PROCEDURE — 99203 OFFICE O/P NEW LOW 30 MIN: CPT | Performed by: STUDENT IN AN ORGANIZED HEALTH CARE EDUCATION/TRAINING PROGRAM

## 2024-07-22 ASSESSMENT — PAIN SCALES - GENERAL: PAINLEVEL: NO PAIN (0)

## 2024-07-22 NOTE — NURSING NOTE
"Initial BP 94/58 (BP Location: Right arm, Patient Position: Chair, Cuff Size: Adult Regular)   Pulse 73   Temp 97.6  F (36.4  C) (Tympanic)   Wt 73.9 kg (163 lb)   LMP 07/06/2016 (LMP Unknown)   BMI 25.91 kg/m   Estimated body mass index is 25.91 kg/m  as calculated from the following:    Height as of 7/10/24: 1.689 m (5' 6.5\").    Weight as of this encounter: 73.9 kg (163 lb). .  Mona Hollingsworth LPN    "

## 2024-07-22 NOTE — PROGRESS NOTES
Surgical Consultation/History and Physical  [facility]    Magaly is seen in consultation for a back mass, at the request of Michelle Arguelles PA-C.    Chief Complaint:  back mass    History of Present Illness: Magaly Renee is a 63 year old female presents with a back mass. This has been present for several weeks now.  She has noticed it rubbing against her sports bra and is irritating when she is trying to do activities such as yard work.  No fevers or chills, no nausea or emesis.  Denies any bleeding or drainage from that spot.  She has never had similar symptoms in the past.    Patient Active Problem List   Diagnosis    Other generalized epilepsy, not intractable, without status epilepticus (H)    Persistent Leukopenia    Migraine headache    Disorder of optic chiasm       Past Medical History:   Diagnosis Date    Localization-related epilepsy (H)     Migraine     Migraine, unspecified, without mention of intractable migraine without mention of status migrainosus     Migraine    Other convulsions age of 12    Epilepsy       Past Surgical History:   Procedure Laterality Date    AS TOTAL HIP ARTHROPLASTY Right 10/27/2022    C/SECTION, LOW TRANSVERSE  2000    , Low Transverse       Family History   Problem Relation Age of Onset    Neurologic Disorder Mother         migraine    Bone Cancer Father     Diabetes No family hx of     C.A.D. No family hx of     Hypertension No family hx of     Cancer - colorectal No family hx of     Prostate Cancer No family hx of     Breast Cancer No family hx of     Cerebrovascular Disease No family hx of     Osteoporosis No family hx of        Social History     Tobacco Use    Smoking status: Never    Smokeless tobacco: Never   Substance Use Topics    Alcohol use: Yes     Comment: occassion        History   Drug Use No       Current Outpatient Medications   Medication Sig Dispense Refill    lamoTRIgine (LAMICTAL) 100 MG tablet Take 2 tablets  (200 mg) in the AM and 3 tablets (300 mg) in the PM. 450 tablet 3    levETIRAcetam (KEPPRA) 1000 MG tablet Take 2 tablets (2,000 mg) by mouth 2 times daily Please schedule a follow up appointment for further refills. [phone] 120 tablet 1       Allergies   Allergen Reactions    Bee Venom Anaphylaxis    Sulfa Antibiotics Other (See Comments)       Review of Systems:   10 point ROS negative listed in HPI    Physical Exam:  BP 94/58 (BP Location: Right arm, Patient Position: Chair, Cuff Size: Adult Regular)   Pulse 73   Temp 97.6  F (36.4  C) (Tympanic)   Wt 73.9 kg (163 lb)   LMP 07/06/2016 (LMP Unknown)   BMI 25.91 kg/m      Constitutional- No acute distress, well nourished, non-toxic  Eyes: Anicteric, no injection.  PERRL  ENT:  Normocephalic, atraumatic, Nose midline, moist mucus membranes  Neck - supple, no LAD  Respiratory- Nonlabored breathing on room air  Cardiovascular - Extremities warm and well perfused.  Abdomen - Nondistended  Neuro - No focal neuro deficits, Alert and oriented x 3  Psych: Appropriate mood and affect  Musculoskeletal: Normal gait, symmetric strength.  FROM upper and lower extremities.  Skin: Warm, Dry. 3 cm mass over left upper back with central umbilication. Soft, nontender, no overlying erythema or drainage    Assessment:  1. Magaly Renee has an enlarging 3 cm mass located on her upper left back. This is a sebaceous cyst based on clinical exam. She may benefit from excision of this mass, which may be scheduled at her convenience. Given the size of the mass, I recommend this be done in the operating room with patient prone.  The indications, risks, benefits and alternatives of excision of this mass were discussed with Magaly Renee. The risks discussed included but were not limited to bleeding, infection, seroma, need for additional treatment, nontherapeutic intervention, wound complication (such as dehiscence), and rare complications related to surgery and/or anesthesia  such as venous thromboembolism and cardiorespiratory complications.  All of her questions were answered thoroughly and to her satisfaction, and informed consent was obtained. Consent was verified via teach back methodology.        Brian Valerio MD on 7/22/2024 at 10:13 AM

## 2024-07-22 NOTE — LETTER
2024      Magaly Renee  [address] [place] [address]      Dear Colleague,    Thank you for referring your patient, Magaly Renee, to the [facility]. Please see a copy of my visit note below.    Surgical Consultation/History and Physical  [facility]    Magaly is seen in consultation for a back mass, at the request of Michelle Arguelles PA-C.    Chief Complaint:  back mass    History of Present Illness: Magaly Renee is a 63 year old female presents with a back mass. This has been present for several weeks now.  She has noticed it rubbing against her sports bra and is irritating when she is trying to do activities such as yard work.  No fevers or chills, no nausea or emesis.  Denies any bleeding or drainage from that spot.  She has never had similar symptoms in the past.    Patient Active Problem List   Diagnosis     Other generalized epilepsy, not intractable, without status epilepticus (H)     Persistent Leukopenia     Migraine headache     Disorder of optic chiasm       Past Medical History:   Diagnosis Date     Localization-related epilepsy (H)      Migraine      Migraine, unspecified, without mention of intractable migraine without mention of status migrainosus     Migraine     Other convulsions age of 12    Epilepsy       Past Surgical History:   Procedure Laterality Date     AS TOTAL HIP ARTHROPLASTY Right 10/27/2022     C/SECTION, LOW TRANSVERSE  2000    , Low Transverse       Family History   Problem Relation Age of Onset     Neurologic Disorder Mother         migraine     Bone Cancer Father      Diabetes No family hx of      C.A.D. No family hx of      Hypertension No family hx of      Cancer - colorectal No family hx of      Prostate Cancer No family hx of      Breast Cancer No family hx of      Cerebrovascular Disease No family hx of      Osteoporosis No family hx of        Social History     Tobacco Use     Smoking  status: Never     Smokeless tobacco: Never   Substance Use Topics     Alcohol use: Yes     Comment: occassion        History   Drug Use No       Current Outpatient Medications   Medication Sig Dispense Refill     lamoTRIgine (LAMICTAL) 100 MG tablet Take 2 tablets (200 mg) in the AM and 3 tablets (300 mg) in the PM. 450 tablet 3     levETIRAcetam (KEPPRA) 1000 MG tablet Take 2 tablets (2,000 mg) by mouth 2 times daily Please schedule a follow up appointment for further refills. [phone] 120 tablet 1       Allergies   Allergen Reactions     Bee Venom Anaphylaxis     Sulfa Antibiotics Other (See Comments)       Review of Systems:   10 point ROS negative listed in HPI    Physical Exam:  BP 94/58 (BP Location: Right arm, Patient Position: Chair, Cuff Size: Adult Regular)   Pulse 73   Temp 97.6  F (36.4  C) (Tympanic)   Wt 73.9 kg (163 lb)   LMP 07/06/2016 (LMP Unknown)   BMI 25.91 kg/m      Constitutional- No acute distress, well nourished, non-toxic  Eyes: Anicteric, no injection.  PERRL  ENT:  Normocephalic, atraumatic, Nose midline, moist mucus membranes  Neck - supple, no LAD  Respiratory- Nonlabored breathing on room air  Cardiovascular - Extremities warm and well perfused.  Abdomen - Nondistended  Neuro - No focal neuro deficits, Alert and oriented x 3  Psych: Appropriate mood and affect  Musculoskeletal: Normal gait, symmetric strength.  FROM upper and lower extremities.  Skin: Warm, Dry. 3 cm mass over left upper back with central umbilication. Soft, nontender, no overlying erythema or drainage    Assessment:  1. Magaly Renee has an enlarging 3 cm mass located on her upper left back. This is a sebaceous cyst based on clinical exam. She may benefit from excision of this mass, which may be scheduled at her convenience. Given the size of the mass, I recommend this be done in the operating room with patient prone.  The indications, risks, benefits and alternatives of excision of this mass were  discussed with Magaly Renee. The risks discussed included but were not limited to bleeding, infection, seroma, need for additional treatment, nontherapeutic intervention, wound complication (such as dehiscence), and rare complications related to surgery and/or anesthesia such as venous thromboembolism and cardiorespiratory complications.  All of her questions were answered thoroughly and to her satisfaction, and informed consent was obtained. Consent was verified via teach back methodology.        Brian Valerio MD on 7/22/2024 at 10:13 AM    Again, thank you for allowing me to participate in the care of your patient.        Sincerely,        Brian Valerio MD

## 2024-07-22 NOTE — PATIENT INSTRUCTIONS
Plan for elective cyst removal from back to be done in operating room  Will need pre-op  H&P with PCP prior to procedure

## 2024-07-23 NOTE — TELEPHONE ENCOUNTER
Type of surgery: EXCISION, MASS, BACK (Left)   Location of surgery: [place] [facility]  Date and time of surgery: 8-14-24  Surgeon: Karina  Pre-Op Appt Date: 7-29-24  Post-Op Appt Date: 9-3-24   Packet sent out: Yes  Pre-cert/Authorization completed:    Date:

## 2024-08-01 ENCOUNTER — OFFICE VISIT (OUTPATIENT)
Dept: FAMILY MEDICINE | Facility: CLINIC | Age: 64
End: 2024-08-01
Payer: COMMERCIAL

## 2024-08-01 ENCOUNTER — ORDERS ONLY (AUTO-RELEASED) (OUTPATIENT)
Dept: FAMILY MEDICINE | Facility: CLINIC | Age: 64
End: 2024-08-01

## 2024-08-01 VITALS
DIASTOLIC BLOOD PRESSURE: 64 MMHG | BODY MASS INDEX: 25.27 KG/M2 | SYSTOLIC BLOOD PRESSURE: 118 MMHG | TEMPERATURE: 97 F | OXYGEN SATURATION: 97 % | HEIGHT: 67 IN | WEIGHT: 161 LBS | HEART RATE: 70 BPM

## 2024-08-01 DIAGNOSIS — Z01.818 PREOP GENERAL PHYSICAL EXAM: Primary | ICD-10-CM

## 2024-08-01 DIAGNOSIS — Z12.11 SCREEN FOR COLON CANCER: ICD-10-CM

## 2024-08-01 DIAGNOSIS — Z13.1 ENCOUNTER FOR SCREENING EXAMINATION FOR IMPAIRED GLUCOSE REGULATION AND DIABETES MELLITUS: ICD-10-CM

## 2024-08-01 DIAGNOSIS — L72.3 SEBACEOUS CYST: ICD-10-CM

## 2024-08-01 DIAGNOSIS — G40.409 OTHER GENERALIZED EPILEPSY, NOT INTRACTABLE, WITHOUT STATUS EPILEPTICUS (H): ICD-10-CM

## 2024-08-01 DIAGNOSIS — Z12.31 VISIT FOR SCREENING MAMMOGRAM: ICD-10-CM

## 2024-08-01 DIAGNOSIS — Z13.220 SCREENING FOR LIPOID DISORDERS: ICD-10-CM

## 2024-08-01 PROCEDURE — 99214 OFFICE O/P EST MOD 30 MIN: CPT | Performed by: PHYSICIAN ASSISTANT

## 2024-08-01 NOTE — PROGRESS NOTES
Preoperative Evaluation  [facility]  [address]  [facility] [address]  Phone: [phone]  Primary Provider: Michelle Arguelles PA-C  Pre-op Performing Provider: Michelle Arguelles PA-C  Aug 1, 2024             8/1/2024   Surgical Information   What procedure is being done? Excision back mass   Facility or Hospital where procedure/surgery will be performed: [facility]   Who is doing the procedure / surgery? Brian Valerio MD   Date of surgery / procedure: 08/14/2024   Time of surgery / procedure: 12:45   Where do you plan to recover after surgery? at home with family        Fax number for surgical facility: Note does not need to be faxed, will be available electronically in Epic.    Assessment & Plan     The proposed surgical procedure is considered INTERMEDIATE risk.    Preop general physical exam  Approval given.    Sebaceous cyst  Needs removal.    Other generalized epilepsy, not intractable, without status epilepticus (H)  Well controlled, seizure free. Upcoming neurology appointment scheduled.  - Keppra (Levetiracetam) Level; Future  - Lamotrigine Level; Future    Screening for lipoid disorders  Will schedule fasting labwork.  - Lipid panel reflex to direct LDL Fasting; Future    Encounter for screening examination for impaired glucose regulation and diabetes mellitus  - Comprehensive metabolic panel (BMP + Alb, Alk Phos, ALT, AST, Total. Bili, TP); Future    Screen for colon cancer  - COLOGUARD([employer] SCI[employer]ES); Future    Visit for screening mammogram  - MA Screen Bilateral w/Naveed; Future            - No identified additional risk factors other than previously addressed    Antiplatelet or Anticoagulation Medication Instructions   - Patient is on no antiplatelet or anticoagulation medications.    Additional Medication Instructions   - Antiepileptics: Continue without modification.    Recommendation  Approval given to proceed with proposed procedure, without further  diagnostic evaluation.    Refugio Jones is a 63 year old, presenting for the following:  Pre-Op Exam          8/1/2024     8:51 AM   Additional Questions   Roomed by KITTY Dorsey     HPI related to upcoming procedure: enlarging 3 cm mass located on her upper left back - sebaceous cyst.        8/1/2024   Pre-Op Questionnaire   Have you ever had a heart attack or stroke? No   Have you ever had surgery on your heart or blood vessels, such as a stent placement, a coronary artery bypass, or surgery on an artery in your head, neck, heart, or legs? No   Do you have chest pain with activity? No   Do you have a history of heart failure? No   Do you currently have a cold, bronchitis or symptoms of other infection? No   Do you have a cough, shortness of breath, or wheezing? No   Do you or anyone in your family have previous history of blood clots? No   Do you or does anyone in your family have a serious bleeding problem such as prolonged bleeding following surgeries or cuts? No   Have you ever had problems with anemia or been told to take iron pills? No   Have you had any abnormal blood loss such as black, tarry or bloody stools, or abnormal vaginal bleeding? No   Have you ever had a blood transfusion? No   Are you willing to have a blood transfusion if it is medically needed before, during, or after your surgery? Yes   Have you or any of your relatives ever had problems with anesthesia? No   Do you have sleep apnea, excessive snoring or daytime drowsiness? No   Do you have any artifical heart valves or other implanted medical devices like a pacemaker, defibrillator, or continuous glucose monitor? No   Do you have artificial joints? (!) YES - right hip   Are you allergic to latex? No        Health Care Directive  Patient does not have a Health Care Directive or Living Will: Discussed advance care planning with patient; information given to patient to review.    Preoperative Review of    reviewed - no record of  controlled substances prescribed.      Status of Chronic Conditions:  See problem list for active medical problems.  Problems all longstanding and stable, except as noted/documented.  See ROS for pertinent symptoms related to these conditions.    Seizure free for years on current medication regimen    Patient Active Problem List    Diagnosis Date Noted    Disorder of optic chiasm 2018     Priority: Medium    Migraine headache 2012     Priority: Medium     2012, Hx of migraine for > 6 years, used to follow with Dr Benja coelho, [facility], for years, he is in [place] ?, need meds refill of Fioricet with codeine.      Persistent Leukopenia 2008     Priority: Medium     ?Keppra vs other.  Check peripheral smear and consider hematology      Other generalized epilepsy, not intractable, without status epilepticus (H) 2006     Priority: Medium     Last Exam: 2006  Medications: Keppra, Carbatrol  Comments: Last seizure   Typically sees neurologist at [facility]  Problem list name updated by automated process. Provider to review        Past Medical History:   Diagnosis Date    Localization-related epilepsy (H)     Migraine     Migraine, unspecified, without mention of intractable migraine without mention of status migrainosus     Migraine    Other convulsions age of 12    Epilepsy     Past Surgical History:   Procedure Laterality Date    AS TOTAL HIP ARTHROPLASTY Right 10/27/2022    C/SECTION, LOW TRANSVERSE  2000    , Low Transverse     Current Outpatient Medications   Medication Sig Dispense Refill    lamoTRIgine (LAMICTAL) 100 MG tablet Take 2 tablets (200 mg) in the AM and 3 tablets (300 mg) in the PM. 450 tablet 3    levETIRAcetam (KEPPRA) 1000 MG tablet Take 2 tablets (2,000 mg) by mouth 2 times daily Please schedule a follow up appointment for further refills. [phone] 120 tablet 1       Allergies   Allergen Reactions    Bee  "Venom Anaphylaxis    Sulfa Antibiotics Other (See Comments)        Social History     Tobacco Use    Smoking status: Never    Smokeless tobacco: Never   Substance Use Topics    Alcohol use: Yes     Comment: occassion     Family History   Problem Relation Age of Onset    Neurologic Disorder Mother         migraine    Bone Cancer Father     Diabetes No family hx of     C.A.D. No family hx of     Hypertension No family hx of     Cancer - colorectal No family hx of     Prostate Cancer No family hx of     Breast Cancer No family hx of     Cerebrovascular Disease No family hx of     Osteoporosis No family hx of      History   Drug Use No             Review of Systems  CONSTITUTIONAL: NEGATIVE for fever, chills, change in weight  INTEGUMENTARY/SKIN: NEGATIVE for worrisome rashes, moles or lesions  EYES: NEGATIVE for vision changes or irritation  ENT/MOUTH: NEGATIVE for ear, mouth and throat problems  RESP: NEGATIVE for significant cough or SOB  BREAST: NEGATIVE for masses, tenderness or discharge  CV: NEGATIVE for chest pain, palpitations or peripheral edema  GI: NEGATIVE for nausea, abdominal pain, heartburn, or change in bowel habits  : NEGATIVE for frequency, dysuria, or hematuria  MUSCULOSKELETAL: NEGATIVE for significant arthralgias or myalgia  NEURO: NEGATIVE for weakness, dizziness or paresthesias  ENDOCRINE: NEGATIVE for temperature intolerance, skin/hair changes  HEME: NEGATIVE for bleeding problems  PSYCHIATRIC: NEGATIVE for changes in mood or affect    Objective    LMP 07/06/2016 (LMP Unknown)    Estimated body mass index is 25.91 kg/m  as calculated from the following:    Height as of 7/10/24: 1.689 m (5' 6.5\").    Weight as of 7/22/24: 73.9 kg (163 lb).  Physical Exam  GENERAL: alert and no distress  EYES: Eyes grossly normal to inspection, PERRL and conjunctivae and sclerae normal  HENT: ear canals and TM's normal, nose and mouth without ulcers or lesions  NECK: no adenopathy, no asymmetry, masses, or " "scars  RESP: lungs clear to auscultation - no rales, rhonchi or wheezes  CV: regular rate and rhythm, normal S1 S2, no S3 or S4, no murmur, click or rub, no peripheral edema  ABDOMEN: soft, nontender, no hepatosplenomegaly, no masses and bowel sounds normal  MS: no gross musculoskeletal defects noted, no edema  SKIN: left upper back nontender sebaceous cyst, no signs of infection  NEURO: Normal strength and tone, mentation intact and speech normal  PSYCH: mentation appears normal, affect normal/bright    No results for input(s): \"HGB\", \"PLT\", \"INR\", \"NA\", \"POTASSIUM\", \"CR\", \"A1C\" in the last 8760 hours.     Diagnostics  No labs were ordered during this visit.   No EKG required for low risk surgery (cataract, skin procedure, breast biopsy, etc).  No EKG required, no history of coronary heart disease, significant arrhythmia, peripheral arterial disease or other structural heart disease.    Revised Cardiac Risk Index (RCRI)  The patient has the following serious cardiovascular risks for perioperative complications:   - No serious cardiac risks = 0 points     RCRI Interpretation: 0 points: Class I (very low risk - 0.4% complication rate)         Signed Electronically by: Michelle Arguelles PA-C  A copy of this evaluation report is provided to the requesting physician.        "

## 2024-08-01 NOTE — H&P (VIEW-ONLY)
Preoperative Evaluation  [facility]  [address]  [place] [address]  Phone: [phone]  Primary Provider: Michelle Arguelles PA-C  Pre-op Performing Provider: Michelle Arguelles PA-C  Aug 1, 2024             8/1/2024   Surgical Information   What procedure is being done? Excision back mass   Facility or Hospital where procedure/surgery will be performed: [facility]   Who is doing the procedure / surgery? Brian Valerio MD   Date of surgery / procedure: 08/14/2024   Time of surgery / procedure: 12:45   Where do you plan to recover after surgery? at home with family        Fax number for surgical facility: Note does not need to be faxed, will be available electronically in Epic.    Assessment & Plan     The proposed surgical procedure is considered INTERMEDIATE risk.    Preop general physical exam  Approval given.    Sebaceous cyst  Needs removal.    Other generalized epilepsy, not intractable, without status epilepticus (H)  Well controlled, seizure free. Upcoming neurology appointment scheduled.  - Keppra (Levetiracetam) Level; Future  - Lamotrigine Level; Future    Screening for lipoid disorders  Will schedule fasting labwork.  - Lipid panel reflex to direct LDL Fasting; Future    Encounter for screening examination for impaired glucose regulation and diabetes mellitus  - Comprehensive metabolic panel (BMP + Alb, Alk Phos, ALT, AST, Total. Bili, TP); Future    Screen for colon cancer  - COLOGUARD([employer]); Future    Visit for screening mammogram  - MA Screen Bilateral w/Naveed; Future            - No identified additional risk factors other than previously addressed    Antiplatelet or Anticoagulation Medication Instructions   - Patient is on no antiplatelet or anticoagulation medications.    Additional Medication Instructions   - Antiepileptics: Continue without modification.    Recommendation  Approval given to proceed with proposed procedure, without further  diagnostic evaluation.    Refugio Jones is a 63 year old, presenting for the following:  Pre-Op Exam          8/1/2024     8:51 AM   Additional Questions   Roomed by KITTY Dorsey     HPI related to upcoming procedure: enlarging 3 cm mass located on her upper left back - sebaceous cyst.        8/1/2024   Pre-Op Questionnaire   Have you ever had a heart attack or stroke? No   Have you ever had surgery on your heart or blood vessels, such as a stent placement, a coronary artery bypass, or surgery on an artery in your head, neck, heart, or legs? No   Do you have chest pain with activity? No   Do you have a history of heart failure? No   Do you currently have a cold, bronchitis or symptoms of other infection? No   Do you have a cough, shortness of breath, or wheezing? No   Do you or anyone in your family have previous history of blood clots? No   Do you or does anyone in your family have a serious bleeding problem such as prolonged bleeding following surgeries or cuts? No   Have you ever had problems with anemia or been told to take iron pills? No   Have you had any abnormal blood loss such as black, tarry or bloody stools, or abnormal vaginal bleeding? No   Have you ever had a blood transfusion? No   Are you willing to have a blood transfusion if it is medically needed before, during, or after your surgery? Yes   Have you or any of your relatives ever had problems with anesthesia? No   Do you have sleep apnea, excessive snoring or daytime drowsiness? No   Do you have any artifical heart valves or other implanted medical devices like a pacemaker, defibrillator, or continuous glucose monitor? No   Do you have artificial joints? (!) YES - right hip   Are you allergic to latex? No        Health Care Directive  Patient does not have a Health Care Directive or Living Will: Discussed advance care planning with patient; information given to patient to review.    Preoperative Review of    reviewed - no record of  controlled substances prescribed.      Status of Chronic Conditions:  See problem list for active medical problems.  Problems all longstanding and stable, except as noted/documented.  See ROS for pertinent symptoms related to these conditions.    Seizure free for years on current medication regimen    Patient Active Problem List    Diagnosis Date Noted    Disorder of optic chiasm 2018     Priority: Medium    Migraine headache 2012     Priority: Medium     2012, Hx of migraine for > 6 years, used to follow with Dr Benja coelho, [facility]ile[facility], for years, he is in [place] ?, need meds refill of Fioricet with codeine.      Persistent Leukopenia 2008     Priority: Medium     ?Keppra vs other.  Check peripheral smear and consider hematology      Other generalized epilepsy, not intractable, without status epilepticus (H) 2006     Priority: Medium     Last Exam: 2006  Medications: Keppra, Carbatrol  Comments: Last seizure   Typically sees neurologist at [facility]  Problem list name updated by automated process. Provider to review        Past Medical History:   Diagnosis Date    Localization-related epilepsy (H)     Migraine     Migraine, unspecified, without mention of intractable migraine without mention of status migrainosus     Migraine    Other convulsions age of 12    Epilepsy     Past Surgical History:   Procedure Laterality Date    AS TOTAL HIP ARTHROPLASTY Right 10/27/2022    C/SECTION, LOW TRANSVERSE  2000    , Low Transverse     Current Outpatient Medications   Medication Sig Dispense Refill    lamoTRIgine (LAMICTAL) 100 MG tablet Take 2 tablets (200 mg) in the AM and 3 tablets (300 mg) in the PM. 450 tablet 3    levETIRAcetam (KEPPRA) 1000 MG tablet Take 2 tablets (2,000 mg) by mouth 2 times daily Please schedule a follow up appointment for further refills. [phone] 120 tablet 1       Allergies   Allergen Reactions    Bee  "Venom Anaphylaxis    Sulfa Antibiotics Other (See Comments)        Social History     Tobacco Use    Smoking status: Never    Smokeless tobacco: Never   Substance Use Topics    Alcohol use: Yes     Comment: occassion     Family History   Problem Relation Age of Onset    Neurologic Disorder Mother         migraine    Bone Cancer Father     Diabetes No family hx of     C.A.D. No family hx of     Hypertension No family hx of     Cancer - colorectal No family hx of     Prostate Cancer No family hx of     Breast Cancer No family hx of     Cerebrovascular Disease No family hx of     Osteoporosis No family hx of      History   Drug Use No             Review of Systems  CONSTITUTIONAL: NEGATIVE for fever, chills, change in weight  INTEGUMENTARY/SKIN: NEGATIVE for worrisome rashes, moles or lesions  EYES: NEGATIVE for vision changes or irritation  ENT/MOUTH: NEGATIVE for ear, mouth and throat problems  RESP: NEGATIVE for significant cough or SOB  BREAST: NEGATIVE for masses, tenderness or discharge  CV: NEGATIVE for chest pain, palpitations or peripheral edema  GI: NEGATIVE for nausea, abdominal pain, heartburn, or change in bowel habits  : NEGATIVE for frequency, dysuria, or hematuria  MUSCULOSKELETAL: NEGATIVE for significant arthralgias or myalgia  NEURO: NEGATIVE for weakness, dizziness or paresthesias  ENDOCRINE: NEGATIVE for temperature intolerance, skin/hair changes  HEME: NEGATIVE for bleeding problems  PSYCHIATRIC: NEGATIVE for changes in mood or affect    Objective    LMP 07/06/2016 (LMP Unknown)    Estimated body mass index is 25.91 kg/m  as calculated from the following:    Height as of 7/10/24: 1.689 m (5' 6.5\").    Weight as of 7/22/24: 73.9 kg (163 lb).  Physical Exam  GENERAL: alert and no distress  EYES: Eyes grossly normal to inspection, PERRL and conjunctivae and sclerae normal  HENT: ear canals and TM's normal, nose and mouth without ulcers or lesions  NECK: no adenopathy, no asymmetry, masses, or " "scars  RESP: lungs clear to auscultation - no rales, rhonchi or wheezes  CV: regular rate and rhythm, normal S1 S2, no S3 or S4, no murmur, click or rub, no peripheral edema  ABDOMEN: soft, nontender, no hepatosplenomegaly, no masses and bowel sounds normal  MS: no gross musculoskeletal defects noted, no edema  SKIN: left upper back nontender sebaceous cyst, no signs of infection  NEURO: Normal strength and tone, mentation intact and speech normal  PSYCH: mentation appears normal, affect normal/bright    No results for input(s): \"HGB\", \"PLT\", \"INR\", \"NA\", \"POTASSIUM\", \"CR\", \"A1C\" in the last 8760 hours.     Diagnostics  No labs were ordered during this visit.   No EKG required for low risk surgery (cataract, skin procedure, breast biopsy, etc).  No EKG required, no history of coronary heart disease, significant arrhythmia, peripheral arterial disease or other structural heart disease.    Revised Cardiac Risk Index (RCRI)  The patient has the following serious cardiovascular risks for perioperative complications:   - No serious cardiac risks = 0 points     RCRI Interpretation: 0 points: Class I (very low risk - 0.4% complication rate)         Signed Electronically by: Michelle Arguelles PA-C  A copy of this evaluation report is provided to the requesting physician.        "

## 2024-08-01 NOTE — PATIENT INSTRUCTIONS
Try doxylamine over the counter - try breaking in half first    How to Take Your Medication Before Surgery  Preoperative Medication Instructions   Antiplatelet or Anticoagulation Medication Instructions   - Patient is on no antiplatelet or anticoagulation medications.    Additional Medication Instructions   - Antiepileptics: Continue without modification.       Patient Education   Preparing for Your Surgery  Getting started  A nurse will call you to review your health history and instructions. They will give you an arrival time based on your scheduled surgery time. Please be ready to share:  Your doctor's clinic name and phone number  Your medical, surgical, and anesthesia history  A list of allergies and sensitivities  A list of medicines, including herbal treatments and over-the-counter drugs  Whether the patient has a legal guardian (ask how to send us the papers in advance)  Please tell us if you're pregnant--or if there's any chance you might be pregnant. Some surgeries may injure a fetus (unborn baby), so they require a pregnancy test. Surgeries that are safe for a fetus don't always need a test, and you can choose whether to have one.   If you have a child who's having surgery, please ask for a copy of Preparing for Your Child's Surgery.    Preparing for surgery  Within 10 to 30 days of surgery: Have a pre-op exam (sometimes called an H&P, or History and Physical). This can be done at a clinic or pre-operative center.  If you're having a , you may not need this exam. Talk to your care team.  At your pre-op exam, talk to your care team about all medicines you take. If you need to stop any medicines before surgery, ask when to start taking them again.  We do this for your safety. Many medicines can make you bleed too much during surgery. Some change how well surgery (anesthesia) drugs work.  Call your insurance company to let them know you're having surgery. (If you don't have insurance, call  [phone].)  Call your clinic if there's any change in your health. This includes signs of a cold or flu (sore throat, runny nose, cough, rash, fever). It also includes a scrape or scratch near the surgery site.  If you have questions on the day of surgery, call your hospital or surgery center.  Eating and drinking guidelines  For your safety: Unless your surgeon tells you otherwise, follow the guidelines below.  Eat and drink as usual until 8 hours before you arrive for surgery. After that, no food or milk.  Drink clear liquids until 2 hours before you arrive. These are liquids you can see through, like water, Gatorade, and Propel Water. They also include plain black coffee and tea (no cream or milk), candy, and breath mints. You can spit out gum when you arrive.  If you drink alcohol: Stop drinking it the night before surgery.  If your care team tells you to take medicine on the morning of surgery, it's okay to take it with a sip of water.  Preventing infection  Shower or bathe the night before and morning of your surgery. Follow the instructions your clinic gave you. (If no instructions, use regular soap.)  Don't shave or clip hair near your surgery site. We'll remove the hair if needed.  Don't smoke or vape the morning of surgery. You may chew nicotine gum up to 2 hours before surgery. A nicotine patch is okay.  Note: Some surgeries require you to completely quit smoking and nicotine. Check with your surgeon.  Your care team will make every effort to keep you safe from infection. We will:  Clean our hands often with soap and water (or an alcohol-based hand rub).  Clean the skin at your surgery site with a special soap that kills germs.  Give you a special gown to keep you warm. (Cold raises the risk of infection.)  Wear special hair covers, masks, gowns and gloves during surgery.  Give antibiotic medicine, if prescribed. Not all surgeries need antibiotics.  What to bring on the day of surgery  Photo ID and  insurance card  Copy of your health care directive, if you have one  Glasses and hearing aids (bring cases)  You can't wear contacts during surgery  Inhaler and eye drops, if you use them (tell us about these when you arrive)  CPAP machine or breathing device, if you use them  A few personal items, if spending the night  If you have . . .  A pacemaker, ICD (cardiac defibrillator) or other implant: Bring the ID card.  An implanted stimulator: Bring the remote control.  A legal guardian: Bring a copy of the certified (court-stamped) guardianship papers.  Please remove any jewelry, including body piercings. Leave jewelry and other valuables at home.  If you're going home the day of surgery  You must have a responsible adult drive you home. They should stay with you overnight as well.  If you don't have someone to stay with you, and you aren't safe to go home alone, we may keep you overnight. Insurance often won't pay for this.  After surgery  If it's hard to control your pain or you need more pain medicine, please call your surgeon's office.  Questions?   If you have any questions for your care team, list them here: _________________________________________________________________________________________________________________________________________________________________________ ____________________________________ ____________________________________ ____________________________________  For informational purposes only. Not to replace the advice of your health care provider. Copyright   2003, 2019 [facility]. All rights reserved. Clinically reviewed by Luci Louise MD. [employer] [id number] - REV 12/22.

## 2024-08-13 ENCOUNTER — ANESTHESIA EVENT (OUTPATIENT)
Dept: SURGERY | Facility: CLINIC | Age: 64
End: 2024-08-13
Payer: COMMERCIAL

## 2024-08-13 ASSESSMENT — ENCOUNTER SYMPTOMS: SEIZURES: 1

## 2024-08-13 NOTE — ANESTHESIA PREPROCEDURE EVALUATION
Anesthesia Pre-Procedure Evaluation    Patient: Magaly Renee   MRN: [id number] : 1960        Procedure : Procedure(s):  EXCISION, MASS, BACK          Past Medical History:   Diagnosis Date    Localization-related epilepsy (H)     Migraine     Migraine, unspecified, without mention of intractable migraine without mention of status migrainosus     Migraine    Other convulsions age of 12    Epilepsy      Past Surgical History:   Procedure Laterality Date    AS TOTAL HIP ARTHROPLASTY Right 10/27/2022    C/SECTION, LOW TRANSVERSE  2000    , Low Transverse      Allergies   Allergen Reactions    Bee Venom Anaphylaxis    Sulfa Antibiotics Other (See Comments)      Social History     Tobacco Use    Smoking status: Never    Smokeless tobacco: Never   Substance Use Topics    Alcohol use: Yes     Comment: occassion      Wt Readings from Last 1 Encounters:   24 73 kg (161 lb)        Anesthesia Evaluation   Pt has had prior anesthetic. Type: Regional, General and MAC.    No history of anesthetic complications       ROS/MED HX  ENT/Pulmonary:  - neg pulmonary ROS     Neurologic:     (+)      migraines, seizures,                         Cardiovascular:  - neg cardiovascular ROS   (+)  - -   -  - -                                 Previous cardiac testing   Echo: Date: Results:    Stress Test:  Date: Results:    ECG Reviewed:  Date: 10/9/22 Results:  Sinus  Rhythm   WITHIN NORMAL LIMITS  Cath:  Date: Results:      METS/Exercise Tolerance: >4 METS    Hematologic:  - neg hematologic  ROS     Musculoskeletal:  - neg musculoskeletal ROS     GI/Hepatic:  - neg GI/hepatic ROS     Renal/Genitourinary:  - neg Renal ROS     Endo:  - neg endo ROS     Psychiatric/Substance Use:  - neg psychiatric ROS     Infectious Disease:  - neg infectious disease ROS     Malignancy:  - neg malignancy ROS     Other:  - neg other ROS          Physical Exam    Airway  airway exam normal      Mallampati: II   TM distance: >  "3 FB   Neck ROM: full   Mouth opening: > 3 cm    Respiratory Devices and Support         Dental       (+) Modest Abnormalities - crowns, retainers, 1 or 2 missing teeth      Cardiovascular   cardiovascular exam normal       Rhythm and rate: regular and normal     Pulmonary   pulmonary exam normal        breath sounds clear to auscultation           OUTSIDE LABS:  CBC:   Lab Results   Component Value Date    WBC 3.5 (L) 10/26/2022    WBC 3.6 (L) 03/29/2018    HGB 13.7 10/26/2022    HGB 13.1 03/29/2018    HCT 42.2 10/26/2022    HCT 40.2 03/29/2018     10/26/2022     03/29/2018     BMP:   Lab Results   Component Value Date     10/26/2022     07/25/2022    POTASSIUM 4.2 10/26/2022    POTASSIUM 4.3 07/25/2022    CHLORIDE 103 10/26/2022    CHLORIDE 109 07/25/2022    CO2 26 10/26/2022    CO2 25 07/25/2022    BUN 25.6 (H) 10/26/2022    BUN 23 07/25/2022    CR 0.61 10/26/2022    CR 0.69 07/25/2022     (H) 10/26/2022     (H) 07/25/2022     COAGS: No results found for: \"PTT\", \"INR\", \"FIBR\"  POC: No results found for: \"BGM\", \"HCG\", \"HCGS\"  HEPATIC:   Lab Results   Component Value Date    ALBUMIN 4.1 07/25/2022    PROTTOTAL 7.2 07/25/2022    ALT 27 07/25/2022    AST 16 07/25/2022    ALKPHOS 68 07/25/2022    BILITOTAL 0.3 07/25/2022     OTHER:   Lab Results   Component Value Date    IRVING 9.8 10/26/2022       Anesthesia Plan    ASA Status:  2    NPO Status:  NPO Appropriate    Anesthesia Type: General.     - Airway: Native airway   Induction: Propofol, Intravenous.   Maintenance: TIVA.        Consents    Anesthesia Plan(s) and associated risks, benefits, and realistic alternatives discussed. Questions answered and patient/representative(s) expressed understanding.     - Discussed: Risks, Benefits and Alternatives for BOTH SEDATION and the PROCEDURE were discussed     - Discussed with:  Patient      - Extended Intubation/Ventilatory Support Discussed: No.      - Patient is DNR/DNI Status: No   " "  Use of blood products discussed: No .     Postoperative Care    Pain management: Oral pain medications, IV analgesics, Multi-modal analgesia.   PONV prophylaxis: Ondansetron (or other 5HT-3), Dexamethasone or Solumedrol, Background Propofol Infusion     Comments:               FREYA Aguero CRNA    I have reviewed the pertinent notes and labs in the chart from the past 30 days and (re)examined the patient.  Any updates or changes from those notes are reflected in this note.              # Overweight: Estimated body mass index is 25.6 kg/m  as calculated from the following:    Height as of 8/1/24: 1.689 m (5' 6.5\").    Weight as of 8/1/24: 73 kg (161 lb).      "

## 2024-08-14 ENCOUNTER — ANESTHESIA (OUTPATIENT)
Dept: SURGERY | Facility: CLINIC | Age: 64
End: 2024-08-14
Payer: COMMERCIAL

## 2024-08-14 ENCOUNTER — HOSPITAL ENCOUNTER (OUTPATIENT)
Facility: CLINIC | Age: 64
Discharge: HOME | End: 2024-08-14
Attending: STUDENT IN AN ORGANIZED HEALTH CARE EDUCATION/TRAINING PROGRAM | Admitting: STUDENT IN AN ORGANIZED HEALTH CARE EDUCATION/TRAINING PROGRAM
Payer: COMMERCIAL

## 2024-08-14 VITALS
SYSTOLIC BLOOD PRESSURE: 112 MMHG | HEIGHT: 66 IN | HEART RATE: 82 BPM | RESPIRATION RATE: 16 BRPM | WEIGHT: 161 LBS | TEMPERATURE: 97.6 F | DIASTOLIC BLOOD PRESSURE: 62 MMHG | OXYGEN SATURATION: 98 % | BODY MASS INDEX: 25.88 KG/M2

## 2024-08-14 DIAGNOSIS — L72.3 SEBACEOUS CYST: Primary | ICD-10-CM

## 2024-08-14 PROCEDURE — 12031 INTMD RPR S/A/T/EXT 2.5 CM/<: CPT | Performed by: STUDENT IN AN ORGANIZED HEALTH CARE EDUCATION/TRAINING PROGRAM

## 2024-08-14 PROCEDURE — 250N000009 HC RX 250: Performed by: STUDENT IN AN ORGANIZED HEALTH CARE EDUCATION/TRAINING PROGRAM

## 2024-08-14 PROCEDURE — 271N000001 HC OR GENERAL SUPPLY NON-STERILE: Performed by: STUDENT IN AN ORGANIZED HEALTH CARE EDUCATION/TRAINING PROGRAM

## 2024-08-14 PROCEDURE — 250N000011 HC RX IP 250 OP 636: Performed by: STUDENT IN AN ORGANIZED HEALTH CARE EDUCATION/TRAINING PROGRAM

## 2024-08-14 PROCEDURE — 250N000009 HC RX 250: Performed by: NURSE ANESTHETIST, CERTIFIED REGISTERED

## 2024-08-14 PROCEDURE — 11403 EXC TR-EXT B9+MARG 2.1-3CM: CPT | Performed by: STUDENT IN AN ORGANIZED HEALTH CARE EDUCATION/TRAINING PROGRAM

## 2024-08-14 PROCEDURE — 250N000013 HC RX MED GY IP 250 OP 250 PS 637: Performed by: NURSE ANESTHETIST, CERTIFIED REGISTERED

## 2024-08-14 PROCEDURE — 272N000001 HC OR GENERAL SUPPLY STERILE: Performed by: STUDENT IN AN ORGANIZED HEALTH CARE EDUCATION/TRAINING PROGRAM

## 2024-08-14 PROCEDURE — 370N000017 HC ANESTHESIA TECHNICAL FEE, PER MIN: Performed by: STUDENT IN AN ORGANIZED HEALTH CARE EDUCATION/TRAINING PROGRAM

## 2024-08-14 PROCEDURE — 999N000141 HC STATISTIC PRE-PROCEDURE NURSING ASSESSMENT: Performed by: STUDENT IN AN ORGANIZED HEALTH CARE EDUCATION/TRAINING PROGRAM

## 2024-08-14 PROCEDURE — 88304 TISSUE EXAM BY PATHOLOGIST: CPT | Mod: TC | Performed by: STUDENT IN AN ORGANIZED HEALTH CARE EDUCATION/TRAINING PROGRAM

## 2024-08-14 PROCEDURE — 710N000012 HC RECOVERY PHASE 2, PER MINUTE: Performed by: STUDENT IN AN ORGANIZED HEALTH CARE EDUCATION/TRAINING PROGRAM

## 2024-08-14 PROCEDURE — 250N000011 HC RX IP 250 OP 636: Performed by: NURSE ANESTHETIST, CERTIFIED REGISTERED

## 2024-08-14 PROCEDURE — 258N000003 HC RX IP 258 OP 636: Performed by: NURSE ANESTHETIST, CERTIFIED REGISTERED

## 2024-08-14 PROCEDURE — 360N000075 HC SURGERY LEVEL 2, PER MIN: Performed by: STUDENT IN AN ORGANIZED HEALTH CARE EDUCATION/TRAINING PROGRAM

## 2024-08-14 RX ORDER — HEPARIN SODIUM 5000 [USP'U]/.5ML
5000 INJECTION, SOLUTION INTRAVENOUS; SUBCUTANEOUS
Status: DISCONTINUED | OUTPATIENT
Start: 2024-08-14 | End: 2024-08-14 | Stop reason: HOSPADM

## 2024-08-14 RX ORDER — DEXAMETHASONE SODIUM PHOSPHATE 4 MG/ML
INJECTION, SOLUTION INTRA-ARTICULAR; INTRALESIONAL; INTRAMUSCULAR; INTRAVENOUS; SOFT TISSUE PRN
Status: DISCONTINUED | OUTPATIENT
Start: 2024-08-14 | End: 2024-08-14

## 2024-08-14 RX ORDER — BUPIVACAINE HYDROCHLORIDE 2.5 MG/ML
INJECTION, SOLUTION EPIDURAL; INFILTRATION; INTRACAUDAL PRN
Status: DISCONTINUED | OUTPATIENT
Start: 2024-08-14 | End: 2024-08-14 | Stop reason: HOSPADM

## 2024-08-14 RX ORDER — ONDANSETRON 2 MG/ML
4 INJECTION INTRAMUSCULAR; INTRAVENOUS EVERY 30 MIN PRN
Status: DISCONTINUED | OUTPATIENT
Start: 2024-08-14 | End: 2024-08-14 | Stop reason: HOSPADM

## 2024-08-14 RX ORDER — ACETAMINOPHEN 325 MG/1
975 TABLET ORAL ONCE
Status: COMPLETED | OUTPATIENT
Start: 2024-08-14 | End: 2024-08-14

## 2024-08-14 RX ORDER — KETAMINE HYDROCHLORIDE 10 MG/ML
INJECTION INTRAMUSCULAR; INTRAVENOUS PRN
Status: DISCONTINUED | OUTPATIENT
Start: 2024-08-14 | End: 2024-08-14

## 2024-08-14 RX ORDER — LIDOCAINE HYDROCHLORIDE AND EPINEPHRINE 10; 10 MG/ML; UG/ML
INJECTION, SOLUTION INFILTRATION; PERINEURAL PRN
Status: DISCONTINUED | OUTPATIENT
Start: 2024-08-14 | End: 2024-08-14 | Stop reason: HOSPADM

## 2024-08-14 RX ORDER — PROPOFOL 10 MG/ML
INJECTION, EMULSION INTRAVENOUS CONTINUOUS PRN
Status: DISCONTINUED | OUTPATIENT
Start: 2024-08-14 | End: 2024-08-14

## 2024-08-14 RX ORDER — LIDOCAINE 40 MG/G
CREAM TOPICAL
Status: DISCONTINUED | OUTPATIENT
Start: 2024-08-14 | End: 2024-08-14 | Stop reason: HOSPADM

## 2024-08-14 RX ORDER — CEFAZOLIN SODIUM/WATER 2 G/20 ML
2 SYRINGE (ML) INTRAVENOUS
Status: DISCONTINUED | OUTPATIENT
Start: 2024-08-14 | End: 2024-08-14 | Stop reason: HOSPADM

## 2024-08-14 RX ORDER — ACETAMINOPHEN 325 MG/1
650 TABLET ORAL
Status: DISCONTINUED | OUTPATIENT
Start: 2024-08-14 | End: 2024-08-14 | Stop reason: HOSPADM

## 2024-08-14 RX ORDER — OXYCODONE HYDROCHLORIDE 5 MG/1
5 TABLET ORAL
Status: DISCONTINUED | OUTPATIENT
Start: 2024-08-14 | End: 2024-08-14 | Stop reason: HOSPADM

## 2024-08-14 RX ORDER — OXYCODONE HYDROCHLORIDE 5 MG/1
10 TABLET ORAL
Status: DISCONTINUED | OUTPATIENT
Start: 2024-08-14 | End: 2024-08-14 | Stop reason: HOSPADM

## 2024-08-14 RX ORDER — DEXAMETHASONE SODIUM PHOSPHATE 4 MG/ML
4 INJECTION, SOLUTION INTRA-ARTICULAR; INTRALESIONAL; INTRAMUSCULAR; INTRAVENOUS; SOFT TISSUE
Status: DISCONTINUED | OUTPATIENT
Start: 2024-08-14 | End: 2024-08-14 | Stop reason: HOSPADM

## 2024-08-14 RX ORDER — NALOXONE HYDROCHLORIDE 0.4 MG/ML
0.1 INJECTION, SOLUTION INTRAMUSCULAR; INTRAVENOUS; SUBCUTANEOUS
Status: DISCONTINUED | OUTPATIENT
Start: 2024-08-14 | End: 2024-08-14 | Stop reason: HOSPADM

## 2024-08-14 RX ORDER — ACETAMINOPHEN 325 MG/1
975 TABLET ORAL ONCE
Status: DISCONTINUED | OUTPATIENT
Start: 2024-08-14 | End: 2024-08-14

## 2024-08-14 RX ORDER — SODIUM CHLORIDE, SODIUM LACTATE, POTASSIUM CHLORIDE, CALCIUM CHLORIDE 600; 310; 30; 20 MG/100ML; MG/100ML; MG/100ML; MG/100ML
INJECTION, SOLUTION INTRAVENOUS CONTINUOUS
Status: DISCONTINUED | OUTPATIENT
Start: 2024-08-14 | End: 2024-08-14 | Stop reason: HOSPADM

## 2024-08-14 RX ORDER — KETOROLAC TROMETHAMINE 30 MG/ML
INJECTION, SOLUTION INTRAMUSCULAR; INTRAVENOUS PRN
Status: DISCONTINUED | OUTPATIENT
Start: 2024-08-14 | End: 2024-08-14

## 2024-08-14 RX ORDER — GABAPENTIN 300 MG/1
300 CAPSULE ORAL
Status: COMPLETED | OUTPATIENT
Start: 2024-08-14 | End: 2024-08-14

## 2024-08-14 RX ORDER — ACETAMINOPHEN 325 MG/1
650 TABLET ORAL EVERY 4 HOURS PRN
Qty: 50 TABLET | Refills: 0 | Status: SHIPPED | OUTPATIENT
Start: 2024-08-14

## 2024-08-14 RX ORDER — ONDANSETRON 4 MG/1
4 TABLET, ORALLY DISINTEGRATING ORAL EVERY 30 MIN PRN
Status: DISCONTINUED | OUTPATIENT
Start: 2024-08-14 | End: 2024-08-14 | Stop reason: HOSPADM

## 2024-08-14 RX ORDER — ONDANSETRON 2 MG/ML
INJECTION INTRAMUSCULAR; INTRAVENOUS PRN
Status: DISCONTINUED | OUTPATIENT
Start: 2024-08-14 | End: 2024-08-14

## 2024-08-14 RX ORDER — FENTANYL CITRATE 50 UG/ML
25 INJECTION, SOLUTION INTRAMUSCULAR; INTRAVENOUS
Status: DISCONTINUED | OUTPATIENT
Start: 2024-08-14 | End: 2024-08-14 | Stop reason: HOSPADM

## 2024-08-14 RX ORDER — CEFAZOLIN SODIUM/WATER 2 G/20 ML
2 SYRINGE (ML) INTRAVENOUS SEE ADMIN INSTRUCTIONS
Status: DISCONTINUED | OUTPATIENT
Start: 2024-08-14 | End: 2024-08-14 | Stop reason: HOSPADM

## 2024-08-14 RX ADMIN — ACETAMINOPHEN 975 MG: 325 TABLET, FILM COATED ORAL at 11:08

## 2024-08-14 RX ADMIN — KETOROLAC TROMETHAMINE 15 MG: 30 INJECTION, SOLUTION INTRAMUSCULAR at 13:31

## 2024-08-14 RX ADMIN — KETAMINE HYDROCHLORIDE 25 MG: 10 INJECTION INTRAMUSCULAR; INTRAVENOUS at 12:56

## 2024-08-14 RX ADMIN — ONDANSETRON 4 MG: 2 INJECTION INTRAMUSCULAR; INTRAVENOUS at 12:59

## 2024-08-14 RX ADMIN — MIDAZOLAM 2 MG: 1 INJECTION INTRAMUSCULAR; INTRAVENOUS at 12:55

## 2024-08-14 RX ADMIN — GABAPENTIN 300 MG: 300 CAPSULE ORAL at 11:08

## 2024-08-14 RX ADMIN — Medication 2 G: at 12:48

## 2024-08-14 RX ADMIN — KETAMINE HYDROCHLORIDE 25 MG: 10 INJECTION INTRAMUSCULAR; INTRAVENOUS at 13:01

## 2024-08-14 RX ADMIN — PROPOFOL 150 MCG/KG/MIN: 10 INJECTION, EMULSION INTRAVENOUS at 12:59

## 2024-08-14 RX ADMIN — SODIUM CHLORIDE, POTASSIUM CHLORIDE, SODIUM LACTATE AND CALCIUM CHLORIDE: 600; 310; 30; 20 INJECTION, SOLUTION INTRAVENOUS at 11:24

## 2024-08-14 RX ADMIN — DEXAMETHASONE SODIUM PHOSPHATE 4 MG: 4 INJECTION, SOLUTION INTRA-ARTICULAR; INTRALESIONAL; INTRAMUSCULAR; INTRAVENOUS; SOFT TISSUE at 12:59

## 2024-08-14 ASSESSMENT — ACTIVITIES OF DAILY LIVING (ADL)
ADLS_ACUITY_SCORE: 35
ADLS_ACUITY_SCORE: 33

## 2024-08-14 NOTE — ANESTHESIA POSTPROCEDURE EVALUATION
Patient: Magaly Renee    Procedure: Procedure(s):  EXCISION, MASS, BACK       Anesthesia Type:  General    Note:  Disposition: Outpatient   Postop Pain Control: Uneventful            Sign Out: Well controlled pain   PONV: No   Neuro/Psych: Uneventful            Sign Out: Acceptable/Baseline neuro status   Airway/Respiratory: Uneventful            Sign Out: Acceptable/Baseline resp. status   CV/Hemodynamics: Uneventful            Sign Out: Acceptable CV status; No obvious hypovolemia; No obvious fluid overload   Other NRE: NONE   DID A NON-ROUTINE EVENT OCCUR? No           Last vitals:  Vitals Value Taken Time   /62 08/14/24 1400   Temp 36.4  C (97.6  F) 08/14/24 1335   Pulse 82 08/14/24 1400   Resp 16 08/14/24 1335   SpO2 95 % 08/14/24 1410   Vitals shown include unfiled device data.    Electronically Signed By: Alireza Frost CRNA, APRN CRNA  August 14, 2024  2:43 PM

## 2024-08-14 NOTE — INTERVAL H&P NOTE
"I have reviewed the surgical (or preoperative) H&P that is linked to this encounter, and examined the patient. There are no significant changes    Clinical Conditions Present on Arrival:  Clinically Significant Risk Factors Present on Admission                      # Overweight: Estimated body mass index is 25.6 kg/m  as calculated from the following:    Height as of this encounter: 1.689 m (5' 6.5\").    Weight as of this encounter: 73 kg (161 lb).       "

## 2024-08-14 NOTE — OP NOTE
OPERATIVE REPORT    Date of Service: 08/14/24     Procedures Performed  Back mass excision    Indications:  Magaly Renee is a 63 year old female who was seen in surgery clinic for evaluation of a symptomatic sebaceous cyst on her back. The patient now presents for excisional biopsy after discussing risks, benefits and therapeutic alternatives.       Pre-operative Diagnosis: sebaceous cyst    Post-operative Diagnosis: same    Surgeon(s):  Surgeons and Role:     * Brian Valerio MD - Primary     * Trina Yanes PA-C - Assisting - expert assistance was necessary for exposure and retraction during the procedure    Anesthesia: MAC     Procedure Details  The patient was seen in the preoperative area. The risks, benefits, indications, potential complications, treatment options, and expected outcomes were discussed with the patient. The possibilities of reaction to medication, bleeding, infection, the need for additional procedures, failure to diagnose a condition, and creating a complication requiring transfusion or further operation were discussed with the patient. The patient concurred with the proposed plan, giving informed consent. The site of surgery was properly noted/marked. The patient was taken to the operating room, identified as Magaly Renee and the procedure was verified as back mass excision. The patient was placed in the supine position. The patient was put under MAC anesthesia. She was positioned prone. The back was prepped and draped in the usual sterile fashion. A Time Out was held and the above information confirmed.     Next, local anesthetic was injected in the skin surrounding the mass. This same injection solution was used for additional local pain control as needed throughout the operation. A 2.5 x 1.0 cm elliptical incision was then made sharply on the skin surrounding the center of the mass and taken down further sharply. The mass was then encountered and resembled a sebaceous cyst.  "It was excised with blunt dissection and electrocautery around the edges. The approximately 2.5 x 1.0 x 0.6 cm mass was placed in a specimen jar labeled \"back mass\" and sent for pathology. After excision the wound was irrigated and hemostasis was obtained. The dermis was approximated with simple interuppted #3-0 Vicryl sutures. The skin was then closed with running subcuticular #4-0 Monocryl. Dermabond was placed over the incision. Instrument, sponge, and needle counts were correct at closure and at the conclusion of the case. Patient transferred to the PACU in stable condition.    Findings: sebaceous cyst on back    Estimated Blood Loss:  2 ml        Drains: None        Specimens: back mass    Complications:  None        Disposition: PACU        Condition: stable    Brian Valerio MD              "

## 2024-08-14 NOTE — ANESTHESIA CARE TRANSFER NOTE
Patient: Magaly Renee    Procedure: Procedure(s):  EXCISION, MASS, BACK       Diagnosis: Sebaceous cyst [L72.3]  Diagnosis Additional Information: No value filed.    Anesthesia Type:   General     Note:    Oropharynx: oropharynx clear of all foreign objects and spontaneously breathing  Level of Consciousness: awake  Oxygen Supplementation: nasal cannula  Level of Supplemental Oxygen (L/min / FiO2): 2  Independent Airway: airway patency satisfactory and stable  Dentition: dentition unchanged  Vital Signs Stable: post-procedure vital signs reviewed and stable    Patient transferred to: Phase II    Handoff Report: Identifed the Patient, Identified the Reponsible Provider, Reviewed the pertinent medical history, Discussed the surgical course, Reviewed Intra-OP anesthesia mangement and issues during anesthesia, Set expectations for post-procedure period and Allowed opportunity for questions and acknowledgement of understanding    Vitals:  Vitals Value Taken Time   BP     Temp     Pulse     Resp     SpO2         Electronically Signed By: Alireza Frost CRNA, APRN CRNA  August 14, 2024  1:36 PM

## 2024-08-14 NOTE — DISCHARGE INSTRUCTIONS
Same Day Surgery Discharge Instructions  Special Precautions After Surgery - Adult    It is not unusual to feel lightheaded or faint, up to 24 hours after surgery or while taking pain medication.  If you have these symptoms; sit for a few minutes before standing and have someone assist you when getting up.  You should rest and relax for the next 24 hours and must have someone stay with you for at least 24 hours after your discharge.  DO NOT DRIVE any vehicle or operate mechanical equipment for 24 hours following the end of your surgery.  DO NOT DRIVE while taking narcotic pain medications that have been prescribed by your physician.  If you had a limb operated on, you must be able to use it fully to drive.  DO NOT drink alcoholic beverages for 24 hours following surgery or while taking prescription pain medication.  Drink clear liquids (apple juice, ginger ale, broth, 7-Up, etc.).  Progress to your regular diet as you feel able.  Any questions call your physician and do not make important decisions for 24 hours.                Medications:  Acetaminophen (Tylenol):  Next dose: 5 pm.  Ibuprofen (Motrin, Advil):  Next dose: 7:30pm.  Follow the instructions on the bottle.       __________________________________________________________________________________________________________________________________  IMPORTANT NUMBERS:    [facility] [place] [facility] [place]:  [phone], [phone]  Pharmacy:  [phone]  Same Day Surgery:  [phone], Monday - Friday until 8:30 p.m.    Sur[facility]ty Clinic:  [phone]

## 2024-08-19 LAB
PATH REPORT.COMMENTS IMP SPEC: NORMAL
PATH REPORT.COMMENTS IMP SPEC: NORMAL
PATH REPORT.FINAL DX SPEC: NORMAL
PATH REPORT.GROSS SPEC: NORMAL
PATH REPORT.MICROSCOPIC SPEC OTHER STN: NORMAL
PATH REPORT.RELEVANT HX SPEC: NORMAL
PHOTO IMAGE: NORMAL

## 2024-08-19 PROCEDURE — 88304 TISSUE EXAM BY PATHOLOGIST: CPT | Mod: 26 | Performed by: PATHOLOGY

## 2024-08-20 DIAGNOSIS — G40.319 INTRACTABLE GENERALIZED EPILEPSY (H): ICD-10-CM

## 2024-08-22 ENCOUNTER — VIRTUAL VISIT (OUTPATIENT)
Dept: NEUROLOGY | Facility: CLINIC | Age: 64
End: 2024-08-22
Payer: COMMERCIAL

## 2024-08-22 DIAGNOSIS — G40.319 INTRACTABLE GENERALIZED EPILEPSY (H): ICD-10-CM

## 2024-08-22 DIAGNOSIS — G40.409 OTHER GENERALIZED EPILEPSY, NOT INTRACTABLE, WITHOUT STATUS EPILEPTICUS (H): Primary | ICD-10-CM

## 2024-08-22 RX ORDER — LEVETIRACETAM 1000 MG/1
2000 TABLET ORAL 2 TIMES DAILY
Qty: 120 TABLET | Refills: 1 | Status: SHIPPED | OUTPATIENT
Start: 2024-08-22

## 2024-08-22 RX ORDER — LAMOTRIGINE 100 MG/1
TABLET ORAL
Qty: 360 TABLET | Refills: 3 | OUTPATIENT
Start: 2024-08-22

## 2024-08-22 RX ORDER — LAMOTRIGINE 100 MG/1
TABLET ORAL
Qty: 450 TABLET | Refills: 3 | Status: SHIPPED | OUTPATIENT
Start: 2024-08-22

## 2024-08-22 NOTE — PROGRESS NOTES
Is the patient currently in the state of [place]? Yes   If the video visit is dropped, the invitation should be resent by: Text to cell phone: [phone]  Will anyone else be joining the visit? Yes  patient .   How would you like to obtain your AVS? [facility]      EPILEPSY CLINIC VIDEO VISIT NOTE          Service Date: 2024     HISTORY: I spoke with Ms. Magaly Renee and her .  She is a 63-year-old, right-handed woman who returned for follow-up of idiopathic generalized epilepsy with absence seizures and generalized tonic-clonic seizures.       The patient reported that she has had no seizures, following the most recent visit to this clinic on 2023.       The most recent grand mal seizure occurred on 10/28/2022, following right hip arthroplasty on 10/27/2022.    The most recent absence seizure occurred approximately in .       She decided nt to adjust doses of the AEDs following the most recent GTC seizure, because she was traveling to see her sister and was concerned that sleep cycle changes together with AED dose changes might promote seizure occurrence.       She denied adverse effects of the AEDs, although she is tired at times during the day.       She has not had any recent migraine headaches.  She occasionally has mild aching headaches that rapidly resolve with one dose of an OTC analgesic.       PAST MEDICAL-SURGICAL HISTORY:    1.  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     2.  Chronic migraine headaches.   3.  Status post  section.   4.  Status post right hip arthroplasty.      MEDICATIONS:  Levetiracetam 2000 mg b.i.d., lamotrigine 200/300 mg daily, and other medications as per the electronic medical record.       VIDEO OBSERVATIONS:   The patient spoke with normal articulation, fluency and syntax, with normal comprehension of questions.    On command, the patient  moved the direction of gaze into all 4 quadrants conjugately and without nystagmus.   Facial movements were normal.    Tongue protruded on the midline.    The patient did not display any resting tremors or action tremors of the outstretched arms.  Limb movements were normal.       IMPRESSION:   The patient has not had seizures following an early postoperative GTC seizure in October 2022, following hip arthroplasty, while on the current AED regimen.       We previously discussed the possibility of gradually converting to lamotrigine monotherapy.  She now is seizure-free and has not had adverse AED effects.  She decided not to change the AED doses when this was dicussed previouslyl.  We will discuss this again, when she returns to clinic at the next visit.       She appeared to clearly understand that she is prohibited from operating a motor vehicle within 3 months following any seizure or other episode with sudden unconsciousness or inability to sit up, and that she is required to report any future such seizure to the DPS within 30 days after the event.       PLAN:   1)  Continue levetiracetam and lamotrigine at the current doses.   2)  Check levetiracetam and lamotrigine levels.    3)  Return in 6 months for in-person visit.     Video Conference via bMenu.   Video Start Time: 4:11 p.m.   Video Stop Time: 4:25 p.m.   Provider location: [facility]   Reported Patient Location: [place]     I spent 25 minutes in this patient care, with 14 minutes in direct patient contact, and 11 minutes in chart review and document preparation on the day of the visit.      Yassine Eduardo M.D., Professor of Neurology

## 2024-08-22 NOTE — LETTER
2024       RE: Magaly Renee  : 1960   MRN: [id number]      Dear Colleague,    Thank you for referring your patient, Magaly Renee, to the [facility] EPILEPSY CARE at [facility]. Please see a copy of my visit note below.    Is the patient currently in the state of [place]? Yes   If the video visit is dropped, the invitation should be resent by: Text to cell phone: [phone]  Will anyone else be joining the visit? Yes  patient .   How would you like to obtain your AVS? [facility]      EPILEPSY CLINIC VIDEO VISIT NOTE          Service Date: 2024     HISTORY: I spoke with Ms. Magaly Renee and her .  She is a 63-year-old, right-handed woman who returned for follow-up of idiopathic generalized epilepsy with absence seizures and generalized tonic-clonic seizures.       The patient reported that she has had no seizures, following the most recent visit to this clinic on 2023.       The most recent grand mal seizure occurred on 10/28/2022, following right hip arthroplasty on 10/27/2022.    The most recent absence seizure occurred approximately in 2013.       She decided nt to adjust doses of the AEDs following the most recent GTC seizure, because she was traveling to see her sister and was concerned that sleep cycle changes together with AED dose changes might promote seizure occurrence.       She denied adverse effects of the AEDs, although she is tired at times during the day.       She has not had any recent migraine headaches.  She occasionally has mild aching headaches that rapidly resolve with one dose of an OTC analgesic.       PAST MEDICAL-SURGICAL HISTORY:    1.  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     2.  Chronic migraine headaches.   3.  Status post  section.   4.  Status post right hip  arthroplasty.      MEDICATIONS:  Levetiracetam 2000 mg b.i.d., lamotrigine 200/300 mg daily, and other medications as per the electronic medical record.       VIDEO OBSERVATIONS:   The patient spoke with normal articulation, fluency and syntax, with normal comprehension of questions.    On command, the patient moved the direction of gaze into all 4 quadrants conjugately and without nystagmus.   Facial movements were normal.    Tongue protruded on the midline.    The patient did not display any resting tremors or action tremors of the outstretched arms.  Limb movements were normal.       IMPRESSION:   The patient has not had seizures following an early postoperative GTC seizure in October 2022, following hip arthroplasty, while on the current AED regimen.       We previously discussed the possibility of gradually converting to lamotrigine monotherapy.  She now is seizure-free and has not had adverse AED effects.  She decided not to change the AED doses when this was dicussed previouslyl.  We will discuss this again, when she returns to clinic at the next visit.       She appeared to clearly understand that she is prohibited from operating a motor vehicle within 3 months following any seizure or other episode with sudden unconsciousness or inability to sit up, and that she is required to report any future such seizure to the DPS within 30 days after the event.       PLAN:   1)  Continue levetiracetam and lamotrigine at the current doses.   2)  Check levetiracetam and lamotrigine levels.    3)  Return in 6 months for in-person visit.     Video Conference via [employer].   Video Start Time: 4:11 p.m.   Video Stop Time: 4:25 p.m.   Provider location: [facility]   Reported Patient Location: Home     I spent 25 minutes in this patient care, with 14 minutes in direct patient contact, and 11 minutes in chart review and document preparation on the day of the visit.      Yassine Eduardo M.D., Professor of Neurology         Again, thank you for allowing me to participate in the care of your patient.      Sincerely,    Yassine Eduardo MD

## 2024-09-23 ENCOUNTER — OFFICE VISIT (OUTPATIENT)
Dept: FAMILY MEDICINE | Facility: CLINIC | Age: 64
End: 2024-09-23
Payer: COMMERCIAL

## 2024-09-23 VITALS
HEIGHT: 67 IN | SYSTOLIC BLOOD PRESSURE: 110 MMHG | RESPIRATION RATE: 20 BRPM | BODY MASS INDEX: 25.39 KG/M2 | TEMPERATURE: 97.6 F | DIASTOLIC BLOOD PRESSURE: 70 MMHG | HEART RATE: 86 BPM | WEIGHT: 161.8 LBS | OXYGEN SATURATION: 94 %

## 2024-09-23 DIAGNOSIS — J01.90 ACUTE NON-RECURRENT SINUSITIS, UNSPECIFIED LOCATION: Primary | ICD-10-CM

## 2024-09-23 PROCEDURE — 99213 OFFICE O/P EST LOW 20 MIN: CPT | Performed by: PHYSICIAN ASSISTANT

## 2024-09-23 ASSESSMENT — ENCOUNTER SYMPTOMS
CHILLS: 0
RHINORRHEA: 1
SINUS PRESSURE: 1
HEADACHES: 0
CHEST TIGHTNESS: 0
SORE THROAT: 0
DIAPHORESIS: 0
SHORTNESS OF BREATH: 0
COUGH: 0
SINUS PAIN: 0
FATIGUE: 0
SLEEP DISTURBANCE: 0
FEVER: 0

## 2024-09-23 ASSESSMENT — PAIN SCALES - GENERAL: PAINLEVEL: NO PAIN (0)

## 2024-09-23 NOTE — PATIENT INSTRUCTIONS
Your symptoms are concerning for a bacterial sinus infection.  For treatment you have been prescribed Augmentin, an antibiotic.  With antibiotic treatment, sinus infections typically improve/resolve over approximately 10 days.  For additional relief you can use DayQuil and Ibuprofen. When flying, you can use over the counter Afrin to open up the sinuses temporarily.  Make sure to get plenty of rest and stay hydrated.  Reach out with questions or concerns and follow-up in clinic for new or worsening symptoms.

## 2024-09-23 NOTE — PROGRESS NOTES
Assessment & Plan     Acute non-recurrent sinusitis, unspecified location  Patient is a 63-year-old female who presents to clinic due to 2 weeks of ongoing nasal congestion, rhinorrhea, sinus pressure.  Vital signs normal.  Physical exam significant for nasal congestion and rhinorrhea.  Symptoms are most consistent with bacterial sinusitis likely caused by recent viral URI.  Augmentin prescribed.  Recommended continuing DayQuil, ibuprofen, and using OTC Afrin while traveling next weekend if needed.  Discussed expected course of recovery.  Follow precautions provided.  - amoxicillin-clavulanate (AUGMENTIN) 875-125 MG tablet; Take 1 tablet by mouth 2 times daily for 7 days.            See Patient Instructions    Subjective   Karen is a 63 year old, presenting for the following health issues:  Sick and Sinus Problem  Patient stated she has had this head cold for a week and nothing OTC has helped.       9/23/2024    10:04 AM   Additional Questions   Roomed by Louisa TERESA MA   Accompanied by No one         9/23/2024    10:04 AM   Patient Reported Additional Medications   Patient reports taking the following new medications None     Sinus Problem   Associated symptoms include congestion and sinus pressure. Pertinent negatives include no chills, no sore throat, no cough and no shortness of breath.        2 weeks ago head cold started with congestion, sinus pressure, headache   No fever   Treated with DayQuil/NyQuil, Mucinex, and Zyrtec  Negative COVID19 testing     Review of Systems   Constitutional:  Negative for chills, diaphoresis, fatigue and fever.   HENT:  Positive for congestion, rhinorrhea and sinus pressure. Negative for sinus pain and sore throat.    Respiratory:  Negative for cough, chest tightness and shortness of breath.    Cardiovascular:  Negative for chest pain.   Neurological:  Negative for headaches.   Psychiatric/Behavioral:  Negative for sleep disturbance.            Objective    /70   Pulse 86    "Temp 97.6  F (36.4  C) (Temporal)   Resp 20   Ht 1.689 m (5' 6.5\")   Wt 73.4 kg (161 lb 12.8 oz)   LMP 07/06/2016 (Exact Date)   SpO2 94%   Breastfeeding No   BMI 25.72 kg/m    Body mass index is 25.72 kg/m .  Physical Exam  Vitals and nursing note reviewed.   Constitutional:       General: She is not in acute distress.     Appearance: Normal appearance.   HENT:      Head: Normocephalic and atraumatic.      Nose: Congestion and rhinorrhea present.      Mouth/Throat:      Mouth: Mucous membranes are moist.      Pharynx: Oropharynx is clear. No oropharyngeal exudate or posterior oropharyngeal erythema.   Eyes:      Extraocular Movements: Extraocular movements intact.      Pupils: Pupils are equal, round, and reactive to light.   Cardiovascular:      Rate and Rhythm: Normal rate and regular rhythm.      Heart sounds: Normal heart sounds.   Pulmonary:      Effort: Pulmonary effort is normal.      Breath sounds: Normal breath sounds. No wheezing, rhonchi or rales.   Musculoskeletal:         General: Normal range of motion.      Cervical back: Normal range of motion.   Lymphadenopathy:      Cervical: No cervical adenopathy.   Skin:     General: Skin is warm and dry.   Neurological:      General: No focal deficit present.      Mental Status: She is alert.   Psychiatric:         Mood and Affect: Mood normal.         Behavior: Behavior normal.                    Signed Electronically by: Carlee Rosales PA-C    "

## 2024-11-09 ENCOUNTER — HEALTH MAINTENANCE LETTER (OUTPATIENT)
Age: 64
End: 2024-11-09

## 2024-11-13 ENCOUNTER — TELEPHONE (OUTPATIENT)
Dept: NEUROLOGY | Facility: CLINIC | Age: 64
End: 2024-11-13

## 2024-11-13 NOTE — TELEPHONE ENCOUNTER
[facility] Call Center    Phone Message    May a detailed message be left on voicemail: yes     Reason for Call: Form or Letter   Type or form/letter needing completion: Request for Medical Statement  Provider: Dr. Eduardo  Date form needed: 11/24/24  Once completed: May provide information online, per bottom of form at [employer]    Karen will have her spouse fax this form in today.    Action Taken: Message routed to:  Other: [facility]    Travel Screening: Not Applicable     Date of Service:

## 2024-11-14 ENCOUNTER — MYC MEDICAL ADVICE (OUTPATIENT)
Dept: NEUROLOGY | Facility: CLINIC | Age: 64
End: 2024-11-14

## 2024-12-01 DIAGNOSIS — G40.409 OTHER GENERALIZED EPILEPSY, NOT INTRACTABLE, WITHOUT STATUS EPILEPTICUS (H): ICD-10-CM

## 2024-12-03 ENCOUNTER — TELEPHONE (OUTPATIENT)
Dept: NEUROLOGY | Facility: CLINIC | Age: 64
End: 2024-12-03

## 2024-12-03 DIAGNOSIS — G40.409 OTHER GENERALIZED EPILEPSY, NOT INTRACTABLE, WITHOUT STATUS EPILEPTICUS (H): ICD-10-CM

## 2024-12-03 RX ORDER — LEVETIRACETAM 1000 MG/1
2000 TABLET ORAL 2 TIMES DAILY
Qty: 360 TABLET | Refills: 3 | Status: SHIPPED | OUTPATIENT
Start: 2024-12-03

## 2024-12-03 NOTE — TELEPHONE ENCOUNTER
[facility] Call Center    Phone Message    May a detailed message be left on voicemail: yes     Reason for Call: Medication Question or concern regarding medication   Prescription Clarification  Name of Medication: levETIRAcetam (KEPPRA) 1000 MG tablet    Prescribing Provider:    Pharmacy: [facility]/PHARMACY [id number] [place] [id number]    What on the order needs clarification?  Patient ccalled states that she only have enough for tonight and will be out. Pharmacy is waiting for approval from provider to fill medications         Patient is also waiting for her DMV forms to be filled out, patient needs DMV form in before 12/14/2024, per pt form was sent to care team via [employer], please call to confirm that form has been received       Action Taken: mincep    Travel Screening: Not Applicable     Date of Service:

## 2024-12-03 NOTE — TELEPHONE ENCOUNTER
Last seen: 8/22/24  RTC: 6 mo  Cancel: no  No-show: no  Next appt: March 2025    Incoming refill from patient via phones    Medication requested: levETIRAcetam (KEPPRA) 1000 MG tablet   Directions: Take 2 tablets (2,000 mg) by mouth 2 times daily.   Qty: 360 + 3  Last refilled: Aug 2024    Medication refill approved per refill protocol

## 2024-12-03 NOTE — TELEPHONE ENCOUNTER
DMV form signed, faxed and mailed to DPS on 12/03/24, sent to scanning, and copy mailed to patient.

## 2024-12-03 NOTE — TELEPHONE ENCOUNTER
[facility] Call Center    Phone Message    May a detailed message be left on voicemail: yes     Reason for Call: Medication Refill Request    Has the patient contacted the pharmacy for the refill? Yes   Name of medication being requested: levETIRAcetam (KEPPRA) 1000 MG tablet   Provider who prescribed the medication: Dr Eduardo  Pharmacy: [facility]/PHARMACY [id number] [place] [phone]   Date medication is needed: 12/03/2024   Patient calling again to request refill be sent over today. Patient states she has one tablet left for tonight and will be out tomorrow, but does not want to wait until she is out to get the refill. Requesting RX be sent today.     Action Taken: Message routed to:  Other: [facility]    Travel Screening: Not Applicable

## 2024-12-04 NOTE — TELEPHONE ENCOUNTER
[facility] Call Center    Phone Message    May a detailed message be left on voicemail: yes     Reason for Call: Pt advised that she needs providers authorization to fill prescription.     Please review.    Action Taken: Other: Neurology    Travel Screening: Not Applicable

## 2024-12-04 NOTE — TELEPHONE ENCOUNTER
Call placed to pharmacy. Per pharmacy, they received order for Levetiracetam yesterday for only a 60 day supply (240 tablets) insurance will only cover for 90 days. Per EPIC records, 90 day supply (360 tablets) was sent in yesterday. Unsure where the change in quantity came to be. Writer gave pharmacist verbal order to change dispense quantity to be 90 day supply. No further questions.

## 2024-12-05 RX ORDER — LEVETIRACETAM 1000 MG/1
2000 TABLET ORAL 2 TIMES DAILY
Qty: 360 TABLET | Refills: 1 | OUTPATIENT
Start: 2024-12-05

## 2024-12-18 ENCOUNTER — PATIENT OUTREACH (OUTPATIENT)
Dept: CARE COORDINATION | Facility: CLINIC | Age: 64
End: 2024-12-18
Payer: COMMERCIAL

## 2025-03-19 ENCOUNTER — OFFICE VISIT (OUTPATIENT)
Dept: NEUROLOGY | Facility: CLINIC | Age: 65
End: 2025-03-19
Payer: COMMERCIAL

## 2025-03-19 VITALS
HEART RATE: 70 BPM | DIASTOLIC BLOOD PRESSURE: 75 MMHG | SYSTOLIC BLOOD PRESSURE: 120 MMHG | TEMPERATURE: 97.3 F | OXYGEN SATURATION: 98 %

## 2025-03-19 DIAGNOSIS — G40.409 OTHER GENERALIZED EPILEPSY, NOT INTRACTABLE, WITHOUT STATUS EPILEPTICUS (H): Primary | ICD-10-CM

## 2025-03-19 DIAGNOSIS — H47.49 DISORDER OF OPTIC CHIASM: ICD-10-CM

## 2025-03-19 DIAGNOSIS — G40.319 INTRACTABLE GENERALIZED EPILEPSY (H): ICD-10-CM

## 2025-03-19 PROCEDURE — 80053 COMPREHEN METABOLIC PANEL: CPT | Mod: ORL | Performed by: PSYCHIATRY & NEUROLOGY

## 2025-03-19 PROCEDURE — 80175 DRUG SCREEN QUAN LAMOTRIGINE: CPT | Mod: ORL | Performed by: PSYCHIATRY & NEUROLOGY

## 2025-03-19 PROCEDURE — 80177 DRUG SCRN QUAN LEVETIRACETAM: CPT | Mod: ORL | Performed by: PSYCHIATRY & NEUROLOGY

## 2025-03-19 RX ORDER — LAMOTRIGINE 100 MG/1
TABLET ORAL
Qty: 450 TABLET | Refills: 3 | Status: SHIPPED | OUTPATIENT
Start: 2025-03-19

## 2025-03-19 RX ORDER — LEVETIRACETAM 1000 MG/1
2000 TABLET ORAL 2 TIMES DAILY
Qty: 360 TABLET | Refills: 3 | Status: SHIPPED | OUTPATIENT
Start: 2025-03-19

## 2025-03-19 ASSESSMENT — PAIN SCALES - GENERAL: PAINLEVEL_OUTOF10: NO PAIN (0)

## 2025-03-19 NOTE — LETTER
3/19/2025       RE: Magaly Renee  : 1960   MRN: [id number]      Dear Colleague,    Thank you for referring your patient, Magaly Renee, to the [facility] at [facility]. Please see a copy of my visit note below.      [facility]:  RETURN VISIT           Service Date: 2025     HISTORY:  Ms. Magaly Renee is a 64-year-old, right-handed woman who returned for follow-up of idiopathic generalized epilepsy with absence seizures and generalized tonic-clonic seizures.  She came to the visit today with her .       The patient reported that she has had no seizures, following the most recent visit to this clinic on 2024.       The most recent grand mal seizure occurred on 10/28/2022, following right hip arthroplasty on 10/27/2022.    The most recent absence seizure occurred approximately in .       She decided not to adjust doses of the AEDs following the most recent GTC seizure, because she was traveling to see her sister and was concerned that sleep cycle changes together with AED dose changes might promote seizure occurrence.       She denied adverse effects of the AEDs, although she is tired at times during the day.       She has not had any recent migraine headaches.  She occasionally has mild aching headaches that rapidly resolve with one dose of an OTC analgesic.       Ictal semiology-history:   The patient reported that she only ever had 2 types of seizures in her lifetime, as grand mal seizures and absence seizures.      The grand mal seizures first occurred at 14 years of age.  She thinks that she probably had 8-12 total grand mal seizures widely spread out over the years, usually with 3-year intervals or longer between seizures.  The most recent of these have occurred in 2016.  These could arise from sleep or waking.  When awake, the patient never had any sort of aura or prodrome, but simply  would find herself postictally down on the floor or ground with standing at onset with a severe headache, confusion and occasionally evidence of tongue biting or urinary incontinence.  Her  saw several of these events that arose from sleep in bed at night, described as rigid trunk and leg extension with arm flexion up to the chest and rhythmic generalized jerking with complete unresponsiveness, lasting about 1 minute.  He often noted confused postictal behaviors for 15 minutes or longer after a seizure.      The absence seizures probably began sometime in the early teens.  She often had clusters with multiple absences on the same day, but with many days or even months between seizures, as recently as a decade ago.  The episodes since have declined in frequency and the last of these was witnessed, perhaps 5 years ago.  The patient herself would usually have no idea that one had occurred.  She never had any aura or postictal state with these.  Witnesses would simply note that she suddenly stopped talking and stopped moving, with no response to voice or touch, but with eyes open.  She would not fall down or change posture during these brief seizures.  Her  thought that these consistently lasted less than 20 seconds, and he did not witness any postictal state.      The patient denied that she has ever had a paroxysmal episode of sudden brief confusion, non-convulsive falling with unconsciousness, repetitive involuntary movements or posturing, or other paroxysmal phenomena.  She denied any history of convulsive status epilepticus, or complex partial status epilepticus.  She denied any history of sudden involuntary jerks while fully awake, but she has had hypnic jerks.       The patient does not recognize exacerbating or remitting factors with regard to seizure frequency.      Epilepsy-seizure predispositions:   The patient has no family history of epilepsy or seizures.  She has no history of gestational or   injury, febrile convulsions, developmental delay, stroke, meningitis, encephalitis, significant head injury, or other epileptic predispositions.  She denied a history of physical or sexual abuse by an adult during her childhood or adulthood.       Laboratory evaluations:   Scanned records from [facility] indicate that a normal brain MRI was obtained on 2001.    Multiple interictal EEG recordings were interpreted as showing generalized spike wave discharges, but sometimes also as showing left parietal or other left-sided focal spike-wave complexes, with the earliest EEG report dating to 2001 at [facility].       The patient [place] [facility] [place] showed a burst of approximately 3-per-second spike-wave discharges which was atypical due to variable absence of spikes in the presence of high amplitude slow waves and which also showed a single focal left centroparietotemporal spike-wave complex.      A brain MRI detected no cerebral abnormalities, but showed focal enlargement of the left optic chiasm without associated enhancement, [facility] on 2018.     Epilepsy therapeutics:   The patient remembers that initially after her first grand mal seizure, she was started directly on phenytoin and phenobarbital, which she found highly sedating.  Over the years she was converted to various combinations of clonazepam, carbamazepine, lamotrigine and levetiracetam.  For the last approximately 10 years, she has been on a combination of levetiracetam, lamotrigine and carbamazepine with intermittent periods of blurry or double vision lasting several hours.  These improved when she began to take her medications with meals, but she still has a severe episode at least once per month.      Other history:   The patient began having migraine headaches in her 30S, which she usually were left retro-orbital or right retro-orbital in alternation.  These  were not associated with nausea, vomiting, photophobia or phonophobia.  They might last many hours if untreated, but they are markedly aborted with the use of Fioricet.  They have improved over the years and currently are occurring about once per month or less.      PAST MEDICAL-SURGICAL HISTORY:    1.  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     2.  Chronic migraine headaches.   3.  Status post  section.      FAMILY HISTORY:  There is no family history of seizures or epilepsy.  Her mother had recurrent migraine headaches, but there is no other history of neurologic disease in the family.        PERSONAL AND SOCIAL HISTORY:  The patient grew up in [place].  She completed her education with a bachelor's degree in mathematics at [employer].  She lives with her  and the younger 2 of their 3 sons.  She previously worked in business, primarily accounting.     She does not use alcohol, tobacco or illicit recreational drugs        REVIEW OF SYSTEMS:  The patient denied history of attention and memory disturbance, difficulties with comprehension and expression, difficulty in solving problems, weakness, tremors or other abnormal involuntary movements, numbness or tingling, incoordination, falling or difficulty in walking, urinary or fecal incontinence, headache and other pain, except as described above.  The patient denied any history of severe depression or suicidal ideation, severe anxiety, panic attacks, hallucinations, delusions, psychosis, substance abuse, or psychiatric hospitalization.  She denied rashes and easy bruising.  The remainder of a 14-system symptom review was negative except as noted above.        MEDICATIONS:  Levetiracetam 2000 mg b.i.d., lamotrigine 200/300 mg daily, and other medications as per the electronic medical record.      PHYSICAL EXAMINATION:    On examination, the  patient was an alert woman in no apparent distress.  Vitals signs were as per the electronic medical record.  Skull was normocephalic and atraumatic.  Neck was supple, without signs of meningeal irritation.       On neurological examination the patient appeared alert and was fully oriented to person, place, time, and reason for visit.  Speech showed normal articulation, fluency, repetitions, naming, syntax and comprehension.  She accurately repeated digits sequences, repeating 8 forward and 5 reversed.  At 10 minutes from presentation, 4 of 4 phrases were recalled.  No apraxias or atavistic signs were elicited.  Fundoscopy was normal bilaterally.  Cranial nerves III through XII were normal.  Muscle masses, tones and strengths were normal throughout.  There was no pronator drift.  Sequential fine finger movements were performed normally with each hand.  No spontaneous tremors, myoclonus, or other abnormal movements were observed.  Sensations of light touch, pin prick, vibration and proprioception were reportedly normal throughout.  The rapid alternating movements, and finger-nose-finger and heel-shin maneuvers were performed normally bilaterally.  Romberg maneuver was negative.  Regular, heel, toe, tandem and reverse tandem walking were normal.  Deep tendon reflexes were normal and symmetric throughout.  Toes were downgoing bilaterally.       IMPRESSION:    The patient has maintained seizure control for a number of years.  She denied adverse effects currently, but in the past she has had AEs that are more characteristic of carbamazepine and lamotrigine AEs than of levetiracetam.  Her epilepsy syndrome generally is least responsive to carbamazepine, among the 3 agents.  Although she has been cautious about reducing AEDs, she may consider tapering off carbamazepine, if levels of the other two AEDs are adequate, and if the EEG shows suppression of epileptiform abnormalities.       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  She saw Dr. Luke Molina in Neurosurgery, who recommended follow up with serial imaging.  The non-enhancing abnormality was reported to be unchanged on an MRI on 03/26/2019.       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  She strongly denied any history of myoclonus.  In addition to generalized spike-wave discharges, several electroencephalograms have shown left parietal or other left hemispheric spike-wave complexes.  Possibly she has atypical interictal electroencephalographic abnormalities and idiopathic generalized epilepsy, although another possibility would be a partial epilepsy with secondary bilateral synchrony, causing complex partial and secondarily generalized tonic-clonic seizures.  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       The patient has good migraine control, which she has followed with Dr. Shayy Sands.        The patient herself noted that she would not drive for 3 months if seizures recurred.  I reviewed [place] regulations on seizures and driving with the patient.  She appeared to clearly understand that she is prohibited from operating a motor vehicle within 3 months following any seizure or other episode with sudden unconsciousness or inability to sit up, and that she is required to report any future such seizure to the [facility] within 30 days after the event.  I also recommended that she and her family review all of her other activities, and avoid any activities that might lead to self-injury or injury of others, within 3 months following any seizure with impaired awareness or impaired motor  control.  Such activities include but are not limited to holding babies or young children at heights from which they might be injured if dropped, bathing infants or young children in situations in which they might drown without continuous interactive care by an adult who is fully capable at all times during the bath, operating power cutting or other tools, handling firearms, exposure to heights from which she might fall, exposure to vessels with hot cooking oil or water, and tub-bathing or swimming alone.       PLAN:    1.  Check lamotrigine and levetiracetam levels today.     2.  Continue levetiracetam 3000 mg b.i.d.  3.  Continue lamotrigine 100/200 mg daily.    4.  Brain 3T MRI.   5.  Out-pt 3hr VEEG.   6.  Return to clinic in approximately 5 months.     I spent 46 minutes in this patient care, with 34 minutes in direct patient contact, and 12 minutes in chart review and document preparation on the day of the visit.         Yassine Eduardo M.D.   Professor of Neurology       Again, thank you for allowing me to participate in the care of your patient.      Sincerely,    Yassine Eduardo MD

## 2025-03-19 NOTE — PROGRESS NOTES
[facility]:  RETURN VISIT           Service Date: 03/19/2025     HISTORY:  Ms. Magaly Renee is a 64-year-old, right-handed woman who returned for follow-up of idiopathic generalized epilepsy with absence seizures and generalized tonic-clonic seizures.  She came to the visit today with her .       The patient reported that she has had no seizures, following the most recent visit to this clinic on 08/22/2024.       The most recent grand mal seizure occurred on 10/28/2022, following right hip arthroplasty on 10/27/2022.    The most recent absence seizure occurred approximately in 2013.       She decided not to adjust doses of the AEDs following the most recent GTC seizure, because she was traveling to see her sister and was concerned that sleep cycle changes together with AED dose changes might promote seizure occurrence.       She denied adverse effects of the AEDs, although she is tired at times during the day.       She has not had any recent migraine headaches.  She occasionally has mild aching headaches that rapidly resolve with one dose of an OTC analgesic.       Ictal semiology-history:   The patient reported that she only ever had 2 types of seizures in her lifetime, as grand mal seizures and absence seizures.      The grand mal seizures first occurred at 14 years of age.  She thinks that she probably had 8-12 total grand mal seizures widely spread out over the years, usually with 3-year intervals or longer between seizures.  The most recent of these have occurred in 2016.  These could arise from sleep or waking.  When awake, the patient never had any sort of aura or prodrome, but simply would find herself postictally down on the floor or ground with standing at onset with a severe headache, confusion and occasionally evidence of tongue biting or urinary incontinence.  Her  saw several of these events that arose from sleep in bed at night, described as rigid trunk and leg  extension with arm flexion up to the chest and rhythmic generalized jerking with complete unresponsiveness, lasting about 1 minute.  He often noted confused postictal behaviors for 15 minutes or longer after a seizure.      The absence seizures probably began sometime in the early teens.  She often had clusters with multiple absences on the same day, but with many days or even months between seizures, as recently as a decade ago.  The episodes since have declined in frequency and the last of these was witnessed, perhaps 5 years ago.  The patient herself would usually have no idea that one had occurred.  She never had any aura or postictal state with these.  Witnesses would simply note that she suddenly stopped talking and stopped moving, with no response to voice or touch, but with eyes open.  She would not fall down or change posture during these brief seizures.  Her  thought that these consistently lasted less than 20 seconds, and he did not witness any postictal state.      The patient denied that she has ever had a paroxysmal episode of sudden brief confusion, non-convulsive falling with unconsciousness, repetitive involuntary movements or posturing, or other paroxysmal phenomena.  She denied any history of convulsive status epilepticus, or complex partial status epilepticus.  She denied any history of sudden involuntary jerks while fully awake, but she has had hypnic jerks.       The patient does not recognize exacerbating or remitting factors with regard to seizure frequency.      Epilepsy-seizure predispositions:   The patient has no family history of epilepsy or seizures.  She has no history of gestational or  injury, febrile convulsions, developmental delay, stroke, meningitis, encephalitis, significant head injury, or other epileptic predispositions.  She denied a history of physical or sexual abuse by an adult during her childhood or adulthood.       Laboratory evaluations:   Scanned  records from [facility] indicate that a normal brain MRI was obtained on 05/25/2001.    Multiple interictal EEG recordings were interpreted as showing generalized spike wave discharges, but sometimes also as showing left parietal or other left-sided focal spike-wave complexes, with the earliest EEG report dating to 03/08/2001 at [facility].       The patient had a prolonged outpatient video EEG on 12/11/2018, at the [facility], which showed a burst of approximately 3-per-second spike-wave discharges which was atypical due to variable absence of spikes in the presence of high amplitude slow waves and which also showed a single focal left centroparietotemporal spike-wave complex.      A brain MRI detected no cerebral abnormalities, but showed focal enlargement of the left optic chiasm without associated enhancement, at [facility] on 12/18/2018.     Epilepsy therapeutics:   The patient remembers that initially after her first grand mal seizure, she was started directly on phenytoin and phenobarbital, which she found highly sedating.  Over the years she was converted to various combinations of clonazepam, carbamazepine, lamotrigine and levetiracetam.  For the last approximately 10 years, she has been on a combination of levetiracetam, lamotrigine and carbamazepine with intermittent periods of blurry or double vision lasting several hours.  These improved when she began to take her medications with meals, but she still has a severe episode at least once per month.      Other history:   The patient began having migraine headaches in her 30S, which she usually were left retro-orbital or right retro-orbital in alternation.  These were not associated with nausea, vomiting, photophobia or phonophobia.  They might last many hours if untreated, but they are markedly aborted with the use of Fioricet.  They have improved over the years and currently are occurring about once per month or less.      PAST MEDICAL-SURGICAL  HISTORY:    1.  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     2.  Chronic migraine headaches.   3.  Status post  section.      FAMILY HISTORY:  There is no family history of seizures or epilepsy.  Her mother had recurrent migraine headaches, but there is no other history of neurologic disease in the family.        PERSONAL AND SOCIAL HISTORY:  The patient grew up in [place].  She completed her education with a bachelor's degree in mathematics at [employer].  She lives with her  and the younger 2 of their 3 sons.  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     She does not use alcohol, tobacco or illicit recreational drugs        REVIEW OF SYSTEMS:  The patient denied history of attention and memory disturbance, difficulties with comprehension and expression, difficulty in solving problems, weakness, tremors or other abnormal involuntary movements, numbness or tingling, incoordination, falling or difficulty in walking, urinary or fecal incontinence, headache and other pain, except as described above.  The patient denied any history of severe depression or suicidal ideation, severe anxiety, panic attacks, hallucinations, delusions, psychosis, substance abuse, or psychiatric hospitalization.  She denied rashes and easy bruising.  The remainder of a 14-system symptom review was negative except as noted above.        MEDICATIONS:  Levetiracetam 2000 mg b.i.d., lamotrigine 200/300 mg daily, and other medications as per the electronic medical record.      PHYSICAL EXAMINATION:    On examination, the patient was an alert woman in no apparent distress.  Vitals signs were as per the electronic medical record.  Skull was normocephalic and atraumatic.  Neck was supple, without signs of meningeal irritation.       On neurological examination the patient appeared alert and was fully oriented  to person, place, time, and reason for visit.  Speech showed normal articulation, fluency, repetitions, naming, syntax and comprehension.  She accurately repeated digits sequences, repeating 8 forward and 5 reversed.  At 10 minutes from presentation, 4 of 4 phrases were recalled.  No apraxias or atavistic signs were elicited.  Fundoscopy was normal bilaterally.  Cranial nerves III through XII were normal.  Muscle masses, tones and strengths were normal throughout.  There was no pronator drift.  Sequential fine finger movements were performed normally with each hand.  No spontaneous tremors, myoclonus, or other abnormal movements were observed.  Sensations of light touch, pin prick, vibration and proprioception were reportedly normal throughout.  The rapid alternating movements, and finger-nose-finger and heel-shin maneuvers were performed normally bilaterally.  Romberg maneuver was negative.  Regular, heel, toe, tandem and reverse tandem walking were normal.  Deep tendon reflexes were normal and symmetric throughout.  Toes were downgoing bilaterally.       IMPRESSION:    The patient has maintained seizure control for a number of years.  She denied adverse effects currently, but in the past she has had AEs that are more characteristic of carbamazepine and lamotrigine AEs than of levetiracetam.  Her epilepsy syndrome generally is least responsive to carbamazepine, among the 3 agents.  Although she has been cautious about reducing AEDs, she may consider tapering off carbamazepine, if levels of the other two AEDs are adequate, and if the EEG shows suppression of epileptiform abnormalities.       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  She saw Dr. Luke Molina in Neurosurgery, who recommended follow up with serial imaging.  The non-enhancing abnormality was reported to be unchanged on an MRI on  03/26/2019.       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  She strongly denied any history of myoclonus.  In addition to generalized spike-wave discharges, several electroencephalograms have shown left parietal or other left hemispheric spike-wave complexes.  Possibly she has atypical interictal electroencephalographic abnormalities and idiopathic generalized epilepsy, although another possibility would be a partial epilepsy with secondary bilateral synchrony, causing complex partial and secondarily generalized tonic-clonic seizures.  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       The patient has good migraine control, which she has followed with Dr. Shayy Sands.        The patient herself noted that she would not drive for 3 months if seizures recurred.  I reviewed [place] regulations on seizures and driving with the patient.  She appeared to clearly understand that she is prohibited from operating a motor vehicle within 3 months following any seizure or other episode with sudden unconsciousness or inability to sit up, and that she is required to report any future such seizure to the [facility] within 30 days after the event.  I also recommended that she and her family review all of her other activities, and avoid any activities that might lead to self-injury or injury of others, within 3 months following any seizure with impaired awareness or impaired motor control.  Such activities include but are not limited to holding babies or young children at heights from which they might be injured if dropped, bathing infants or young children in situations in which they might drown without continuous interactive care by an adult who is fully capable at all  times during the bath, operating power cutting or other tools, handling firearms, exposure to heights from which she might fall, exposure to vessels with hot cooking oil or water, and tub-bathing or swimming alone.       PLAN:    1.  Check lamotrigine and levetiracetam levels today.     2.  Continue levetiracetam 3000 mg b.i.d.  3.  Continue lamotrigine 100/200 mg daily.    4.  Brain 3T MRI.   5.  Out-pt 3hr VEEG.   6.  Return to clinic in approximately 5 months.     I spent 46 minutes in this patient care, with 34 minutes in direct patient contact, and 12 minutes in chart review and document preparation on the day of the visit.         Yassine Eduardo M.D.   Professor of Neurology

## 2025-03-20 LAB
ALBUMIN SERPL BCG-MCNC: 4.9 G/DL (ref 3.5–5.2)
ALP SERPL-CCNC: 75 U/L (ref 40–150)
ALT SERPL W P-5'-P-CCNC: 32 U/L (ref 0–50)
ANION GAP SERPL CALCULATED.3IONS-SCNC: 14 MMOL/L (ref 7–15)
AST SERPL W P-5'-P-CCNC: 26 U/L (ref 0–45)
BILIRUB SERPL-MCNC: 0.3 MG/DL
BUN SERPL-MCNC: 19.5 MG/DL (ref 8–23)
CALCIUM SERPL-MCNC: 9.7 MG/DL (ref 8.8–10.4)
CHLORIDE SERPL-SCNC: 102 MMOL/L (ref 98–107)
CREAT SERPL-MCNC: 0.69 MG/DL (ref 0.51–0.95)
EGFRCR SERPLBLD CKD-EPI 2021: >90 ML/MIN/1.73M2
GLUCOSE SERPL-MCNC: 91 MG/DL (ref 70–99)
HCO3 SERPL-SCNC: 25 MMOL/L (ref 22–29)
LEVETIRACETAM SERPL-MCNC: 26.2 ÂΜG/ML (ref 10–40)
POTASSIUM SERPL-SCNC: 4.1 MMOL/L (ref 3.4–5.3)
PROT SERPL-MCNC: 7.9 G/DL (ref 6.4–8.3)
SODIUM SERPL-SCNC: 141 MMOL/L (ref 135–145)

## 2025-03-22 LAB — LAMOTRIGINE SERPL-MCNC: 6.2 UG/ML

## 2025-04-23 ENCOUNTER — TELEPHONE (OUTPATIENT)
Dept: FAMILY MEDICINE | Facility: CLINIC | Age: 65
End: 2025-04-23
Payer: COMMERCIAL

## 2025-04-23 NOTE — TELEPHONE ENCOUNTER
Called [phone] (home) to clarify request.   Did they answer the phone: No, left a message on voicemail to return call to the [facility] at [phone], and to ask for any available triage nurse.  (RN did not leave specific details on voicemail for confidential reasons)      Saritha SALINASN RN  Triage Nurse  [facility]

## 2025-04-27 ENCOUNTER — HOSPITAL ENCOUNTER (EMERGENCY)
Facility: CLINIC | Age: 65
Discharge: HOME | End: 2025-04-27
Attending: PHYSICIAN ASSISTANT | Admitting: PHYSICIAN ASSISTANT
Payer: COMMERCIAL

## 2025-04-27 ENCOUNTER — APPOINTMENT (OUTPATIENT)
Dept: GENERAL RADIOLOGY | Facility: CLINIC | Age: 65
End: 2025-04-27
Attending: PHYSICIAN ASSISTANT
Payer: COMMERCIAL

## 2025-04-27 VITALS
SYSTOLIC BLOOD PRESSURE: 117 MMHG | HEART RATE: 86 BPM | DIASTOLIC BLOOD PRESSURE: 76 MMHG | OXYGEN SATURATION: 95 % | TEMPERATURE: 99.4 F

## 2025-04-27 DIAGNOSIS — R05.9 COUGH: ICD-10-CM

## 2025-04-27 DIAGNOSIS — J98.01 ACUTE BRONCHOSPASM: ICD-10-CM

## 2025-04-27 DIAGNOSIS — J01.00 ACUTE NON-RECURRENT MAXILLARY SINUSITIS: ICD-10-CM

## 2025-04-27 DIAGNOSIS — R50.9 FEBRILE ILLNESS: ICD-10-CM

## 2025-04-27 LAB
FLUAV RNA SPEC QL NAA+PROBE: NEGATIVE
FLUBV RNA RESP QL NAA+PROBE: NEGATIVE
RSV RNA SPEC NAA+PROBE: NEGATIVE
SARS-COV-2 RNA RESP QL NAA+PROBE: NEGATIVE

## 2025-04-27 PROCEDURE — 71046 X-RAY EXAM CHEST 2 VIEWS: CPT

## 2025-04-27 PROCEDURE — 99213 OFFICE O/P EST LOW 20 MIN: CPT | Performed by: PHYSICIAN ASSISTANT

## 2025-04-27 PROCEDURE — 87637 SARSCOV2&INF A&B&RSV AMP PRB: CPT | Performed by: PHYSICIAN ASSISTANT

## 2025-04-27 PROCEDURE — G0463 HOSPITAL OUTPT CLINIC VISIT: HCPCS | Mod: 25

## 2025-04-27 RX ORDER — PREDNISONE 20 MG/1
TABLET ORAL
Qty: 10 TABLET | Refills: 0 | Status: SHIPPED | OUTPATIENT
Start: 2025-04-27

## 2025-04-27 RX ORDER — ALBUTEROL SULFATE 90 UG/1
2 INHALANT RESPIRATORY (INHALATION) EVERY 6 HOURS PRN
Qty: 18 G | Refills: 0 | Status: SHIPPED | OUTPATIENT
Start: 2025-04-27

## 2025-04-27 ASSESSMENT — COLUMBIA-SUICIDE SEVERITY RATING SCALE - C-SSRS
2. HAVE YOU ACTUALLY HAD ANY THOUGHTS OF KILLING YOURSELF IN THE PAST MONTH?: NO
6. HAVE YOU EVER DONE ANYTHING, STARTED TO DO ANYTHING, OR PREPARED TO DO ANYTHING TO END YOUR LIFE?: NO
1. IN THE PAST MONTH, HAVE YOU WISHED YOU WERE DEAD OR WISHED YOU COULD GO TO SLEEP AND NOT WAKE UP?: NO

## 2025-04-27 ASSESSMENT — ENCOUNTER SYMPTOMS
FEVER: 1
CHILLS: 1
FATIGUE: 1
COUGH: 1
HEADACHES: 1
SORE THROAT: 1
ARTHRALGIAS: 1

## 2025-04-27 ASSESSMENT — ACTIVITIES OF DAILY LIVING (ADL)
ADLS_ACUITY_SCORE: 41
ADLS_ACUITY_SCORE: 41

## 2025-04-27 NOTE — ED PROVIDER NOTES
History     Chief Complaint   Patient presents with    Viral Syndrome     HPI  Magaly Renee is a 64 year old female who presents to Urgent Care with complaints of fever, chills, headache, body aches, nasal congestion, cough, and wheezing for the past 5-6 days.  Symptoms have been progressive since onset.  Patient states her significant other was ill with similar symptoms prior to her becoming ill.  Denies nausea, vomiting, diarrhea, abdominal pain, chest pain, or shortness of breath.  Nuys history of asthma or underlying lung disease.      Allergies:  Allergies   Allergen Reactions    Sulfa Antibiotics Other (See Comments)       Problem List:    Patient Active Problem List    Diagnosis Date Noted    Disorder of optic chiasm 2018     Priority: Medium    Migraine headache 2012     Priority: Medium     2012, Hx of migraine for > 6 years, used to follow with Dr Benja coelho, [facility], for years, he is in [place] ?, need meds refill of Fioricet with codeine.      Persistent Leukopenia 2008     Priority: Medium     ?Keppra vs other.  Check peripheral smear and consider hematology      Other generalized epilepsy, not intractable, without status epilepticus (H) 2006     Priority: Medium     Last Exam: 2006  Medications: Keppra, Carbatrol  Comments: Last seizure   Typically sees neurologist at [facility]  Problem list name updated by automated process. Provider to review          Past Medical History:    Past Medical History:   Diagnosis Date    Localization-related epilepsy (H)     Migraine     Migraine, unspecified, without mention of intractable migraine without mention of status migrainosus 2006    Other convulsions age of 12       Past Surgical History:    Past Surgical History:   Procedure Laterality Date    AS TOTAL HIP ARTHROPLASTY Right 10/27/2022    C/SECTION, LOW TRANSVERSE  2000    , Low Transverse    EXCISE MASS BACK  Left 8/14/2024    Procedure: EXCISION, MASS, BACK;  Surgeon: Brian Valerio MD;  Location: [facility] OR       Family History:    Family History   Problem Relation Age of Onset    Neurologic Disorder Mother         migraine    Bone Cancer Father     Diabetes No family hx of     C.A.D. No family hx of     Hypertension No family hx of     Cancer - colorectal No family hx of     Prostate Cancer No family hx of     Breast Cancer No family hx of     Cerebrovascular Disease No family hx of     Osteoporosis No family hx of        Social History:  Marital Status:   [2]  Social History     Tobacco Use    Smoking status: Never     Passive exposure: Never    Smokeless tobacco: Never   Vaping Use    Vaping status: Never Used   Substance Use Topics    Alcohol use: Yes     Comment: occassion    Drug use: No        Medications:    albuterol (PROAIR HFA/PROVENTIL HFA/VENTOLIN HFA) 108 (90 Base) MCG/ACT inhaler  amoxicillin-clavulanate (AUGMENTIN) 875-125 MG tablet  predniSONE (DELTASONE) 20 MG tablet  acetaminophen (TYLENOL) 325 MG tablet  lamoTRIgine (LAMICTAL) 100 MG tablet  levETIRAcetam (KEPPRA) 1000 MG tablet          Review of Systems   Constitutional:  Positive for chills, fatigue and fever.   HENT:  Positive for congestion and sore throat.    Respiratory:  Positive for cough.    Musculoskeletal:  Positive for arthralgias.   Neurological:  Positive for headaches.   All other systems reviewed and are negative.      Physical Exam   BP: 117/76  Pulse: 86  Temp: 99.4  F (37.4  C)  SpO2: 95 %      Physical Exam  Constitutional:       General: She is not in acute distress.     Appearance: Normal appearance. She is well-developed. She is not ill-appearing, toxic-appearing or diaphoretic.   HENT:      Head: Normocephalic and atraumatic.      Right Ear: Tympanic membrane, ear canal and external ear normal.      Left Ear: Tympanic membrane, ear canal and external ear normal.      Nose: Mucosal edema, congestion and rhinorrhea  present.      Mouth/Throat:      Lips: Pink.      Mouth: Mucous membranes are moist.      Pharynx: Uvula midline. Posterior oropharyngeal erythema present. No pharyngeal swelling, oropharyngeal exudate, uvula swelling or postnasal drip.      Tonsils: No tonsillar exudate or tonsillar abscesses.   Eyes:      Extraocular Movements: Extraocular movements intact.      Conjunctiva/sclera: Conjunctivae normal.      Pupils: Pupils are equal, round, and reactive to light.   Cardiovascular:      Rate and Rhythm: Normal rate and regular rhythm.      Heart sounds: Normal heart sounds.   Pulmonary:      Effort: Pulmonary effort is normal. No respiratory distress.      Breath sounds: No stridor. Wheezing present. No rhonchi or rales.      Comments: Diffuse expiratory wheezing throughout lung fields  Musculoskeletal:      Cervical back: Full passive range of motion without pain, normal range of motion and neck supple. No rigidity. Normal range of motion.   Lymphadenopathy:      Cervical: No cervical adenopathy.   Skin:     General: Skin is warm and dry.   Neurological:      Mental Status: She is alert and oriented to person, place, and time.   Psychiatric:         Behavior: Behavior is cooperative.         ED Course        Procedures    Results for orders placed or performed during the hospital encounter of 04/27/25 (from the past 24 hours)   Influenza A/B, RSV and SARS-CoV2 PCR (COVID-19) Nose    Specimen: Nose; Swab   Result Value Ref Range    Influenza A PCR Negative Negative    Influenza B PCR Negative Negative    RSV PCR Negative Negative    SARS CoV2 PCR Negative Negative    Narrative    Testing was performed using the Xpert Xpress CoV2/Flu/RSV Assay on the [employer] GeneXpert Instrument. This test should be ordered for the detection of SARS-CoV2, influenza, and RSV viruses in individuals with signs and symptoms of respiratory tract infection. This test is for in vitro diagnostic use under the [place] FDA for laboratories  "certified under CLIA to perform high or moderate complexity testing. This test has been [place] FDA cleared. A negative result does not rule out the presence of PCR inhibitors in the specimen or target RNA in concentration below the limit of detection for the assay. If only one viral target is positive but coinfection with multiple targets is suspected, the sample should be re-tested with another FDA cleared, approved, or authorized test, if coninfection would change clinical management. This test was validated by the [facility]. These laboratories are certified under the Clinical Laboratory Improvement Amendments of 1988 (CLIA-88) as qualified to perfom high complexity laboratory testing.   XR Chest 2 Views    Narrative    EXAM: XR CHEST 2 VIEWS  LOCATION: [facility]  DATE: 4/27/2025    INDICATION: cough, wheezing  COMPARISON: None.      Impression    IMPRESSION: Negative chest.       Medications - No data to display    Assessments & Plan (with Medical Decision Making)     Pt is a 64 year old female who presents to Urgent Care with complaints of fever, chills, headache, body aches, nasal congestion, cough, and wheezing for the past 5 days.  Patient states her significant other was ill with similar symptoms prior to her becoming ill.      Pt is afebrile on arrival.  Exam as above.  Influenza and COVID-19 testing were negative.  CXR was negative for pneumonia or acute pathology.  Discussed results with patient.  We discussed that symptoms may be still viral in origin.  Recommend starting Albuterol inhaler and Prednisone course for wheezing and chest congestion.  Patient and her significant other are worried because this is similar in presentation to when she \"spiraled into sepsis\" in the past.  Given patient's progressive symptoms including fevers and worsening sinus symptoms and associated productive cough and associated wheezing, will start on Augmentin as well.  " Encouraged additional symptomatic treatments at home.  Return precautions were reviewed.  Hand-outs were provided.    Patient was instructed to follow-up with PCP for continued care and management.  She is to return to the ED for persistent and/or worsening symptoms.  Patient expressed understanding of the diagnosis and plan and was discharged home in good condition.    I have reviewed the nursing notes.    I have reviewed the findings, diagnosis, plan and need for follow up with the patient.    New Prescriptions    ALBUTEROL (PROAIR HFA/PROVENTIL HFA/VENTOLIN HFA) 108 (90 BASE) MCG/ACT INHALER    Inhale 2 puffs into the lungs every 6 hours as needed for shortness of breath, wheezing or cough.    AMOXICILLIN-CLAVULANATE (AUGMENTIN) 875-125 MG TABLET    Take 1 tablet by mouth 2 times daily for 10 days.    PREDNISONE (DELTASONE) 20 MG TABLET    Take two tablets (= 40mg) each day for 5 (five) days       Final diagnoses:   Acute non-recurrent maxillary sinusitis   Cough   Febrile illness   Acute bronchospasm       4/27/2025   [facility] EMERGENCY DEPT      Disclaimer:  This note consists of symbols derived from keyboarding, dictation and/or voice recognition software.  As a result, there may be errors in the script that have gone undetected.  Please consider this when interpreting information found in this chart.     Patti Curtis PA-C  04/27/25 [id number]

## 2025-07-03 ENCOUNTER — OFFICE VISIT (OUTPATIENT)
Dept: FAMILY MEDICINE | Facility: CLINIC | Age: 65
End: 2025-07-03
Payer: COMMERCIAL

## 2025-07-03 VITALS
SYSTOLIC BLOOD PRESSURE: 90 MMHG | TEMPERATURE: 98.4 F | RESPIRATION RATE: 12 BRPM | HEART RATE: 70 BPM | WEIGHT: 159 LBS | BODY MASS INDEX: 24.96 KG/M2 | OXYGEN SATURATION: 97 % | HEIGHT: 67 IN | DIASTOLIC BLOOD PRESSURE: 62 MMHG

## 2025-07-03 DIAGNOSIS — N90.7 VULVAR CYST: Primary | ICD-10-CM

## 2025-07-03 PROCEDURE — 3078F DIAST BP <80 MM HG: CPT | Performed by: FAMILY MEDICINE

## 2025-07-03 PROCEDURE — 3074F SYST BP LT 130 MM HG: CPT | Performed by: FAMILY MEDICINE

## 2025-07-03 PROCEDURE — 99213 OFFICE O/P EST LOW 20 MIN: CPT | Mod: 25 | Performed by: FAMILY MEDICINE

## 2025-07-03 PROCEDURE — 90715 TDAP VACCINE 7 YRS/> IM: CPT | Performed by: FAMILY MEDICINE

## 2025-07-03 PROCEDURE — G2211 COMPLEX E/M VISIT ADD ON: HCPCS | Performed by: FAMILY MEDICINE

## 2025-07-03 PROCEDURE — 90471 IMMUNIZATION ADMIN: CPT | Performed by: FAMILY MEDICINE

## 2025-07-03 RX ORDER — AMOXICILLIN 500 MG/1
CAPSULE ORAL PRN
COMMUNITY
Start: 2025-06-19

## 2025-07-03 NOTE — PROGRESS NOTES
"  {PROVIDER CHARTING PREFERENCE:098346}    Subjective   Karen is a 64 year old, presenting for the following health issues:  Hernia        7/3/2025     1:22 PM   Additional Questions   Roomed by Mone MARAVILLA CMA     History of Present Illness       Reason for visit:  Hernia   She is taking medications regularly.      She is here for ? Hernia. She has been doing a lot of yard work this is the first time this has happened   No change in bm or urine   She noted it yeasterday in the bath no pain               Objective    BP 90/62 (BP Location: Right arm, Patient Position: Sitting, Cuff Size: Adult Regular)   Pulse 70   Temp 98.4  F (36.9  C) (Tympanic)   Resp 12   Ht 1.689 m (5' 6.5\")   Wt 72.1 kg (159 lb)   LMP 07/06/2016 (Exact Date)   SpO2 97%   BMI 25.28 kg/m    Body mass index is 25.28 kg/m .  Physical Exam   {Exam List (Optional):696541}    {Diagnostic Test Results (Optional):719754}        Signed Electronically by: Kaya Scott MD  {Email feedback regarding this note to [email or web address]   :[id number]}  "

## 2025-07-12 ENCOUNTER — HEALTH MAINTENANCE LETTER (OUTPATIENT)
Age: 65
End: 2025-07-12

## (undated) DEVICE — PACK LAPAROTOMY CUSTOM LAKES

## (undated) DEVICE — SYR BULB IRRIG DOVER 60 ML LATEX FREE 67000

## (undated) DEVICE — ESU PENCIL SMOKE EVAC W/ROCKER SWITCH 0703-047-000

## (undated) DEVICE — SU DERMABOND ADVANCED .7ML DNX12

## (undated) DEVICE — SU MONOCRYL 4-0 PS-2 18" UND Y496G

## (undated) DEVICE — GLOVE BIOGEL PI MICRO INDICATOR UNDERGLOVE SZ 7.5 48975

## (undated) DEVICE — PREP CHLORAPREP 26ML TINTED ORANGE  260815

## (undated) DEVICE — SU VICRYL 3-0 SH 27" UND J416H

## (undated) DEVICE — SOL NACL 0.9% IRRIG 1000ML BOTTLE 07138-09

## (undated) DEVICE — STOCKING SLEEVE COMPRESSION CALF LG

## (undated) DEVICE — SOL WATER IRRIG 1000ML BOTTLE 07139-09

## (undated) DEVICE — GLOVE BIOGEL PI MICRO SZ 7.0 48570

## (undated) DEVICE — BLADE KNIFE SURG 15 371115

## (undated) DEVICE — GOWN XLG DISP 9545

## (undated) RX ORDER — ONDANSETRON 2 MG/ML
INJECTION INTRAMUSCULAR; INTRAVENOUS
Status: DISPENSED
Start: 2024-08-14

## (undated) RX ORDER — BUPIVACAINE HYDROCHLORIDE 2.5 MG/ML
INJECTION, SOLUTION EPIDURAL; INFILTRATION; INTRACAUDAL
Status: DISPENSED
Start: 2024-08-14

## (undated) RX ORDER — CEFAZOLIN SODIUM/WATER 2 G/20 ML
SYRINGE (ML) INTRAVENOUS
Status: DISPENSED
Start: 2024-08-14

## (undated) RX ORDER — ACETAMINOPHEN 325 MG/1
TABLET ORAL
Status: DISPENSED
Start: 2024-08-14

## (undated) RX ORDER — KETOROLAC TROMETHAMINE 30 MG/ML
INJECTION, SOLUTION INTRAMUSCULAR; INTRAVENOUS
Status: DISPENSED
Start: 2024-08-14

## (undated) RX ORDER — DEXAMETHASONE SODIUM PHOSPHATE 4 MG/ML
INJECTION, SOLUTION INTRA-ARTICULAR; INTRALESIONAL; INTRAMUSCULAR; INTRAVENOUS; SOFT TISSUE
Status: DISPENSED
Start: 2024-08-14

## (undated) RX ORDER — GABAPENTIN 300 MG/1
CAPSULE ORAL
Status: DISPENSED
Start: 2024-08-14

## (undated) RX ORDER — LIDOCAINE HYDROCHLORIDE AND EPINEPHRINE 10; 10 MG/ML; UG/ML
INJECTION, SOLUTION INFILTRATION; PERINEURAL
Status: DISPENSED
Start: 2024-08-14